# Patient Record
Sex: FEMALE | Race: WHITE | Employment: PART TIME | URBAN - METROPOLITAN AREA
[De-identification: names, ages, dates, MRNs, and addresses within clinical notes are randomized per-mention and may not be internally consistent; named-entity substitution may affect disease eponyms.]

---

## 2018-10-31 ENCOUNTER — OFFICE VISIT (OUTPATIENT)
Dept: NEUROLOGY | Age: 23
End: 2018-10-31
Payer: COMMERCIAL

## 2018-10-31 VITALS
HEIGHT: 68 IN | RESPIRATION RATE: 14 BRPM | OXYGEN SATURATION: 99 % | WEIGHT: 182 LBS | HEART RATE: 62 BPM | BODY MASS INDEX: 27.58 KG/M2 | DIASTOLIC BLOOD PRESSURE: 90 MMHG | SYSTOLIC BLOOD PRESSURE: 130 MMHG | TEMPERATURE: 98.1 F

## 2018-10-31 PROBLEM — G43.909 MIGRAINE HEADACHE: Chronic | Status: ACTIVE | Noted: 2018-10-31

## 2018-10-31 PROCEDURE — 99204 OFFICE O/P NEW MOD 45 MIN: CPT | Performed by: PSYCHIATRY & NEUROLOGY

## 2018-10-31 RX ORDER — AZELASTINE HYDROCHLORIDE 137 UG/1
SPRAY, METERED NASAL
Refills: 2 | COMMUNITY
Start: 2018-10-22 | End: 2021-04-02

## 2018-10-31 RX ORDER — NORETHINDRONE AND ETHINYL ESTRADIOL 0.8-25(24)
1 KIT ORAL NIGHTLY
Refills: 3 | COMMUNITY
Start: 2018-09-02

## 2018-10-31 RX ORDER — EPINEPHRINE 0.3 MG/.3ML
INJECTION SUBCUTANEOUS
Refills: 2 | COMMUNITY
Start: 2018-10-18

## 2018-10-31 RX ORDER — CLONAZEPAM 0.5 MG/1
0.5 TABLET ORAL PRN
Refills: 0 | COMMUNITY
Start: 2018-08-10 | End: 2021-04-02

## 2018-10-31 RX ORDER — SERTRALINE HYDROCHLORIDE 25 MG/1
TABLET, FILM COATED ORAL
Refills: 0 | COMMUNITY
Start: 2018-10-06 | End: 2021-04-02

## 2018-10-31 RX ORDER — FLUTICASONE PROPIONATE 50 MCG
SPRAY, SUSPENSION (ML) NASAL
Refills: 2 | COMMUNITY
Start: 2018-10-18 | End: 2021-03-23

## 2018-10-31 RX ORDER — RIZATRIPTAN BENZOATE 5 MG/1
TABLET, ORALLY DISINTEGRATING ORAL
Refills: 3 | COMMUNITY
Start: 2018-09-17 | End: 2021-04-02

## 2018-10-31 RX ORDER — LEVOCETIRIZINE DIHYDROCHLORIDE 5 MG/1
TABLET, FILM COATED ORAL
Refills: 2 | COMMUNITY
Start: 2018-10-18 | End: 2021-04-02

## 2018-10-31 RX ORDER — PROPRANOLOL HYDROCHLORIDE 20 MG/1
20 TABLET ORAL 3 TIMES DAILY
Qty: 90 TABLET | Refills: 5 | Status: SHIPPED | OUTPATIENT
Start: 2018-10-31 | End: 2021-04-02

## 2018-10-31 RX ORDER — AZELASTINE HYDROCHLORIDE 0.5 MG/ML
SOLUTION/ DROPS OPHTHALMIC
Refills: 0 | COMMUNITY
Start: 2018-10-18 | End: 2021-04-02

## 2018-10-31 RX ORDER — OMEGA-3-ACID ETHYL ESTERS 1 G/1
CAPSULE, LIQUID FILLED ORAL
Refills: 5 | COMMUNITY
Start: 2018-10-16 | End: 2021-04-02

## 2018-10-31 ASSESSMENT — ENCOUNTER SYMPTOMS
RESPIRATORY NEGATIVE: 1
ALLERGIC/IMMUNOLOGIC NEGATIVE: 1
EYES NEGATIVE: 1
GASTROINTESTINAL NEGATIVE: 1

## 2021-03-05 ENCOUNTER — HOSPITAL ENCOUNTER (EMERGENCY)
Age: 26
Discharge: HOME | End: 2021-03-06
Attending: EMERGENCY MEDICINE
Payer: COMMERCIAL

## 2021-03-05 VITALS
BODY MASS INDEX: 25.76 KG/M2 | RESPIRATION RATE: 18 BRPM | WEIGHT: 170 LBS | TEMPERATURE: 97.3 F | HEIGHT: 68 IN | HEART RATE: 85 BPM | DIASTOLIC BLOOD PRESSURE: 88 MMHG | OXYGEN SATURATION: 98 % | SYSTOLIC BLOOD PRESSURE: 144 MMHG

## 2021-03-05 DIAGNOSIS — S09.92XA NOSE INJURY, INITIAL ENCOUNTER: Primary | ICD-10-CM

## 2021-03-05 PROCEDURE — 99283 EMERGENCY DEPT VISIT LOW MDM: CPT

## 2021-03-07 NOTE — ED PROVIDER NOTES
HPI:  3/7/21,   Time: 2:46 PM GINA Vallecillo is a 32 y.o. female presenting to the ED for  Head injury. Patient states this evening she was playing with her dog one her dogs had actually hit patient's nose. Patient admits to some mild pain and swelling to right side of nose. Denies headache, no LOC, no neck pain, no weakness or paresthesias. No other complaints. ROS:   GEN: no fevers, no chills, no fatique  HENT: See HPI  EYES: No changes in vision, no pain  CARDIO: No chest pain, no palpitations  PULM: No trouble breathing, no wheezing  ABD: No pain, no nausea, no vomiting  MSK: No pain, no trauma, no deformities  SKIN: No rashes, no abrasions, no lacerations  NEURO: No changes in mentation, no headache, no weakness     --------------------------------------------- PAST HISTORY ---------------------------------------------  Past Medical History:  has a past medical history of Migraine headache. Past Surgical History:  has no past surgical history on file. Social History:  reports that she has never smoked. She has never used smokeless tobacco. She reports that she does not use drugs. Family History: family history is not on file. The patients home medications have been reviewed. Allergies: Patient has no known allergies. -------------------------------------------------- RESULTS -------------------------------------------------  All laboratory and radiology results have been personally reviewed by myself   LABS:  No results found for this visit on 03/05/21. RADIOLOGY:  Interpreted by Radiologist.  No orders to display       ------------------------- NURSING NOTES AND VITALS REVIEWED ---------------------------   The nursing notes within the ED encounter and vital signs as below have been reviewed.    BP (!) 144/88   Pulse 85   Temp 97.3 °F (36.3 °C)   Resp 18   Ht 5' 8\" (1.727 m)   Wt 170 lb (77.1 kg)   SpO2 98%   BMI 25.85 kg/m²   Oxygen Saturation Interpretation: Normal    ---------------------------------------------------PHYSICAL EXAM--------------------------------------    GEN: No acute distress, well-appearing, well nourished   HENT: Normocephalic, atraumatic, oral mucosa moist,  Mild tenderness to palpation over nasal bridge, no septal hematoma, mild edema noted right side of nose, no other facial tenderness. EYES: No scleral injection, no scleral icterus, PERRL, EOMI   PULM: Lungs clear to ascultation bilaterally, no wheezes, no crackles  CARDIO: Regular rate, regular rhythm, normal S1/S2  ABD: Soft, non-tender, no rigidity  MSK: No deformities, no edema, palpable pulses all extremities   SKIN: No rashes, no lacerations, no abrasions   NEURO:  AAOx3, Cranial nerves 2 through 12 grossly intact, muscle strength +5/5 equal bilateral upper and lower extremities, sensation intact in equal extremities, no pronator drift, no ataxia        ------------------------------ ED COURSE/MEDICAL DECISION MAKING----------------------  Medications - No data to display      ED Course and Medical Decision Making:    Patient presents for nose pain after mild trauma. Non-toxic and well appearing, no head trauma, no LOC, neurologically intact. Patient offered CTL phase to evaluate for possible fracture nasal septum, patient declined, states she will wait till swelling goes down and then if needed follow-up with ENT. No other indication for imaging at this time. Contact information for ENT was provided to patient. Discharged home with appropriate recommendations and return precautions. Patient states understanding agrees with plan.     --------------------------------- ADDITIONAL PROVIDER NOTES ---------------------------------    This patient's ED course included: a personal history and physicial eaxmination    This patient has remained hemodynamically stable during their ED course. Counseling:   The emergency provider has spoken with the patient and discussed todays results, in addition to providing specific details for the plan of care and counseling regarding the diagnosis and prognosis. Questions are answered at this time and they are agreeable with the plan.      --------------------------------- IMPRESSION AND DISPOSITION ---------------------------------    IMPRESSION  1.  Nose injury, initial encounter        DISPOSITION  Disposition: Discharge to home  Patient condition is good        Harjinder Calderon DO  03/07/21 1459

## 2021-03-23 ENCOUNTER — OFFICE VISIT (OUTPATIENT)
Dept: ENT CLINIC | Age: 26
End: 2021-03-23
Payer: COMMERCIAL

## 2021-03-23 VITALS — BODY MASS INDEX: 26.37 KG/M2 | HEIGHT: 68 IN | RESPIRATION RATE: 20 BRPM | WEIGHT: 174 LBS

## 2021-03-23 DIAGNOSIS — J34.3 HYPERTROPHY OF BOTH INFERIOR NASAL TURBINATES: ICD-10-CM

## 2021-03-23 DIAGNOSIS — J34.89 NASAL OBSTRUCTION: ICD-10-CM

## 2021-03-23 DIAGNOSIS — J34.2 DNS (DEVIATED NASAL SEPTUM): ICD-10-CM

## 2021-03-23 DIAGNOSIS — T78.40XS ALLERGY, SEQUELA: ICD-10-CM

## 2021-03-23 DIAGNOSIS — S02.2XXA CLOSED FRACTURE OF NASAL BONE, INITIAL ENCOUNTER: ICD-10-CM

## 2021-03-23 DIAGNOSIS — R09.81 NASAL CONGESTION: ICD-10-CM

## 2021-03-23 DIAGNOSIS — S09.93XA FACIAL INJURY, INITIAL ENCOUNTER: Primary | ICD-10-CM

## 2021-03-23 PROCEDURE — G8427 DOCREV CUR MEDS BY ELIG CLIN: HCPCS | Performed by: OTOLARYNGOLOGY

## 2021-03-23 PROCEDURE — 1036F TOBACCO NON-USER: CPT | Performed by: OTOLARYNGOLOGY

## 2021-03-23 PROCEDURE — 99204 OFFICE O/P NEW MOD 45 MIN: CPT | Performed by: OTOLARYNGOLOGY

## 2021-03-23 PROCEDURE — G8484 FLU IMMUNIZE NO ADMIN: HCPCS | Performed by: OTOLARYNGOLOGY

## 2021-03-23 PROCEDURE — G8419 CALC BMI OUT NRM PARAM NOF/U: HCPCS | Performed by: OTOLARYNGOLOGY

## 2021-03-23 RX ORDER — FLUTICASONE PROPIONATE 50 MCG
1 SPRAY, SUSPENSION (ML) NASAL 2 TIMES DAILY
Qty: 2 BOTTLE | Refills: 1 | Status: SHIPPED
Start: 2021-03-23 | End: 2021-04-02

## 2021-03-23 NOTE — PROGRESS NOTES
Dear Dr Espinoza primary care provider on file. No ref. provider found     We had the pleasure of seeing Ruth Ann, 1995 here    on 3/23/2021  Please see below for review of care and plans. Chief complaint- No diagnosis found. History of Present Illness- Was playing with dog and was hit in the face when she went to get the ball by dog's skull. Went to ER 20 days ago with no imaging and then here for eval Left side of nose feels blocked since event and also feels nose is off to the left side as the dog hit her right side. Has seasonal allergies that are worse in spring and uses meds but not fully controlled. + allergy testing with multiple antigens    Review of Systems- No drainage, discharge, or headache. Complete 10 system ROS completed and negative except as noted above. Physical Examination-   Vital Signs-Resp 20   Ht 5' 8\" (1.727 m)   Wt 174 lb (78.9 kg)   BMI 26.46 kg/m²     Ears- Tympanic membranes clear bilaterally. No middle ear effusion. No pre or post auricular tenderness. Nose- Nasal mucosa clear and dry. +  septal deviation and b inferior turbinate hypertrophy. + left deviation of external bony nasal vault with stepoff palpated. deviaiton of septum is related to bony vault deviation. Oral Cavity/Oropharynx- Floor of mouth and tongue are soft and nontender. No posterior pharyngeal erythema. + gag reflex  Neck- Soft and nontender. No masses, lesions, lymphadenopathy, or thyroid nodules appreciated. Cranial Nerve- Cranial nerves II to XII intact. Extraocular muscles intact. No gross motor visual deficits. No spontaneous nystagmus. Face- No facial skin tenderness to palpation. Heart- No cyanosis, regular  Lungs- No stridor, no intercostal accessory muscle use  General- The patient is in no acute distress. A&O x3    Medical Decision Making and Treatment Plan. Plan at this time for fracture management of nasal fracture was to   1 Review scans with pt- none  2.  D/w pt

## 2021-03-23 NOTE — PATIENT INSTRUCTIONS
[facility] Sinus Rinse     Step 1.- Fill the bottle with distilled water    Step 2.- Mix the solution  Put saline/salt mixture into the bottle, tighten the top of the bottle and shake gently to dissolve mixture. Step 3.- Positioning  Standing in front of the sink bend forward and tilt head down (about 45 degrees) so that you are looking down into the sink. Use this position unless instructed otherwise. Gently place the spout of the saline bottle against one nostril    Step 4.- Rinse  Breathe through your mouth and gently squeeze the bottle. The solution will start to drain from OPPOSITE nasal passage (or some, it may also drain into the mouth)  Use 2oz/60ml/half the solution in the bottle on each side  You may need to blow your nose afterwards to help drain any residual solution. Step 5. Repeat  Repeat step 3 and four with the other nostril! Videos demonstrating saline irrigation techniques    Use twice daily   SURGERY DATE:___________________________    Nothing to eat or drink after midnight the night before surgery unless otherwise instructed by the hospital.    DO NOT TAKE ANY ASPIRIN PRODUCTS 7 days prior to surgery. Tylenol only. No Advil, Motrin, Aleve, or Ibuprofen. Any illegal drugs in your system (including Marijuana even if legally prescribed) will result in your surgery being cancelled. Please be sure to check with our office or the hospital on time frame for the drugs to be out of your system. SHOULD YOUR INSURANCE CHANGE AT ANY TIME YOU MUST CONTACT OUR OFFICE. FAILURE TO DO SO MAY RESULT IN YOUR SURGERY BEING RESCHEDULED OR YOU MAY BE CHARGED AS SELF-PAY. If you need FMLA or Short Term Disability paperwork completed for your surgery, please complete your portion, ensure your name and date of birth are on them and fax them to [phone] asap. Paperwork can take up to 2 weeks to be completed.     Per current hospital protocols, you will be contacted within 1 week of your surgery date with instructions to complete COVID-19 testing. Surgery will be cancelled if this is not completed. If you need medical clearance, you are responsible to contact your physician(s) to schedule the appointment. If clearance is not completed within 30 days of your surgery it may be cancelled. Our office will fax the appropriate forms that need to be completed to your physician(s). The location of your surgery will call you the day prior to your surgery date to let you know what time you have to be there and any other details. ·       [address] [facility], [address] [facility] will call you a couple days prior to surgery and give you further instructions, if you have any questions, you can reach them at [phone]    ·       [address] [facility] will call you a couple days prior to surgery and give you further instructions, if you have any questions, you can reach them at [phone]    ·       [facility], [address] [facility] will call you a couple days prior to surgery and give you further instructions, if you have any questions, you can reach them at [phone] extension [phone]    ·      [address] [place] [address].  Duran Carroll will call you a couple days prior to surgery and give you further instructions, if you have any questions, you can reach them at [phone] [address] [facility] OFFICE IF THERE SHOULD BE ANY CHANGES TO MY INFORMATION    Signature: __________________________________ Date: ____/____/____        Ketotifen eye drops       Can be purchased over the counter

## 2021-03-30 NOTE — PROGRESS NOTES
Patient agreed to COVID test on 4/2 at the  [facility]   located at  [address] [place] [address]. between the hours of 6 am- 2:30 pm, instructed to bring ID. Patient instructed to self isolate until day of surgery.

## 2021-04-01 ENCOUNTER — HOSPITAL ENCOUNTER (OUTPATIENT)
Age: 26
Discharge: HOME | End: 2021-04-03
Payer: COMMERCIAL

## 2021-04-01 DIAGNOSIS — U07.1 COVID-19: ICD-10-CM

## 2021-04-01 PROCEDURE — U0003 INFECTIOUS AGENT DETECTION BY NUCLEIC ACID (DNA OR RNA); SEVERE ACUTE RESPIRATORY SYNDROME CORONAVIRUS 2 (SARS-COV-2) (CORONAVIRUS DISEASE [COVID-19]), AMPLIFIED PROBE TECHNIQUE, MAKING USE OF HIGH THROUGHPUT TECHNOLOGIES AS DESCRIBED BY CMS-2020-01-R: HCPCS

## 2021-04-02 NOTE — PROGRESS NOTES
[address] PRE-ADMISSION TESTING GENERAL INSTRUCTIONS- [facility]-phone number:[phone]    GENERAL INSTRUCTIONS  [x] Antibacterial Soap shower Night before and/or AM of Surgery    [x] Nothing by mouth after midnight, including gum, candy, mints, or water. [x] You may brush your teeth, gargle, but do NOT swallow water. [x]No smoking, chewing tobacco, illegal drugs, or alcohol within 24 hours of your surgery. [x] Jewelry, valuables or body piercing's should not be brought to the hospital. All body and/or tongue piercing's must be removed prior to arriving to hospital.  ALL hair pins must be removed. [x] Do not wear makeup, lotions, powders, deodorant. Nail polish as directed by the nurse. [x] Arrange transportation with a responsible adult  to and from the hospital. If you do not have a responsible adult  to transport you, you will need to make arrangements with a medical transportation company (i.e. [employer]. A Uber/taxi/bus is not appropriate unless you are accompanied by a responsible adult ). Arrange for someone to be with you for the remainder of the day and for 24 hours after your procedure due to having had anesthesia. Who will be your  for transportation? Nahumelli Long, mom  Who will be staying with you for 24 hrs after your procedure? Nahum Long, mom and grandparentls  [x] Bring insurance card and photo ID. [x] Bring urine specimen day of surgery. Any small container is acceptable. PARKING INSTRUCTIONS:   [x] Arrival Time: 9:30 am, you and your  will need to wear a mask. · [x] Parking lot '\"I\"  is located on [facility] (the corner of [facility]). To enter, press the button and the gate will lift. A free token will be provided to exit the lot. One car per patient is allowed to park in this lot. All other cars are to park on [address] [place] [address] either in the parking garage or the handicap lot.       Walk up the

## 2021-04-03 LAB
SARS-COV-2: NOT DETECTED
SOURCE: NORMAL

## 2021-04-06 NOTE — H&P
We had the pleasure of seeing Ruth Ann, 1995 here    on 3/23/2021  Please see below for review of care and plans.      Chief complaint- No diagnosis found.        History of Present Illness- Was playing with dog and was hit in the face when she went to get the ball by dog's skull. Went to ER 20 days ago with no imaging and then here for eval Left side of nose feels blocked since event and also feels nose is off to the left side as the dog hit her right side. Has seasonal allergies that are worse in spring and uses meds but not fully controlled. + allergy testing with multiple antigens    Review of Systems- No drainage, discharge, or headache. Complete 10 system ROS completed and negative except as noted above. Physical Examination-   Vital Signs-Resp 20   Ht 5' 8\" (1.727 m)   Wt 174 lb (78.9 kg)   BMI 26.46 kg/m²     Ears- Tympanic membranes clear bilaterally.  No middle ear effusion.  No pre or post auricular tenderness. Nose- Nasal mucosa clear and dry.  +  septal deviation and b inferior turbinate hypertrophy. + left deviation of external bony nasal vault with stepoff palpated. deviaiton of septum is related to bony vault deviation. Oral Cavity/Oropharynx- Floor of mouth and tongue are soft and nontender.  No posterior pharyngeal erythema. + gag reflex  Neck- Soft and nontender.  No masses, lesions, lymphadenopathy, or thyroid nodules appreciated. Cranial Nerve- Cranial nerves II to XII intact.  Extraocular muscles intact.  No gross motor visual deficits.  No spontaneous nystagmus.    Face- No facial skin tenderness to palpation. Heart- No cyanosis, regular  Lungs- No stridor, no intercostal accessory muscle use  General- The patient is in no acute distress. A&O x3    Medical Decision Making and Treatment Plan. Plan at this time for fracture management of nasal fracture was to   1 Review scans with pt- none  2. D/w pt options of treatment- conservative vs surgical   3.  Recommend surgical intervention as + displaced fracture of nasal bone to the left with concomitant deviation of the septum   4. R/B/A of surgery discussed with pt. Pt understood, consent signed, and would like to proceed to surgery. No personal or family history of bleeding or adverse reaction to anesthesia. 5. flonase for allergies  6. Nasal irrigation  7. Use ketotifen eye drops for break throughallergy eye sxs.       Thank you for the opportunity to take part in the care of this very pleasant patient, Edward Farr  Sincerely,             Evgeny Barbosa. Mari Palacios M.D., Ph.D., [place] [address]  Department of Otolaryngology-Head and Neck Surgery                  Office Visit on 3/23/2021        Revision History        Detailed Report        Note shared with patient  Progress Notes Info    Author Note Status Last Update User   Lincoln Oleary MD Signed Lincoln Oleary MD   Last Update Date/Time: 3/23/2021  5:58 PM   Chart Review Routing History    No routing history on file. H&P reviewed, no changes. No issues. Questions and concerns were answered to the patient's satisfaction.  Will proceed with procedure    Will discuss with attending     Electronically signed by Keara Hartman DO on 4/6/21 at 6:06 PM EDT

## 2021-04-07 ENCOUNTER — HOSPITAL ENCOUNTER (OUTPATIENT)
Age: 26
Setting detail: OUTPATIENT SURGERY
Discharge: HOME | End: 2021-04-07
Attending: OTOLARYNGOLOGY | Admitting: OTOLARYNGOLOGY
Payer: COMMERCIAL

## 2021-04-07 ENCOUNTER — ANESTHESIA (OUTPATIENT)
Dept: OPERATING ROOM | Age: 26
End: 2021-04-07
Payer: COMMERCIAL

## 2021-04-07 ENCOUNTER — ANESTHESIA EVENT (OUTPATIENT)
Dept: OPERATING ROOM | Age: 26
End: 2021-04-07
Payer: COMMERCIAL

## 2021-04-07 VITALS
WEIGHT: 174 LBS | OXYGEN SATURATION: 96 % | DIASTOLIC BLOOD PRESSURE: 68 MMHG | HEIGHT: 68 IN | TEMPERATURE: 98.1 F | BODY MASS INDEX: 26.37 KG/M2 | RESPIRATION RATE: 17 BRPM | HEART RATE: 83 BPM | SYSTOLIC BLOOD PRESSURE: 137 MMHG

## 2021-04-07 VITALS — SYSTOLIC BLOOD PRESSURE: 132 MMHG | OXYGEN SATURATION: 96 % | TEMPERATURE: 97.2 F | DIASTOLIC BLOOD PRESSURE: 86 MMHG

## 2021-04-07 DIAGNOSIS — U07.1 COVID-19: Primary | ICD-10-CM

## 2021-04-07 DIAGNOSIS — Z01.818 PRE-OP TESTING: ICD-10-CM

## 2021-04-07 DIAGNOSIS — G89.18 POST-OP PAIN: ICD-10-CM

## 2021-04-07 LAB
HCG, URINE, POC: NEGATIVE
Lab: NORMAL
NEGATIVE QC PASS/FAIL: NORMAL
POSITIVE QC PASS/FAIL: NORMAL

## 2021-04-07 PROCEDURE — 6370000000 HC RX 637 (ALT 250 FOR IP): Performed by: OTOLARYNGOLOGY

## 2021-04-07 PROCEDURE — 2720000010 HC SURG SUPPLY STERILE: Performed by: OTOLARYNGOLOGY

## 2021-04-07 PROCEDURE — 2500000003 HC RX 250 WO HCPCS

## 2021-04-07 PROCEDURE — 21315 CLSD TX NSL FX MNPJ WO STBLJ: CPT | Performed by: OTOLARYNGOLOGY

## 2021-04-07 PROCEDURE — 3600000003 HC SURGERY LEVEL 3 BASE: Performed by: OTOLARYNGOLOGY

## 2021-04-07 PROCEDURE — 6360000002 HC RX W HCPCS: Performed by: ANESTHESIOLOGY

## 2021-04-07 PROCEDURE — 2709999900 HC NON-CHARGEABLE SUPPLY: Performed by: OTOLARYNGOLOGY

## 2021-04-07 PROCEDURE — 6370000000 HC RX 637 (ALT 250 FOR IP): Performed by: STUDENT IN AN ORGANIZED HEALTH CARE EDUCATION/TRAINING PROGRAM

## 2021-04-07 PROCEDURE — 7100000000 HC PACU RECOVERY - FIRST 15 MIN: Performed by: OTOLARYNGOLOGY

## 2021-04-07 PROCEDURE — 7100000011 HC PHASE II RECOVERY - ADDTL 15 MIN: Performed by: OTOLARYNGOLOGY

## 2021-04-07 PROCEDURE — 7100000001 HC PACU RECOVERY - ADDTL 15 MIN: Performed by: OTOLARYNGOLOGY

## 2021-04-07 PROCEDURE — 3600000013 HC SURGERY LEVEL 3 ADDTL 15MIN: Performed by: OTOLARYNGOLOGY

## 2021-04-07 PROCEDURE — 2580000003 HC RX 258

## 2021-04-07 PROCEDURE — 3700000000 HC ANESTHESIA ATTENDED CARE: Performed by: OTOLARYNGOLOGY

## 2021-04-07 PROCEDURE — 6360000002 HC RX W HCPCS

## 2021-04-07 PROCEDURE — 2500000003 HC RX 250 WO HCPCS: Performed by: OTOLARYNGOLOGY

## 2021-04-07 PROCEDURE — 7100000010 HC PHASE II RECOVERY - FIRST 15 MIN: Performed by: OTOLARYNGOLOGY

## 2021-04-07 PROCEDURE — 30802 ABLATE INF TURBINATE SUBMUC: CPT | Performed by: OTOLARYNGOLOGY

## 2021-04-07 PROCEDURE — 3700000001 HC ADD 15 MINUTES (ANESTHESIA): Performed by: OTOLARYNGOLOGY

## 2021-04-07 PROCEDURE — 21337 CLOSED TX SEPTAL&NOSE FX: CPT | Performed by: OTOLARYNGOLOGY

## 2021-04-07 RX ORDER — MEPERIDINE HYDROCHLORIDE 25 MG/ML
12.5 INJECTION INTRAMUSCULAR; INTRAVENOUS; SUBCUTANEOUS EVERY 5 MIN PRN
Status: DISCONTINUED | OUTPATIENT
Start: 2021-04-07 | End: 2021-04-07 | Stop reason: HOSPADM

## 2021-04-07 RX ORDER — LIDOCAINE HYDROCHLORIDE AND EPINEPHRINE 10; 10 MG/ML; UG/ML
INJECTION, SOLUTION INFILTRATION; PERINEURAL PRN
Status: DISCONTINUED | OUTPATIENT
Start: 2021-04-07 | End: 2021-04-07 | Stop reason: ALTCHOICE

## 2021-04-07 RX ORDER — LABETALOL HYDROCHLORIDE 5 MG/ML
5 INJECTION, SOLUTION INTRAVENOUS EVERY 10 MIN PRN
Status: DISCONTINUED | OUTPATIENT
Start: 2021-04-07 | End: 2021-04-07 | Stop reason: HOSPADM

## 2021-04-07 RX ORDER — SODIUM CHLORIDE 0.9 % (FLUSH) 0.9 %
10 SYRINGE (ML) INJECTION PRN
Status: DISCONTINUED | OUTPATIENT
Start: 2021-04-07 | End: 2021-04-07 | Stop reason: HOSPADM

## 2021-04-07 RX ORDER — DEXAMETHASONE SODIUM PHOSPHATE 10 MG/ML
INJECTION, SOLUTION INTRAMUSCULAR; INTRAVENOUS PRN
Status: DISCONTINUED | OUTPATIENT
Start: 2021-04-07 | End: 2021-04-07 | Stop reason: SDUPTHER

## 2021-04-07 RX ORDER — PROPOFOL 10 MG/ML
INJECTION, EMULSION INTRAVENOUS PRN
Status: DISCONTINUED | OUTPATIENT
Start: 2021-04-07 | End: 2021-04-07 | Stop reason: SDUPTHER

## 2021-04-07 RX ORDER — FENTANYL CITRATE 50 UG/ML
INJECTION, SOLUTION INTRAMUSCULAR; INTRAVENOUS PRN
Status: DISCONTINUED | OUTPATIENT
Start: 2021-04-07 | End: 2021-04-07 | Stop reason: SDUPTHER

## 2021-04-07 RX ORDER — PROMETHAZINE HYDROCHLORIDE 25 MG/ML
6.25 INJECTION, SOLUTION INTRAMUSCULAR; INTRAVENOUS
Status: DISCONTINUED | OUTPATIENT
Start: 2021-04-07 | End: 2021-04-07 | Stop reason: HOSPADM

## 2021-04-07 RX ORDER — MIDAZOLAM HYDROCHLORIDE 1 MG/ML
INJECTION INTRAMUSCULAR; INTRAVENOUS PRN
Status: DISCONTINUED | OUTPATIENT
Start: 2021-04-07 | End: 2021-04-07 | Stop reason: SDUPTHER

## 2021-04-07 RX ORDER — HYDROCODONE BITARTRATE AND ACETAMINOPHEN 5; 325 MG/1; MG/1
1 TABLET ORAL
Status: COMPLETED | OUTPATIENT
Start: 2021-04-07 | End: 2021-04-07

## 2021-04-07 RX ORDER — ONDANSETRON 2 MG/ML
INJECTION INTRAMUSCULAR; INTRAVENOUS PRN
Status: DISCONTINUED | OUTPATIENT
Start: 2021-04-07 | End: 2021-04-07 | Stop reason: SDUPTHER

## 2021-04-07 RX ORDER — GLYCOPYRROLATE 1 MG/5 ML
SYRINGE (ML) INTRAVENOUS PRN
Status: DISCONTINUED | OUTPATIENT
Start: 2021-04-07 | End: 2021-04-07 | Stop reason: SDUPTHER

## 2021-04-07 RX ORDER — ROCURONIUM BROMIDE 10 MG/ML
INJECTION, SOLUTION INTRAVENOUS PRN
Status: DISCONTINUED | OUTPATIENT
Start: 2021-04-07 | End: 2021-04-07 | Stop reason: SDUPTHER

## 2021-04-07 RX ORDER — OXYMETAZOLINE HYDROCHLORIDE 0.05 G/100ML
2 SPRAY NASAL
Status: COMPLETED | OUTPATIENT
Start: 2021-04-07 | End: 2021-04-07

## 2021-04-07 RX ORDER — KETOROLAC TROMETHAMINE 30 MG/ML
INJECTION, SOLUTION INTRAMUSCULAR; INTRAVENOUS PRN
Status: DISCONTINUED | OUTPATIENT
Start: 2021-04-07 | End: 2021-04-07 | Stop reason: SDUPTHER

## 2021-04-07 RX ORDER — NEOSTIGMINE METHYLSULFATE 1 MG/ML
INJECTION, SOLUTION INTRAVENOUS PRN
Status: DISCONTINUED | OUTPATIENT
Start: 2021-04-07 | End: 2021-04-07 | Stop reason: SDUPTHER

## 2021-04-07 RX ORDER — SODIUM CHLORIDE 9 MG/ML
INJECTION, SOLUTION INTRAVENOUS CONTINUOUS PRN
Status: DISCONTINUED | OUTPATIENT
Start: 2021-04-07 | End: 2021-04-07 | Stop reason: SDUPTHER

## 2021-04-07 RX ORDER — HYDROCODONE BITARTRATE AND ACETAMINOPHEN 5; 325 MG/1; MG/1
1 TABLET ORAL EVERY 4 HOURS PRN
Qty: 12 TABLET | Refills: 0 | Status: SHIPPED | OUTPATIENT
Start: 2021-04-07 | End: 2021-04-14

## 2021-04-07 RX ORDER — HYDRALAZINE HYDROCHLORIDE 20 MG/ML
5 INJECTION INTRAMUSCULAR; INTRAVENOUS EVERY 10 MIN PRN
Status: DISCONTINUED | OUTPATIENT
Start: 2021-04-07 | End: 2021-04-07 | Stop reason: HOSPADM

## 2021-04-07 RX ORDER — SODIUM CHLORIDE 0.9 % (FLUSH) 0.9 %
10 SYRINGE (ML) INJECTION EVERY 12 HOURS SCHEDULED
Status: DISCONTINUED | OUTPATIENT
Start: 2021-04-07 | End: 2021-04-07 | Stop reason: HOSPADM

## 2021-04-07 RX ORDER — LIDOCAINE HYDROCHLORIDE 20 MG/ML
INJECTION, SOLUTION INTRAVENOUS PRN
Status: DISCONTINUED | OUTPATIENT
Start: 2021-04-07 | End: 2021-04-07 | Stop reason: SDUPTHER

## 2021-04-07 RX ADMIN — LIDOCAINE HYDROCHLORIDE 100 MG: 20 INJECTION, SOLUTION INTRAVENOUS at 11:43

## 2021-04-07 RX ADMIN — MIDAZOLAM 2 MG: 1 INJECTION INTRAMUSCULAR; INTRAVENOUS at 11:39

## 2021-04-07 RX ADMIN — SODIUM CHLORIDE: 9 INJECTION, SOLUTION INTRAVENOUS at 12:33

## 2021-04-07 RX ADMIN — Medication 2 SPRAY: at 10:05

## 2021-04-07 RX ADMIN — SODIUM CHLORIDE: 9 INJECTION, SOLUTION INTRAVENOUS at 11:39

## 2021-04-07 RX ADMIN — DEXAMETHASONE SODIUM PHOSPHATE 10 MG: 10 INJECTION, SOLUTION INTRAMUSCULAR; INTRAVENOUS at 11:43

## 2021-04-07 RX ADMIN — Medication 0.3 MG: at 12:42

## 2021-04-07 RX ADMIN — PROPOFOL 200 MG: 10 INJECTION, EMULSION INTRAVENOUS at 11:43

## 2021-04-07 RX ADMIN — FENTANYL CITRATE 100 MCG: 50 INJECTION, SOLUTION INTRAMUSCULAR; INTRAVENOUS at 11:43

## 2021-04-07 RX ADMIN — HYDROMORPHONE HYDROCHLORIDE 0.25 MG: 1 INJECTION, SOLUTION INTRAMUSCULAR; INTRAVENOUS; SUBCUTANEOUS at 13:25

## 2021-04-07 RX ADMIN — ROCURONIUM BROMIDE 30 MG: 10 INJECTION, SOLUTION INTRAVENOUS at 11:43

## 2021-04-07 RX ADMIN — KETOROLAC TROMETHAMINE 30 MG: 30 INJECTION, SOLUTION INTRAMUSCULAR; INTRAVENOUS at 12:16

## 2021-04-07 RX ADMIN — HYDROCODONE BITARTRATE AND ACETAMINOPHEN 1 TABLET: 5; 325 TABLET ORAL at 14:25

## 2021-04-07 RX ADMIN — ONDANSETRON HYDROCHLORIDE 4 MG: 2 INJECTION, SOLUTION INTRAMUSCULAR; INTRAVENOUS at 12:16

## 2021-04-07 RX ADMIN — HYDROMORPHONE HYDROCHLORIDE 0.25 MG: 1 INJECTION, SOLUTION INTRAMUSCULAR; INTRAVENOUS; SUBCUTANEOUS at 13:20

## 2021-04-07 RX ADMIN — Medication 1.5 MG: at 12:42

## 2021-04-07 RX ADMIN — FENTANYL CITRATE 50 MCG: 50 INJECTION, SOLUTION INTRAMUSCULAR; INTRAVENOUS at 12:24

## 2021-04-07 ASSESSMENT — PULMONARY FUNCTION TESTS
PIF_VALUE: 2
PIF_VALUE: 5
PIF_VALUE: 18
PIF_VALUE: 19
PIF_VALUE: 18
PIF_VALUE: 19
PIF_VALUE: 19
PIF_VALUE: 18
PIF_VALUE: 19
PIF_VALUE: 18
PIF_VALUE: 19
PIF_VALUE: 20
PIF_VALUE: 18
PIF_VALUE: 18
PIF_VALUE: 19
PIF_VALUE: 18
PIF_VALUE: 3
PIF_VALUE: 23
PIF_VALUE: 18
PIF_VALUE: 19
PIF_VALUE: 18
PIF_VALUE: 19
PIF_VALUE: 18
PIF_VALUE: 19
PIF_VALUE: 22

## 2021-04-07 ASSESSMENT — PAIN DESCRIPTION - LOCATION
LOCATION: NOSE
LOCATION: HEAD;NOSE
LOCATION: HEAD;NOSE

## 2021-04-07 ASSESSMENT — PAIN DESCRIPTION - PAIN TYPE
TYPE: ACUTE PAIN;SURGICAL PAIN
TYPE: SURGICAL PAIN
TYPE: SURGICAL PAIN

## 2021-04-07 ASSESSMENT — PAIN SCALES - GENERAL
PAINLEVEL_OUTOF10: 0
PAINLEVEL_OUTOF10: 2

## 2021-04-07 ASSESSMENT — PAIN DESCRIPTION - FREQUENCY
FREQUENCY: INTERMITTENT

## 2021-04-07 ASSESSMENT — PAIN DESCRIPTION - DESCRIPTORS
DESCRIPTORS: ACHING;DISCOMFORT
DESCRIPTORS: ACHING;BURNING;CONSTANT

## 2021-04-07 NOTE — ANESTHESIA POSTPROCEDURE EVALUATION
Department of Anesthesiology  Postprocedure Note    Patient: Harrison Edwards  MRN: [id number]  YOB: 1995  Date of evaluation: 4/7/2021  Time:  2:35 PM     Procedure Summary     Date: 04/07/21 Room / Location: [place] [address] / [facility]    Anesthesia Start: 5948 Anesthesia Stop: 1624    Procedure: CLOSED REDUCTION NASAL BONE FRACTURE, SUBMUCOUSAL RESECTION OF INFERIOR TURBINATES (N/A Nose) Diagnosis: (NASAL BONE FRACTURE, NASAL CONGESTION, INFERIOR TURBINATE HYPERTROPHY)    Surgeons: Windy Lee MD Responsible Provider: Nancy Edmond MD    Anesthesia Type: general ASA Status: 2          Anesthesia Type: general    Ashley Phase I: Ashley Score: 10    Ashley Phase II: Ashley Score: 10    Last vitals: Reviewed and per EMR flowsheets.        Anesthesia Post Evaluation    Patient location during evaluation: PACU  Patient participation: complete - patient participated  Level of consciousness: awake and alert  Airway patency: patent  Nausea & Vomiting: no nausea and no vomiting  Complications: no  Cardiovascular status: hemodynamically stable and blood pressure returned to baseline  Respiratory status: acceptable  Hydration status: euvolemic

## 2021-04-07 NOTE — ANESTHESIA PRE PROCEDURE
Department of Anesthesiology  Preprocedure Note       Name:  Praful Peña   Age:  32 y.o.  :  1995                                          MRN:  [id number]         Date:  2021      Surgeon: Abdoulaye Alfaro):  Laureen Quintanilla MD    Procedure: Procedure(s):  CLOSED REDUCTION NASAL BONE FRACTURE, SUBMUCOUSAL RESECTION OF INFERIOR TURBINATES    Medications prior to admission:   Prior to Admission medications    Medication Sig Start Date End Date Taking? Authorizing Provider   Fexofenadine HCl (ALLEGRA PO) Take 1 tablet by mouth daily   Yes Historical Provider, MD Lynnette Velazquez FE 0.8-25 MG-MCG CHEW every morning  18  Yes Historical Provider, MD   EPINEPHrine (EPIPEN) 0.3 MG/0.3ML SOAJ injection USE 1 PEN AS NEEDED. MAY REPEAT EVERY 15-20 MINUTES 10/18/18   Historical Provider, MD       Current medications:    Current Facility-Administered Medications   Medication Dose Route Frequency Provider Last Rate Last Admin    sodium chloride flush 0.9 % injection 10 mL  10 mL Intravenous 2 times per day Juana Horowitz DO        sodium chloride flush 0.9 % injection 10 mL  10 mL Intravenous PRN Juana Horowitz DO           Allergies:     Allergies   Allergen Reactions    Seasonal        Problem List:    Patient Active Problem List   Diagnosis Code    Migraine headache G43.909       Past Medical History:        Diagnosis Date    Migraine headache 10/31/2018       Past Surgical History:        Procedure Laterality Date    SINUS SURGERY      Polyps removed not certain site ie right, left or bilateral    WISDOM TOOTH EXTRACTION         Social History:    Social History     Tobacco Use    Smoking status: Never Smoker    Smokeless tobacco: Never Used   Substance Use Topics    Alcohol use: Yes     Comment: couple times a month                                Counseling given: Not Answered      Vital Signs (Current):   Vitals:    21 1521 21 0953   Weight: 174 lb (78.9 kg) 174 lb (78.9 kg)   Height: 5' 8\" (1.727 m) 5' 8\" (1.727 m)                                              BP Readings from Last 3 Encounters:   03/05/21 (!) 144/88   10/31/18 (!) 130/90       NPO Status: Time of last liquid consumption: 2000                        Time of last solid consumption: 2000                        Date of last liquid consumption: 04/06/21                        Date of last solid food consumption: 04/06/21    BMI:   Wt Readings from Last 3 Encounters:   04/07/21 174 lb (78.9 kg)   03/23/21 174 lb (78.9 kg)   03/05/21 170 lb (77.1 kg)     Body mass index is 26.46 kg/m². CBC: No results found for: WBC, RBC, HGB, HCT, MCV, RDW, PLT    CMP: No results found for: NA, K, CL, CO2, BUN, CREATININE, GFRAA, AGRATIO, LABGLOM, GLUCOSE, PROT, CALCIUM, BILITOT, ALKPHOS, AST, ALT    POC Tests: No results for input(s): POCGLU, POCNA, POCK, POCCL, POCBUN, POCHEMO, POCHCT in the last 72 hours.     Coags: No results found for: PROTIME, INR, APTT    HCG (If Applicable): No results found for: PREGTESTUR, PREGSERUM, HCG, HCGQUANT     ABGs: No results found for: PHART, PO2ART, AMU5UGX, FMJ3DMN, BEART, J4ELEEBI     Type & Screen (If Applicable):  No results found for: LABABO, LABRH    Drug/Infectious Status (If Applicable):  No results found for: HIV, HEPCAB    COVID-19 Screening (If Applicable):   Lab Results   Component Value Date    COVID19 Not Detected 04/01/2021           Anesthesia Evaluation  Patient summary reviewed and Nursing notes reviewed no history of anesthetic complications:   Airway: Mallampati: II  TM distance: >3 FB   Neck ROM: full  Mouth opening: > = 3 FB Dental:          Pulmonary:Negative Pulmonary ROS breath sounds clear to auscultation                             Cardiovascular:Negative CV ROS  Exercise tolerance: good (>4 METS),           Rhythm: regular  Rate: normal           Beta Blocker:  Dose within 24 Hrs         Neuro/Psych:   (+) headaches: migraine headaches,             GI/Hepatic/Renal: Neg GI/Hepatic/Renal ROS Endo/Other: Negative Endo/Other ROS                     ROS comment: WISDOM TOOTH EXTRACTION Abdominal:           Vascular: negative vascular ROS. Anesthesia Plan      general     ASA 2       Induction: intravenous. MIPS: Postoperative opioids intended and Prophylactic antiemetics administered. Anesthetic plan and risks discussed with patient. Use of blood products discussed with patient whom consented to blood products. Plan discussed with CRNA. Macie Boyd RN   4/7/2021        DOS STAFF ADDENDUM:    Patient seen and chart reviewed. Physical exam and history updated as indicated. NPO status confirmed. Anesthesia options and plan discussed including risks benefits with patient/legal guardian and family as available. Concerns and questions addressed. Consent verbalized to proceed.   Anesthesia plan, options and intraoperative/postoperative concerns discussed with care team.    Teodoro Freeman MD  4/7/2021  12:04 PM

## 2021-04-07 NOTE — H&P
H&P reviewed, no changes. No issues. Questions and concerns were answered to the patient's satisfaction.  Will proceed with procedure    Will discuss with attending     Electronically signed by Sophie Ryder DO on 4/7/21 at 11:11 AM EDT

## 2021-04-07 NOTE — OP NOTE
Operative Note      Patient: Edward Moore  YOB: 1995  MRN: [id number]    Date of Procedure: 4/7/2021    Pre-Op Diagnosis: NASAL BONE FRACTURE, NASAL CONGESTION, INFERIOR TURBINATE HYPERTROPHY, dns, septal fracture    Post-Op Diagnosis: Same       Procedure(s):  CLOSED REDUCTION NASAL BONE FRACTURE, SUBMUCOUSAL RESECTION OF INFERIOR TURBINATES, reductin of septal frature and dns    Surgeon(s):  Malena Walker MD    Assistant:   Resident: Feli Lopez DO    Anesthesia: General    Estimated Blood Loss (mL): Minimal    Complications: None    Specimens:   * No specimens in log *    Implants:  * No implants in log *      Drains: * No LDAs found *    Findings: Nasal dorsum deviation to the left, nasal septal deviatin to the left as it was accordioned and telescoped due to the nasal fracture and facial trauma    Detailed Description of Procedure:    INDICATIONS FOR PROCEDURE: Traumatic nasal bone fracture, inferior turbinate hypertrophy , dns and fracture  DESCRIPTION OF PROCEDURE: The patient was taken to Operating Room identified as Elaine Tellez and the procedure verified as facial fracture repair. The patient was intubated uneventfully and Lidocaine 1% with epinephrine was used to infiltrate the nasal sidewalls. Attention then to the nasal bone. Appeared to have shift of nasal vault to the left with some collapse of the right nasal bone along with a left side telescoped and accordioned septal fracture. . butter knife was used to reduce vault and move the nasal bone, additional manipulatoin  Of the septal fracture allowed the septum to better approximate into the midline with significantly less telescoping and accordioning. The left bone did not want to stay in ideal position so a small pack of surgicell was placed for stabilizatioun of the nasal fracture. Area irrigated out copiously. The inferior turbinates were examined and had bilateral hypertrophy.  This was done with a needle point monopolar in three separate stab incisions. The inferior turbinates were outfractured as well. External nasal splint done with placement of a thermoplastc splint over steris strips. Hemostasis was excellent throughout the case. transferred to anesthesia and back to pacu.        Electronically signed by Omar Hanna DO on 4/7/2021 at 2:33 PM

## 2021-04-09 ENCOUNTER — APPOINTMENT (OUTPATIENT)
Dept: CT IMAGING | Age: 26
DRG: 245 | End: 2021-04-09
Payer: COMMERCIAL

## 2021-04-09 ENCOUNTER — HOSPITAL ENCOUNTER (INPATIENT)
Age: 26
LOS: 2 days | Discharge: HOME | DRG: 245 | End: 2021-04-11
Attending: EMERGENCY MEDICINE | Admitting: INTERNAL MEDICINE
Payer: COMMERCIAL

## 2021-04-09 DIAGNOSIS — K92.2 GASTROINTESTINAL HEMORRHAGE, UNSPECIFIED GASTROINTESTINAL HEMORRHAGE TYPE: ICD-10-CM

## 2021-04-09 DIAGNOSIS — K51.00 PANCOLITIS (HCC): Primary | ICD-10-CM

## 2021-04-09 DIAGNOSIS — R11.2 NON-INTRACTABLE VOMITING WITH NAUSEA, UNSPECIFIED VOMITING TYPE: ICD-10-CM

## 2021-04-09 DIAGNOSIS — R10.30 LOWER ABDOMINAL PAIN: ICD-10-CM

## 2021-04-09 LAB
ALBUMIN SERPL-MCNC: 3.9 G/DL (ref 3.5–5.2)
ALP BLD-CCNC: 57 U/L (ref 35–104)
ALT SERPL-CCNC: 18 U/L (ref 0–32)
ANION GAP SERPL CALCULATED.3IONS-SCNC: 7 MMOL/L (ref 7–16)
AST SERPL-CCNC: 23 U/L (ref 0–31)
BASOPHILS ABSOLUTE: 0.1 E9/L (ref 0–0.2)
BASOPHILS RELATIVE PERCENT: 0.7 % (ref 0–2)
BILIRUB SERPL-MCNC: 0.6 MG/DL (ref 0–1.2)
BUN BLDV-MCNC: 9 MG/DL (ref 6–20)
C-REACTIVE PROTEIN: 2.1 MG/DL (ref 0–0.4)
CALCIUM SERPL-MCNC: 9.1 MG/DL (ref 8.6–10.2)
CHLORIDE BLD-SCNC: 105 MMOL/L (ref 98–107)
CO2: 26 MMOL/L (ref 22–29)
CREAT SERPL-MCNC: 1 MG/DL (ref 0.5–1)
EOSINOPHILS ABSOLUTE: 0.03 E9/L (ref 0.05–0.5)
EOSINOPHILS RELATIVE PERCENT: 0.2 % (ref 0–6)
GFR AFRICAN AMERICAN: >60
GFR NON-AFRICAN AMERICAN: >60 ML/MIN/1.73
GLUCOSE BLD-MCNC: 99 MG/DL (ref 74–99)
HCG, URINE, POC: NEGATIVE
HCT VFR BLD CALC: 37.3 % (ref 34–48)
HCT VFR BLD CALC: 39.6 % (ref 34–48)
HEMOGLOBIN: 11.9 G/DL (ref 11.5–15.5)
HEMOGLOBIN: 12.3 G/DL (ref 11.5–15.5)
IMMATURE GRANULOCYTES #: 0.05 E9/L
IMMATURE GRANULOCYTES %: 0.3 % (ref 0–5)
LYMPHOCYTES ABSOLUTE: 1.76 E9/L (ref 1.5–4)
LYMPHOCYTES RELATIVE PERCENT: 12.2 % (ref 20–42)
Lab: NORMAL
MCH RBC QN AUTO: 28.3 PG (ref 26–35)
MCHC RBC AUTO-ENTMCNC: 31.1 % (ref 32–34.5)
MCV RBC AUTO: 91 FL (ref 80–99.9)
MONOCYTES ABSOLUTE: 0.93 E9/L (ref 0.1–0.95)
MONOCYTES RELATIVE PERCENT: 6.5 % (ref 2–12)
NEGATIVE QC PASS/FAIL: NORMAL
NEUTROPHILS ABSOLUTE: 11.54 E9/L (ref 1.8–7.3)
NEUTROPHILS RELATIVE PERCENT: 80.1 % (ref 43–80)
PDW BLD-RTO: 12.8 FL (ref 11.5–15)
PLATELET # BLD: 307 E9/L (ref 130–450)
PMV BLD AUTO: 10.5 FL (ref 7–12)
POSITIVE QC PASS/FAIL: NORMAL
POTASSIUM REFLEX MAGNESIUM: 4.3 MMOL/L (ref 3.5–5)
RBC # BLD: 4.35 E12/L (ref 3.5–5.5)
SEDIMENTATION RATE, ERYTHROCYTE: 8 MM/HR (ref 0–20)
SODIUM BLD-SCNC: 138 MMOL/L (ref 132–146)
TOTAL PROTEIN: 6.9 G/DL (ref 6.4–8.3)
WBC # BLD: 14.4 E9/L (ref 4.5–11.5)

## 2021-04-09 PROCEDURE — C9113 INJ PANTOPRAZOLE SODIUM, VIA: HCPCS | Performed by: STUDENT IN AN ORGANIZED HEALTH CARE EDUCATION/TRAINING PROGRAM

## 2021-04-09 PROCEDURE — 85014 HEMATOCRIT: CPT

## 2021-04-09 PROCEDURE — 83550 IRON BINDING TEST: CPT

## 2021-04-09 PROCEDURE — APPSS45 APP SPLIT SHARED TIME 31-45 MINUTES: Performed by: NURSE PRACTITIONER

## 2021-04-09 PROCEDURE — 6360000002 HC RX W HCPCS: Performed by: NURSE PRACTITIONER

## 2021-04-09 PROCEDURE — C9113 INJ PANTOPRAZOLE SODIUM, VIA: HCPCS | Performed by: HOSPITALIST

## 2021-04-09 PROCEDURE — 82728 ASSAY OF FERRITIN: CPT

## 2021-04-09 PROCEDURE — 86140 C-REACTIVE PROTEIN: CPT

## 2021-04-09 PROCEDURE — 36415 COLL VENOUS BLD VENIPUNCTURE: CPT

## 2021-04-09 PROCEDURE — 1200000000 HC SEMI PRIVATE

## 2021-04-09 PROCEDURE — G0378 HOSPITAL OBSERVATION PER HR: HCPCS

## 2021-04-09 PROCEDURE — 96365 THER/PROPH/DIAG IV INF INIT: CPT

## 2021-04-09 PROCEDURE — 99284 EMERGENCY DEPT VISIT MOD MDM: CPT

## 2021-04-09 PROCEDURE — 74177 CT ABD & PELVIS W/CONTRAST: CPT

## 2021-04-09 PROCEDURE — 85651 RBC SED RATE NONAUTOMATED: CPT

## 2021-04-09 PROCEDURE — 80053 COMPREHEN METABOLIC PANEL: CPT

## 2021-04-09 PROCEDURE — 6370000000 HC RX 637 (ALT 250 FOR IP): Performed by: STUDENT IN AN ORGANIZED HEALTH CARE EDUCATION/TRAINING PROGRAM

## 2021-04-09 PROCEDURE — 2580000003 HC RX 258: Performed by: NURSE PRACTITIONER

## 2021-04-09 PROCEDURE — 85025 COMPLETE CBC W/AUTO DIFF WBC: CPT

## 2021-04-09 PROCEDURE — 96375 TX/PRO/DX INJ NEW DRUG ADDON: CPT

## 2021-04-09 PROCEDURE — 6360000002 HC RX W HCPCS: Performed by: STUDENT IN AN ORGANIZED HEALTH CARE EDUCATION/TRAINING PROGRAM

## 2021-04-09 PROCEDURE — 2580000003 HC RX 258: Performed by: HOSPITALIST

## 2021-04-09 PROCEDURE — 6360000004 HC RX CONTRAST MEDICATION: Performed by: RADIOLOGY

## 2021-04-09 PROCEDURE — 2500000003 HC RX 250 WO HCPCS: Performed by: NURSE PRACTITIONER

## 2021-04-09 PROCEDURE — 6360000002 HC RX W HCPCS: Performed by: HOSPITALIST

## 2021-04-09 PROCEDURE — 96361 HYDRATE IV INFUSION ADD-ON: CPT

## 2021-04-09 PROCEDURE — 85018 HEMOGLOBIN: CPT

## 2021-04-09 PROCEDURE — 83540 ASSAY OF IRON: CPT

## 2021-04-09 PROCEDURE — 2580000003 HC RX 258: Performed by: STUDENT IN AN ORGANIZED HEALTH CARE EDUCATION/TRAINING PROGRAM

## 2021-04-09 PROCEDURE — 96374 THER/PROPH/DIAG INJ IV PUSH: CPT

## 2021-04-09 PROCEDURE — 96366 THER/PROPH/DIAG IV INF ADDON: CPT

## 2021-04-09 PROCEDURE — 2500000003 HC RX 250 WO HCPCS: Performed by: STUDENT IN AN ORGANIZED HEALTH CARE EDUCATION/TRAINING PROGRAM

## 2021-04-09 RX ORDER — FENTANYL CITRATE 50 UG/ML
25 INJECTION, SOLUTION INTRAMUSCULAR; INTRAVENOUS ONCE
Status: DISCONTINUED | OUTPATIENT
Start: 2021-04-09 | End: 2021-04-11 | Stop reason: HOSPADM

## 2021-04-09 RX ORDER — 0.9 % SODIUM CHLORIDE 0.9 %
1000 INTRAVENOUS SOLUTION INTRAVENOUS ONCE
Status: COMPLETED | OUTPATIENT
Start: 2021-04-09 | End: 2021-04-09

## 2021-04-09 RX ORDER — SODIUM CHLORIDE 0.9 % (FLUSH) 0.9 %
5-40 SYRINGE (ML) INJECTION EVERY 12 HOURS SCHEDULED
Status: DISCONTINUED | OUTPATIENT
Start: 2021-04-09 | End: 2021-04-11 | Stop reason: HOSPADM

## 2021-04-09 RX ORDER — PREDNISONE 20 MG/1
40 TABLET ORAL DAILY
Status: DISCONTINUED | OUTPATIENT
Start: 2021-04-10 | End: 2021-04-11 | Stop reason: HOSPADM

## 2021-04-09 RX ORDER — ONDANSETRON 2 MG/ML
4 INJECTION INTRAMUSCULAR; INTRAVENOUS EVERY 6 HOURS PRN
Status: DISCONTINUED | OUTPATIENT
Start: 2021-04-09 | End: 2021-04-11 | Stop reason: HOSPADM

## 2021-04-09 RX ORDER — SODIUM CHLORIDE 9 MG/ML
INJECTION, SOLUTION INTRAVENOUS CONTINUOUS
Status: DISCONTINUED | OUTPATIENT
Start: 2021-04-09 | End: 2021-04-11

## 2021-04-09 RX ORDER — PANTOPRAZOLE SODIUM 40 MG/10ML
40 INJECTION, POWDER, LYOPHILIZED, FOR SOLUTION INTRAVENOUS ONCE
Status: COMPLETED | OUTPATIENT
Start: 2021-04-09 | End: 2021-04-09

## 2021-04-09 RX ORDER — PANTOPRAZOLE SODIUM 40 MG/1
40 TABLET, DELAYED RELEASE ORAL
Status: DISCONTINUED | OUTPATIENT
Start: 2021-04-10 | End: 2021-04-09

## 2021-04-09 RX ORDER — PREDNISONE 20 MG/1
40 TABLET ORAL ONCE
Status: COMPLETED | OUTPATIENT
Start: 2021-04-09 | End: 2021-04-09

## 2021-04-09 RX ORDER — SODIUM CHLORIDE 9 MG/ML
25 INJECTION, SOLUTION INTRAVENOUS PRN
Status: DISCONTINUED | OUTPATIENT
Start: 2021-04-09 | End: 2021-04-11 | Stop reason: HOSPADM

## 2021-04-09 RX ORDER — ACETAMINOPHEN 650 MG/1
650 SUPPOSITORY RECTAL EVERY 6 HOURS PRN
Status: DISCONTINUED | OUTPATIENT
Start: 2021-04-09 | End: 2021-04-11 | Stop reason: HOSPADM

## 2021-04-09 RX ORDER — ACETAMINOPHEN 325 MG/1
650 TABLET ORAL EVERY 6 HOURS PRN
Status: DISCONTINUED | OUTPATIENT
Start: 2021-04-09 | End: 2021-04-11 | Stop reason: HOSPADM

## 2021-04-09 RX ORDER — POLYETHYLENE GLYCOL 3350 17 G/17G
17 POWDER, FOR SOLUTION ORAL DAILY PRN
Status: DISCONTINUED | OUTPATIENT
Start: 2021-04-09 | End: 2021-04-11 | Stop reason: HOSPADM

## 2021-04-09 RX ORDER — PANTOPRAZOLE SODIUM 40 MG/10ML
40 INJECTION, POWDER, LYOPHILIZED, FOR SOLUTION INTRAVENOUS 2 TIMES DAILY
Status: DISCONTINUED | OUTPATIENT
Start: 2021-04-09 | End: 2021-04-11 | Stop reason: HOSPADM

## 2021-04-09 RX ORDER — NORETHINDRONE AND ETHINYL ESTRADIOL AND FERROUS FUMARATE 0.8-25(24)
1 KIT ORAL NIGHTLY
Status: DISCONTINUED | OUTPATIENT
Start: 2021-04-09 | End: 2021-04-11 | Stop reason: HOSPADM

## 2021-04-09 RX ORDER — SODIUM CHLORIDE 0.9 % (FLUSH) 0.9 %
5-40 SYRINGE (ML) INJECTION PRN
Status: DISCONTINUED | OUTPATIENT
Start: 2021-04-09 | End: 2021-04-11 | Stop reason: HOSPADM

## 2021-04-09 RX ORDER — CIPROFLOXACIN 2 MG/ML
400 INJECTION, SOLUTION INTRAVENOUS EVERY 12 HOURS
Status: DISCONTINUED | OUTPATIENT
Start: 2021-04-09 | End: 2021-04-11 | Stop reason: HOSPADM

## 2021-04-09 RX ORDER — PROMETHAZINE HYDROCHLORIDE 25 MG/1
12.5 TABLET ORAL EVERY 6 HOURS PRN
Status: DISCONTINUED | OUTPATIENT
Start: 2021-04-09 | End: 2021-04-11 | Stop reason: HOSPADM

## 2021-04-09 RX ORDER — HYDROCODONE BITARTRATE AND ACETAMINOPHEN 5; 325 MG/1; MG/1
1 TABLET ORAL EVERY 4 HOURS PRN
Status: DISCONTINUED | OUTPATIENT
Start: 2021-04-09 | End: 2021-04-11 | Stop reason: HOSPADM

## 2021-04-09 RX ORDER — SODIUM CHLORIDE 9 MG/ML
10 INJECTION INTRAVENOUS 2 TIMES DAILY
Status: DISCONTINUED | OUTPATIENT
Start: 2021-04-09 | End: 2021-04-11 | Stop reason: HOSPADM

## 2021-04-09 RX ADMIN — PANTOPRAZOLE SODIUM 40 MG: 40 INJECTION, POWDER, FOR SOLUTION INTRAVENOUS at 22:01

## 2021-04-09 RX ADMIN — SODIUM CHLORIDE 1000 ML: 9 INJECTION, SOLUTION INTRAVENOUS at 10:38

## 2021-04-09 RX ADMIN — CIPROFLOXACIN 400 MG: 2 INJECTION, SOLUTION INTRAVENOUS at 15:29

## 2021-04-09 RX ADMIN — METRONIDAZOLE 500 MG: 500 INJECTION, SOLUTION INTRAVENOUS at 13:54

## 2021-04-09 RX ADMIN — SODIUM CHLORIDE 10 ML: 9 INJECTION INTRAMUSCULAR; INTRAVENOUS; SUBCUTANEOUS at 22:01

## 2021-04-09 RX ADMIN — SODIUM CHLORIDE: 9 INJECTION, SOLUTION INTRAVENOUS at 14:52

## 2021-04-09 RX ADMIN — METRONIDAZOLE 500 MG: 500 INJECTION, SOLUTION INTRAVENOUS at 22:01

## 2021-04-09 RX ADMIN — IOPAMIDOL 75 ML: 755 INJECTION, SOLUTION INTRAVENOUS at 12:55

## 2021-04-09 RX ADMIN — PREDNISONE 40 MG: 20 TABLET ORAL at 13:54

## 2021-04-09 RX ADMIN — SODIUM CHLORIDE, PRESERVATIVE FREE 10 ML: 5 INJECTION INTRAVENOUS at 22:01

## 2021-04-09 RX ADMIN — PANTOPRAZOLE SODIUM 40 MG: 40 INJECTION, POWDER, FOR SOLUTION INTRAVENOUS at 10:38

## 2021-04-09 RX ADMIN — CEFTRIAXONE 1000 MG: 1 INJECTION, POWDER, FOR SOLUTION INTRAMUSCULAR; INTRAVENOUS at 13:54

## 2021-04-09 ASSESSMENT — ENCOUNTER SYMPTOMS
BLOOD IN STOOL: 1
ABDOMINAL PAIN: 1
DIARRHEA: 1
NAUSEA: 1
BACK PAIN: 0
SORE THROAT: 0
SHORTNESS OF BREATH: 0
RHINORRHEA: 0
COUGH: 0
VOMITING: 1

## 2021-04-09 ASSESSMENT — PAIN SCALES - GENERAL: PAINLEVEL_OUTOF10: 0

## 2021-04-09 ASSESSMENT — PAIN DESCRIPTION - ORIENTATION: ORIENTATION: LOWER

## 2021-04-09 ASSESSMENT — PAIN DESCRIPTION - DESCRIPTORS: DESCRIPTORS: CRAMPING

## 2021-04-09 NOTE — CONSULTS
400 mg Intravenous Q12H URIEL Lange  mL/hr at 04/09/21 1529 400 mg at 04/09/21 1529    metronidazole (FLAGYL) 500 mg in NaCl 100 mL IVPB premix  500 mg Intravenous Q8H URIEL Lange CNP        [START ON 4/10/2021] pantoprazole (PROTONIX) tablet 40 mg  40 mg Oral QAM  URIEL Lange - CNP       [facility] ON 4/10/2021] predniSONE (DELTASONE) tablet 40 mg  40 mg Oral Daily URIEL Lange - CNP           SocHx:  Social History     Socioeconomic History    Marital status: Single     Spouse name: Not on file    Number of children: Not on file    Years of education: Not on file    Highest education level: Not on file   Occupational History    Not on file   Social Needs    Financial resource strain: Not on file    Food insecurity     Worry: Not on file     Inability: Not on file    Transportation needs     Medical: Not on file     Non-medical: Not on file   Tobacco Use    Smoking status: Never Smoker    Smokeless tobacco: Never Used   Substance and Sexual Activity    Alcohol use: Yes     Comment: couple times a month    Drug use: Never    Sexual activity: Not on file   Lifestyle    Physical activity     Days per week: Not on file     Minutes per session: Not on file    Stress: Not on file   Relationships    Social connections     Talks on phone: Not on file     Gets together: Not on file     Attends L[employer]-day service: Not on file     Active member of club or organization: Not on file     Attends meetings of clubs or organizations: Not on file     Relationship status: Not on file    Intimate partner violence     Fear of current or ex partner: Not on file     Emotionally abused: Not on file     Physically abused: Not on file     Forced sexual activity: Not on file   Other Topics Concern    Not on file   Social History Narrative    Not on file       FamHx:  Family History   Problem Relation Age of Onset    No Known Problems Mother     No Known Problems Father        Allergy:  Allergies   Allergen Reactions    Food Anaphylaxis and Swelling     Cilantro apples cherries  Can eat in small quantities and take benadryl after per pt. TOUNGE SWELLING    Seasonal          ROS: As described in the HPI and in addition is negative upon detailed review of systems or unobtainable unless otherwise stated in this dictation. PE:  /84   Pulse 77   Temp 98.3 °F (36.8 °C) (Oral)   Resp 16   Ht 5' 8\" (1.727 m)   Wt 174 lb (78.9 kg)   LMP 03/09/2021   SpO2 99%   BMI 26.46 kg/m²     Gen.: NAD/ female  Head: Head is atraumatic/normocephalic  Eyes: EOMI/anicteric sclera/no conjunctival erythema  ENT: No assist with dressing and appears slightly congested. Moist oral mucosa  Neck: Supple with trachea midline  Chest: Symmetrical excursion/nonlabored respirations  Cor: Regular  Abd.: Soft and obese. Mildly tender in the lower abdomen  Extr.:  No peripheral edema  Muscles: Tone and bulk, consistent with age and condition  Skin: Warm and dry    DATA:     Lab Results   Component Value Date    WBC 14.4 04/09/2021    RBC 4.35 04/09/2021    HGB 12.3 04/09/2021    HCT 39.6 04/09/2021    MCV 91.0 04/09/2021    MCH 28.3 04/09/2021    MCHC 31.1 04/09/2021    RDW 12.8 04/09/2021     04/09/2021    MPV 10.5 04/09/2021     Lab Results   Component Value Date     04/09/2021    K 4.3 04/09/2021     04/09/2021    CO2 26 04/09/2021    BUN 9 04/09/2021    CREATININE 1.0 04/09/2021    CALCIUM 9.1 04/09/2021    PROT 6.9 04/09/2021    LABALBU 3.9 04/09/2021    BILITOT 0.6 04/09/2021    ALKPHOS 57 04/09/2021    AST 23 04/09/2021    ALT 18 04/09/2021     No results found for: LIPASE  No results found for: AMYLASE      ASSESSMENT/PLAN:  1.   Pancolitis  -Differential diagnosis include infectious versus inflammatory or less likely ischemic  -Agree with empiric antibiotics  -Agree with full liquid diet  -Obtain stool studies if not done  -Advance diet depending on hospital course and diagnostic studies  -Further evaluation with nonemergent colonoscopy which possibly can be performed as outpatient    2. GI bleed  -Lower gastrointestinal bleed likely related to above described pancolitis  -Cannot exclude upper source however black stool possibly secondary to ingested blood  -Increase PPI to twice a day IV dosing  -Monitor H&H initially every 8 hours  -In case of precipitous drop in H&H or evidence of overt bleed from upper GI tract consider upper endoscopy (caution as patient has nasal packing)    3. Nasal fracture status post closed reduction   -Per admitting/pertinent consultants    Above has been discussed with the patient and all questions answered to her satisfaction. She is agreeable with the plan as delineated. Thank you for the opportunity see this patient in consultation      Aleta Haywood MD  4/9/2021  4:49 PM    NOTE:  This report was transcribed using voice recognition software. Every effort was made to ensure accuracy; however, inadvertent computerized transcription errors may be present.

## 2021-04-09 NOTE — ED PROVIDER NOTES
[facility]  Department of Emergency Medicine     Written by: David Vides DO  Patient Name: Ana Knutson  Attending Provider: Yamile Savage MD  Admit Date: 2021  9:49 AM  MRN: [id number]                   : 1995        Chief Complaint   Patient presents with    Rectal Bleeding     bright red blood in stool x2    Abdominal Cramping     lower    - Chief complaint    HPI     Patient is a 30-year-old female with recent nasal bone fracture s/p repair  with ENT, who presents the ED due to chief complaint of bloody stools and diarrhea. Also endorses nausea with 1 episode of vomiting. Also endorses lower abdominal pain/cramping and lightheadedness. Symptoms are moderate in severity, not made better or worse by anything specific, and began gradually yesterday around 1700 and have since been worsening. Patient states she initially had a few dark black bowel movements and since then has been having numerous bright red bloody stools. Denies any rectal pain, denies any history of hemorrhoids, denies any history of the symptoms. Her only medical history is migraines, no other significant history; denies any history of abdominal surgeries. She states that she so far has taken to 5 mg Norco pills since her surgery, she did call her physician office and informed her this is unlikely the source of her symptoms. She was directed to the ER. Patient denies any recent illnesses, fevers, headache, confusion, cough or sore throat, neck pain or stiffness, chest pain or palpitations, urinary symptoms, lower extremity edema or tenderness, numbness or tingling anywhere. Review of Systems   Constitutional: Positive for fatigue. Negative for chills and fever. HENT: Negative for rhinorrhea and sore throat. Recent nasal bone fracture repair; no issues or complaints. Eyes: Negative for visual disturbance. Respiratory: Negative for cough and shortness of breath. Cardiovascular: Negative for chest pain and palpitations. Gastrointestinal: Positive for abdominal pain, blood in stool, diarrhea, nausea and vomiting. Endocrine: Negative for polydipsia and polyuria. Genitourinary: Negative for dysuria and frequency. Musculoskeletal: Negative for back pain and myalgias. Skin: Negative for rash and wound. Neurological: Positive for light-headedness. Negative for weakness and headaches. Psychiatric/Behavioral: Negative for confusion. All other systems reviewed and are negative. Physical Exam  Vitals signs and nursing note reviewed. Exam conducted with a chaperone present. HENT:      Head: Normocephalic and atraumatic. Right Ear: External ear normal.      Left Ear: External ear normal.      Nose: Nose normal. No rhinorrhea. Mouth/Throat:      Mouth: Mucous membranes are dry. Pharynx: Oropharynx is clear. Eyes:      Extraocular Movements: Extraocular movements intact. Conjunctiva/sclera: Conjunctivae normal.      Pupils: Pupils are equal, round, and reactive to light. Neck:      Musculoskeletal: Normal range of motion and neck supple. No neck rigidity or muscular tenderness. Cardiovascular:      Rate and Rhythm: Regular rhythm. Tachycardia present. Pulses: Normal pulses. Heart sounds: Normal heart sounds. Pulmonary:      Effort: Pulmonary effort is normal. No respiratory distress. Breath sounds: Normal breath sounds. No wheezing or rales. Abdominal:      General: Bowel sounds are normal. There is no distension. Palpations: Abdomen is soft. Tenderness: There is abdominal tenderness (lower, non-focal). There is no right CVA tenderness, left CVA tenderness, guarding or rebound. Genitourinary:     Rectum: Guaiac result positive. Comments: Non-bleeding external hemorrhoid in 7 o'clock position. Musculoskeletal: Normal range of motion. Right lower leg: No edema. Left lower leg: No edema.    Skin: General: Skin is warm and dry. Capillary Refill: Capillary refill takes less than 2 seconds. Neurological:      General: No focal deficit present. Mental Status: She is alert and oriented to person, place, and time. Psychiatric:         Mood and Affect: Mood normal.         Behavior: Behavior normal.          Procedures       MDM     31 y/o female presents due to fatigue, diarrhea, bloody stools, and lower abdominal pain. Patient AAOx4; vitals stable. There is mild lower abdominal tenderness that is non-focal; abdomen is soft, there is no rebound, guarding, or rigidity. Patient does not appear pale, cyanotic, or mottled. Rectal exam performed and shows non-bleeding external hemorrhoid in the 7 o'clock position; stool is brown, no gross blood noted; stool hemoccult positive. Labs significant for mild leukocytosis of 14.4; otherwise generally unremarkable. CT abd/pelv shows findings consistent with near-pancolitis. Patient given flagyl, rocephin, protonix, and prednisone; discussed results and findings with her, plan for admission; she is amenable and her questions are answered. Spoke with GI, discussed case; they agree to provide consultation for this patient. Spoke with hospitalist, discussed case, this patient is accepted for admission. I have discussed this patient with my attending, who has seen the patient and agrees with this disposition. Patient was seen and evaluated by myself and my attending Quentin Choi MD. Assessment and Plan discussed with attending provider, please see attestation for final plan of care. ED Course as of Apr 09 2147 Fri Apr 09, 2021   1110 Rectal exam performed with chaperone at bedside; no gross blood visualized; small amount of brown stool tested hemoccult positive; appears to be one non-bleeding external hemorrhoid in the 7 o'clock position; no other abnormalities noted.      [VG]   18 Spoke with Dr. Robinson Sandoval (GI), discussed case; they agree to provide inpatient consultation for this patient. [VG]   1917 Patient reassessed, she was updated on results and plan for admission; she is amenable and her questions are answered. There is moderate lower abdominal tenderness on repeat exam; 25 mcg IV fenantyl ordered. [VG]   [phone] with Dr. Stanton Huston, discussed case, this patient is accepted for admission.     [VG]      ED Course User Index  [VG] Lucy Celaya, DO       --------------------------------------------- PAST HISTORY ---------------------------------------------  Past Medical History:  has a past medical history of Migraine headache. Past Surgical History:  has a past surgical history that includes [place] tooth extraction; sinus surgery (2007); and turbinate resection (N/A, 4/7/2021). Social History:  reports that she has never smoked. She has never used smokeless tobacco. She reports current alcohol use. She reports that she does not use drugs. Family History: family history includes No Known Problems in her father and mother. The patients home medications have been reviewed.     Allergies: Food and Seasonal    -------------------------------------------------- RESULTS -------------------------------------------------    LABS:  Results for orders placed or performed during the hospital encounter of 04/09/21   CBC Auto Differential   Result Value Ref Range    WBC 14.4 (H) 4.5 - 11.5 E9/L    RBC 4.35 3.50 - 5.50 E12/L    Hemoglobin 12.3 11.5 - 15.5 g/dL    Hematocrit 39.6 34.0 - 48.0 %    MCV 91.0 80.0 - 99.9 fL    MCH 28.3 26.0 - 35.0 pg    MCHC 31.1 (L) 32.0 - 34.5 %    RDW 12.8 11.5 - 15.0 fL    Platelets 723 032 - 255 E9/L    MPV 10.5 7.0 - 12.0 fL    Neutrophils % 80.1 (H) 43.0 - 80.0 %    Immature Granulocytes % 0.3 0.0 - 5.0 %    Lymphocytes % 12.2 (L) 20.0 - 42.0 %    Monocytes % 6.5 2.0 - 12.0 %    Eosinophils % 0.2 0.0 - 6.0 %    Basophils % 0.7 0.0 - 2.0 %    Neutrophils Absolute 11.54 (H) 1.80 - 7.30 E9/L    Immature Granulocytes # 0.05 E9/L    Lymphocytes Absolute 1.76 1.50 - 4.00 E9/L    Monocytes Absolute 0.93 0.10 - 0.95 E9/L    Eosinophils Absolute 0.03 (L) 0.05 - 0.50 E9/L    Basophils Absolute 0.10 0.00 - 0.20 E9/L   Comprehensive Metabolic Panel w/ Reflex to MG   Result Value Ref Range    Sodium 138 132 - 146 mmol/L    Potassium reflex Magnesium 4.3 3.5 - 5.0 mmol/L    Chloride 105 98 - 107 mmol/L    CO2 26 22 - 29 mmol/L    Anion Gap 7 7 - 16 mmol/L    Glucose 99 74 - 99 mg/dL    BUN 9 6 - 20 mg/dL    CREATININE 1.0 0.5 - 1.0 mg/dL    GFR Non-African American >60 >=60 mL/min/1.73    GFR African American >60     Calcium 9.1 8.6 - 10.2 mg/dL    Total Protein 6.9 6.4 - 8.3 g/dL    Albumin 3.9 3.5 - 5.2 g/dL    Total Bilirubin 0.6 0.0 - 1.2 mg/dL    Alkaline Phosphatase 57 35 - 104 U/L    ALT 18 0 - 32 U/L    AST 23 0 - 31 U/L   C-Reactive Protein   Result Value Ref Range    CRP 2.1 (H) 0.0 - 0.4 mg/dL   Sedimentation Rate   Result Value Ref Range    Sed Rate 8 0 - 20 mm/Hr   POC Pregnancy Urine   Result Value Ref Range    HCG, Urine, POC Negative Negative    Lot Number [id number]     Positive QC Pass/Fail Pass     Negative QC Pass/Fail Pass        RADIOLOGY:  CT ABDOMEN PELVIS W IV CONTRAST Additional Contrast? None   Final Result   1. Findings compatible with near pancolitis, as above commented. 2.  Fluid accumulation in the right adnexa/a cul-de-sac could be relate with   recent rupture of follicular cyst.  Can correlate with pelvic ultrasound.               ------------------------- NURSING NOTES AND VITALS REVIEWED ---------------------------  Date / Time Roomed:  4/9/2021  9:49 AM  ED Bed Assignment:  [id number]    The nursing notes within the ED encounter and vital signs as below have been reviewed.      Patient Vitals for the past 24 hrs:   BP Temp Temp src Pulse Resp SpO2 Height Weight   04/09/21 1445 134/84 98.3 °F (36.8 °C) Oral 77 16 99 %     04/09/21 1359 129/74   94 16 100 %     04/09/21 1150 124/75   79 16 98 %     04/09/21 1033 131/76   87 16 98 % 5' 8\" (1.727 m) 174 lb (78.9 kg)   04/09/21 0932  97.6 °F (36.4 °C)             Oxygen Saturation Interpretation: Normal    ------------------------------------------ PROGRESS NOTES ------------------------------------------  Re-evaluation(s):  Please see ED course    Counseling:  I have spoken with the patient and grandfather and discussed todays results, in addition to providing specific details for the plan of care and counseling regarding the diagnosis and prognosis. Their questions are answered at this time and they are agreeable with the plan of admission.    --------------------------------- ADDITIONAL PROVIDER NOTES ---------------------------------  Consultations:  Please see ED course    This patient's ED course included: a personal history and physicial examination, re-evaluation prior to disposition, multiple bedside re-evaluations, IV medications, cardiac monitoring, continuous pulse oximetry and complex medical decision making and emergency management    This patient has remained hemodynamically stable during their ED course. Diagnosis:  1. Pancolitis ([address]    2. Gastrointestinal hemorrhage, unspecified gastrointestinal hemorrhage type    3. Non-intractable vomiting with nausea, unspecified vomiting type    4. Lower abdominal pain        Disposition:  Patient's disposition: Admit to telemetry  Patient's condition is stable.        Demetra Savage DO  Resident  04/09/21 8635

## 2021-04-09 NOTE — H&P
[facility]   History and Physical      CHIEF COMPLAINT: Bloody stool/abdominal discomfort/N/V. History of Present Illness:  32 y.o. female with a history of nasal bone fracture with repair 4/7/2021. Other significant medical history noted. Patient complaint is moderate in severity, persistent, worsened by nothing. Patient states yesterday evening she developed dark-colored loose stools that have worsened. They initially dark with stool. She then states that bright red blood was noted without stool from rectum. Today she continues to have loose stools with bright red blood noted. She also endorses lower abdominal pain with cramping. Patient states she has had 1 episode of emesis during which she developed some lightheadedness. Patient had taken Norco 5 mg for nasal pain. States that once medication had worn off she developed symptoms and was concerned that the [address] had caused the issues. Patient grandfather at bedside. Patient denies fevers, chills, CP,SOB, recent illness, or being around ill person(s). Patient noted with leukocytosis WBC 14.4. CT of abdomen reveals evidence for thickening of bowel wall/bowel wall edema with some stranding of the pericolonic fat planes or increased vascularity in the pericolonic spaces particularly towards the distal transverse colon, splenic flexure of the colon and descending colon. Compatible with near full pancolitis. Patient will be admitted for further evaluation and treatment. Informant(s) for H&P: Patient, grandfather, and EMR    REVIEW OF SYSTEMS:  Patient admits to bloody stools, abdominal pain, N/V. No fevers, chills, cp, sob, ha, vision/hearing changes, wt changes, hot/cold flashes, other open skin lesions, constipation, dysuria/hematuria unless noted in HPI. Complete ROS performed with the patient and is otherwise negative.       PMH:  Past Medical History:   Diagnosis Date    Migraine headache 10/31/2018 Surgical History:  Past Surgical History:   Procedure Laterality Date    SINUS SURGERY  2007    Polyps removed not certain site ie right, left or bilateral    TURBINATE RESECTION N/A 4/7/2021    CLOSED REDUCTION NASAL BONE FRACTURE, SUBMUCOUSAL RESECTION OF INFERIOR TURBINATES performed by Jose Alfredo Aiken MD at [address] [place] [address]         Medications Prior to Admission:    Prior to Admission medications    Medication Sig Start Date End Date Taking? Authorizing Provider   HYDROcodone-acetaminophen (NORCO) 5-325 MG per tablet Take 1 tablet by mouth every 4 hours as needed for Pain for up to 7 days. 4/7/21 4/14/21 Yes Yaya Baxter,    KAITLIB FE 0.8-25 MG-MCG CHEW Take 1 tablet by mouth nightly  9/2/18  Yes Historical Provider, MD   EPINEPHrine (EPIPEN) 0.3 MG/0.3ML SOAJ injection USE 1 PEN AS NEEDED. MAY REPEAT EVERY 15-20 MINUTES 10/18/18  Yes Historical Provider, MD   Fexofenadine HCl (ALLEGRA PO) Take 1 tablet by mouth daily    Historical Provider, MD       Allergies:    Food and Seasonal    Social History:    reports that she has never smoked. She has never used smokeless tobacco. She reports current alcohol use. She reports that she does not use drugs. Family History:   family history is not on file. Reviewed with patient no updates needed at this time.     PHYSICAL EXAM:  Vitals:  /75   Pulse 79   Temp 97.6 °F (36.4 °C)   Resp 16   Ht 5' 8\" (1.727 m)   Wt 174 lb (78.9 kg)   LMP 03/19/2021   SpO2 98%   BMI 26.46 kg/m²     General Appearance: alert and oriented to person, place and time and in no acute distress  Skin: warm and dry  Head: normocephalic and atraumatic  Eyes: pupils equal, round, and reactive to light, extraocular eye movements intact, conjunctivae normal  Neck: neck supple and non tender without mass   Pulmonary/Chest: clear to auscultation bilaterally- no wheezes, rales or rhonchi, normal air movement, no respiratory distress  Cardiovascular: normal mesenteric adenopathy. The small bowel has unremarkable appearance. There is no acute inflammatory process in the omental mesenteric fat planes considering the stranding of the fat planes in the transverse mesentery a and the in the pericolonic spaces of the descending colon particular. There is no indication for bowel obstruction. In the pelvic cavity the bladder is empty. There are unremarkable appearance for the uterus and ovaries. Some fluid accumulation is seen around the right ovary and in the lower pelvic region. These findings could not be direct related with the near pancolitis, they can be relate with recent rupture of a follicular cyst of the right ovary. Can correlate with pelvic ultrasound. There are normal size and cortical enhancement for the kidneys. There is no signs for obstruction. Adrenals are not enlarged. There are normal size enhancement for the liver, the spleen and pancreas. Gallbladder is normally distended. There is no dilatation of the biliary tree pancreatic ductal system. Lower lung bases demonstrate the no significant findings. Bone structures are of unremarkable appearance. 1.  Findings compatible with near pancolitis, as above commented. 2.  Fluid accumulation in the right adnexa/a cul-de-sac could be relate with recent rupture of follicular cyst.  Can correlate with pelvic ultrasound. EKG: None    ASSESSMENT:      Active Problems:    * No active hospital problems. *  Resolved Problems:    * No resolved hospital problems. *      PLAN:    1. PanColitis No significant medical history. 4/9/2021 CT of abdomen compatible with near full pancolitis with relative sparing of the proximal and mid segments of the ascending colon, cecum and the region of the ileocecal valve and terminal ileum. Received ceftriaxone/Flagyl/Protonix/prednisoneIVF hydration in ED course. Continue prednisone/Protonix/Flagyl/Cipro. Obtain stool cultures. CRP/ESR. GI input appreciated.   2. Leukocytosis-likely 2/2 above. WBC 14.4. Received Flagyl/ceftriaxone in ED course COVID-19 negative. Stool cultures pending. Continue Flagyl/ceftriaxone/IVF hydration follow closely. 3. Abdominal pain2/2 #1. Continue analgesics. Abx/steroids/PPI. Follow abdominal exam closely. 4. BRBPR-initially noted to be dark in color. Several episodes of bright red blood with out stool per patient. PPI. Hgb 12.3. GI input appreciated. 5. Recent repair of nasal bone fracture-4/7/2021 close reduction nasal bone fracture/submucosa resection of inferior turbinates per Dr. Raj Kraft. Continue analgesics. Code Status: Full  DVT prophylaxis: SCDs    NOTE: This report was transcribed using voice recognition software. Every effort was made to ensure accuracy; however, inadvertent computerized transcription errors may be present.     Electronically signed by Ena Carnes. Nayana Lincoln CNP on 4/9/2021 at 1:44 PM    Addendum: I have personally participated in the history, exam, medical decision making with the APRN on the date of service and I agree with all of the pertinent clinical information unless otherwise noted. I have also reviewed and agree with the past medical, family, and social history unless otherwise noted.     PHYSICAL EXAM:  Vitals:   Vitals:    04/09/21 1445   BP: 134/84   Pulse: 77   Resp: 16   Temp: 98.3 °F (36.8 °C)   SpO2: 99%      General Appearance: alert and oriented to person, place and time, well developed and well- nourished, in no acute distress  Skin: warm and dry, no rash or erythema  Head: normocephalic and atraumatic  Eyes: pupils equal, round, and reactive to light, extraocular eye movements intact, conjunctivae normal  ENT: Has nasal bandage on, nose appears otherwise normal  Neck: supple and non-tender without mass, no thyromegaly or thyroid nodules, no cervical lymphadenopathy  Pulmonary/Chest: clear to auscultation bilaterally- no wheezes, rales or rhonchi, normal air movement, no respiratory distress  Cardiovascular: normal rate, regular rhythm, normal S1 and S2, no murmurs, rubs, clicks, or gallops, distal pulses intact, no carotid bruits  Abdomen: soft, some right sided tenderness, but no guarding, benign abdomen  Extremities: no cyanosis, clubbing or edema  Musculoskeletal: normal range of motion, no joint swelling, deformity or tenderness  Neurologic: reflexes normal and symmetric, no cranial nerve deficit, gait, coordination and speech normal    Impression:  Active Problems:    Pancolitis (HCC)  Resolved Problems:    * No resolved hospital problems. *      My findings/plan include:    1) Colitis - infectious vs inflammatory, continue antibiotics and steroids. Need stool studies to rule out enterohemorrhagic organisms - shigella and E Coli 0157. Bleeding likely related to this colitis. GI consult appreciated. 2) Recent repair of nasal bone fracture - this appears stable at this time.     Electronically signed by Charissa Garcia MD on 4/9/2021 at 6:34 PM

## 2021-04-10 LAB
ANION GAP SERPL CALCULATED.3IONS-SCNC: 8 MMOL/L (ref 7–16)
BASOPHILS ABSOLUTE: 0.09 E9/L (ref 0–0.2)
BASOPHILS RELATIVE PERCENT: 0.8 % (ref 0–2)
BUN BLDV-MCNC: 9 MG/DL (ref 6–20)
CALCIUM SERPL-MCNC: 8.8 MG/DL (ref 8.6–10.2)
CHLORIDE BLD-SCNC: 105 MMOL/L (ref 98–107)
CO2: 25 MMOL/L (ref 22–29)
CREAT SERPL-MCNC: 0.8 MG/DL (ref 0.5–1)
EOSINOPHILS ABSOLUTE: 0.07 E9/L (ref 0.05–0.5)
EOSINOPHILS RELATIVE PERCENT: 0.6 % (ref 0–6)
FERRITIN: 52 NG/ML
GFR AFRICAN AMERICAN: >60
GFR NON-AFRICAN AMERICAN: >60 ML/MIN/1.73
GLUCOSE BLD-MCNC: 101 MG/DL (ref 74–99)
HCT VFR BLD CALC: 38.1 % (ref 34–48)
HCT VFR BLD CALC: 39.7 % (ref 34–48)
HCT VFR BLD CALC: 42.6 % (ref 34–48)
HEMOGLOBIN: 12.3 G/DL (ref 11.5–15.5)
HEMOGLOBIN: 12.4 G/DL (ref 11.5–15.5)
HEMOGLOBIN: 13.6 G/DL (ref 11.5–15.5)
IMMATURE GRANULOCYTES #: 0.05 E9/L
IMMATURE GRANULOCYTES %: 0.4 % (ref 0–5)
IRON SATURATION: 12 % (ref 15–50)
IRON: 40 MCG/DL (ref 37–145)
LYMPHOCYTES ABSOLUTE: 2.16 E9/L (ref 1.5–4)
LYMPHOCYTES RELATIVE PERCENT: 18.4 % (ref 20–42)
MCH RBC QN AUTO: 28.9 PG (ref 26–35)
MCHC RBC AUTO-ENTMCNC: 32.3 % (ref 32–34.5)
MCV RBC AUTO: 89.6 FL (ref 80–99.9)
MONOCYTES ABSOLUTE: 0.85 E9/L (ref 0.1–0.95)
MONOCYTES RELATIVE PERCENT: 7.2 % (ref 2–12)
NEUTROPHILS ABSOLUTE: 8.51 E9/L (ref 1.8–7.3)
NEUTROPHILS RELATIVE PERCENT: 72.6 % (ref 43–80)
PDW BLD-RTO: 12.7 FL (ref 11.5–15)
PLATELET # BLD: 293 E9/L (ref 130–450)
PMV BLD AUTO: 10.7 FL (ref 7–12)
POTASSIUM SERPL-SCNC: 3.9 MMOL/L (ref 3.5–5)
RBC # BLD: 4.25 E12/L (ref 3.5–5.5)
SODIUM BLD-SCNC: 138 MMOL/L (ref 132–146)
TOTAL IRON BINDING CAPACITY: 337 MCG/DL (ref 250–450)
WBC # BLD: 11.7 E9/L (ref 4.5–11.5)

## 2021-04-10 PROCEDURE — 2500000003 HC RX 250 WO HCPCS: Performed by: NURSE PRACTITIONER

## 2021-04-10 PROCEDURE — 6370000000 HC RX 637 (ALT 250 FOR IP): Performed by: NURSE PRACTITIONER

## 2021-04-10 PROCEDURE — 89055 LEUKOCYTE ASSESSMENT FECAL: CPT

## 2021-04-10 PROCEDURE — 87449 NOS EACH ORGANISM AG IA: CPT

## 2021-04-10 PROCEDURE — G0378 HOSPITAL OBSERVATION PER HR: HCPCS

## 2021-04-10 PROCEDURE — 6360000002 HC RX W HCPCS: Performed by: NURSE PRACTITIONER

## 2021-04-10 PROCEDURE — 6360000002 HC RX W HCPCS: Performed by: HOSPITALIST

## 2021-04-10 PROCEDURE — APPSS30 APP SPLIT SHARED TIME 16-30 MINUTES: Performed by: NURSE PRACTITIONER

## 2021-04-10 PROCEDURE — 1200000000 HC SEMI PRIVATE

## 2021-04-10 PROCEDURE — 80048 BASIC METABOLIC PNL TOTAL CA: CPT

## 2021-04-10 PROCEDURE — 85014 HEMATOCRIT: CPT

## 2021-04-10 PROCEDURE — C9113 INJ PANTOPRAZOLE SODIUM, VIA: HCPCS | Performed by: HOSPITALIST

## 2021-04-10 PROCEDURE — 87324 CLOSTRIDIUM AG IA: CPT

## 2021-04-10 PROCEDURE — 2580000003 HC RX 258: Performed by: NURSE PRACTITIONER

## 2021-04-10 PROCEDURE — 36415 COLL VENOUS BLD VENIPUNCTURE: CPT

## 2021-04-10 PROCEDURE — 99232 SBSQ HOSP IP/OBS MODERATE 35: CPT | Performed by: INTERNAL MEDICINE

## 2021-04-10 PROCEDURE — 2580000003 HC RX 258: Performed by: HOSPITALIST

## 2021-04-10 PROCEDURE — 87045 FECES CULTURE AEROBIC BACT: CPT

## 2021-04-10 PROCEDURE — 85018 HEMOGLOBIN: CPT

## 2021-04-10 PROCEDURE — 85025 COMPLETE CBC W/AUTO DIFF WBC: CPT

## 2021-04-10 PROCEDURE — 83993 ASSAY FOR CALPROTECTIN FECAL: CPT

## 2021-04-10 PROCEDURE — 87425 ROTAVIRUS AG IA: CPT

## 2021-04-10 RX ADMIN — HYDROCODONE BITARTRATE AND ACETAMINOPHEN 1 TABLET: 5; 325 TABLET ORAL at 14:32

## 2021-04-10 RX ADMIN — METRONIDAZOLE 500 MG: 500 INJECTION, SOLUTION INTRAVENOUS at 13:43

## 2021-04-10 RX ADMIN — METRONIDAZOLE 500 MG: 500 INJECTION, SOLUTION INTRAVENOUS at 06:39

## 2021-04-10 RX ADMIN — METRONIDAZOLE 500 MG: 500 INJECTION, SOLUTION INTRAVENOUS at 22:27

## 2021-04-10 RX ADMIN — SODIUM CHLORIDE 10 ML: 9 INJECTION INTRAMUSCULAR; INTRAVENOUS; SUBCUTANEOUS at 19:55

## 2021-04-10 RX ADMIN — PREDNISONE 40 MG: 20 TABLET ORAL at 08:45

## 2021-04-10 RX ADMIN — PANTOPRAZOLE SODIUM 40 MG: 40 INJECTION, POWDER, FOR SOLUTION INTRAVENOUS at 08:45

## 2021-04-10 RX ADMIN — SODIUM CHLORIDE: 9 INJECTION, SOLUTION INTRAVENOUS at 02:46

## 2021-04-10 RX ADMIN — CIPROFLOXACIN 400 MG: 2 INJECTION, SOLUTION INTRAVENOUS at 02:44

## 2021-04-10 RX ADMIN — SODIUM CHLORIDE 10 ML: 9 INJECTION INTRAMUSCULAR; INTRAVENOUS; SUBCUTANEOUS at 08:45

## 2021-04-10 RX ADMIN — CIPROFLOXACIN 400 MG: 2 INJECTION, SOLUTION INTRAVENOUS at 14:34

## 2021-04-10 RX ADMIN — PANTOPRAZOLE SODIUM 40 MG: 40 INJECTION, POWDER, FOR SOLUTION INTRAVENOUS at 19:55

## 2021-04-10 ASSESSMENT — PAIN SCALES - GENERAL
PAINLEVEL_OUTOF10: 0
PAINLEVEL_OUTOF10: 5
PAINLEVEL_OUTOF10: 2

## 2021-04-10 NOTE — PLAN OF CARE
Problem: Pain:  Goal: Pain level will decrease  Description: Pain level will decrease  Outcome: Met This Shift  Goal: Control of acute pain  Description: Control of acute pain  Outcome: Met This Shift  Goal: Control of chronic pain  Description: Control of chronic pain  Outcome: Met This Shift     Problem: Falls - Risk of:  Goal: Will remain free from falls  Description: Will remain free from falls  Outcome: Met This Shift  Goal: Absence of physical injury  Description: Absence of physical injury  Outcome: Met This Shift     Problem: Bleeding:  Goal: Will show no signs and symptoms of gastrointestinal bleeding  Description: Will show no signs and symptoms of gastrointestinal bleeding  Outcome: Met This Shift

## 2021-04-10 NOTE — PROGRESS NOTES
Name:  Divine Wray  :  1995  MRN:  [id number]  Room:  [address] [place] [address]  DOS:  4/10/2021    [address]  [facility]  Dr. Mrecy Perdue M.D. Dr. Viri Fabian M.D. ED [address]., M.D. Dr. Leno Hawley D.O.  Dr. Jazmyne Marin MD    -NP Progress Note-    PCP:  No primary care provider on file. Admitting Physician:  Ovi Rodriguez MD  Chief Complaint:    Chief Complaint   Patient presents with    Rectal Bleeding     bright red blood in stool x2    Abdominal Cramping     lower       Subjective  Patient resting in bed. Easily awakened. Complains of some pain in the abdomen and low back. She has tolerated liquids. She has passed only a couple drops of blood rectally. No significant stool. She denies any dietary indiscretion prior to onset of symptoms. No prior similar episodes.     Physical Examination  Vitals:  /78   Pulse 78   Temp 97.9 °F (36.6 °C) (Oral)   Resp 16   Ht 5' 8\" (1.727 m)   Wt 174 lb (78.9 kg)   LMP 2021   SpO2 99%   BMI 26.46 kg/m²   General Appearance:  awake, alert, and oriented to person, place, time, and purpose; appears stated age and cooperative; no apparent distress no labored breathing  HEENT:  PERRL; EOMI; sclera clear; buccal mucosa moist  Neck:  supple; trachea midline; no thyromegaly; no JVD; no bruits  Heart:  rhythm regular; rate controlled; no murmurs  Lungs:  symmetrical; clear to auscultation bilaterally; no wheezes; no rhonchi; no rales  Abdomen:  soft, mild RLQ tenderness, non-distended; bowel sounds positive; no organomegaly or masses  Extremities:  peripheral pulses present; no peripheral edema; no ulcers  Neurologic:  alert and oriented x 3; no focal deficit; cranial nerves grossly intact  Skin:  no petechia; no hemorrhage; no wounds    Medications  Scheduled Meds    fentanNYL  25 mcg Intravenous Once    Norethin-Eth Estradiol-Fe  1 tablet Oral Nightly    sodium chloride flush  5-40 mL Intravenous 2 times per day    ciprofloxacin  400 mg Intravenous Q12H    metroNIDAZOLE  500 mg Intravenous Q8H    predniSONE  40 mg Oral Daily    pantoprazole  40 mg Intravenous BID    And    sodium chloride (PF)  10 mL Intravenous BID     Infusion Meds    sodium chloride      sodium chloride 75 mL/hr at 04/10/21 0246     PRN Meds HYDROcodone-acetaminophen, sodium chloride flush, sodium chloride, promethazine **OR** ondansetron, polyethylene glycol, acetaminophen **OR** acetaminophen    Laboratory Data  Recent Results (from the past 24 hour(s))   CBC Auto Differential    Collection Time: 04/09/21 10:25 AM   Result Value Ref Range    WBC 14.4 (H) 4.5 - 11.5 E9/L    RBC 4.35 3.50 - 5.50 E12/L    Hemoglobin 12.3 11.5 - 15.5 g/dL    Hematocrit 39.6 34.0 - 48.0 %    MCV 91.0 80.0 - 99.9 fL    MCH 28.3 26.0 - 35.0 pg    MCHC 31.1 (L) 32.0 - 34.5 %    RDW 12.8 11.5 - 15.0 fL    Platelets 829 118 - 588 E9/L    MPV 10.5 7.0 - 12.0 fL    Neutrophils % 80.1 (H) 43.0 - 80.0 %    Immature Granulocytes % 0.3 0.0 - 5.0 %    Lymphocytes % 12.2 (L) 20.0 - 42.0 %    Monocytes % 6.5 2.0 - 12.0 %    Eosinophils % 0.2 0.0 - 6.0 %    Basophils % 0.7 0.0 - 2.0 %    Neutrophils Absolute 11.54 (H) 1.80 - 7.30 E9/L    Immature Granulocytes # 0.05 E9/L    Lymphocytes Absolute 1.76 1.50 - 4.00 E9/L    Monocytes Absolute 0.93 0.10 - 0.95 E9/L    Eosinophils Absolute 0.03 (L) 0.05 - 0.50 E9/L    Basophils Absolute 0.10 0.00 - 0.20 E9/L   Comprehensive Metabolic Panel w/ Reflex to MG    Collection Time: 04/09/21 10:25 AM   Result Value Ref Range    Sodium 138 132 - 146 mmol/L    Potassium reflex Magnesium 4.3 3.5 - 5.0 mmol/L    Chloride 105 98 - 107 mmol/L    CO2 26 22 - 29 mmol/L    Anion Gap 7 7 - 16 mmol/L    Glucose 99 74 - 99 mg/dL    BUN 9 6 - 20 mg/dL    CREATININE 1.0 0.5 - 1.0 mg/dL    GFR Non-African American >60 >=60 mL/min/1.73    GFR African American >60     Calcium 9.1 8.6 - 10.2 mg/dL    Total Protein 6.9 6.4 - 8.3 g/dL    Albumin 3.9 3.5 - 5.2 g/dL    Total Bilirubin 0.6 0.0 - 1.2 mg/dL    Alkaline Phosphatase 57 35 - 104 U/L    ALT 18 0 - 32 U/L    AST 23 0 - 31 U/L   C-Reactive Protein    Collection Time: 04/09/21 10:25 AM   Result Value Ref Range    CRP 2.1 (H) 0.0 - 0.4 mg/dL   Sedimentation Rate    Collection Time: 04/09/21 10:25 AM   Result Value Ref Range    Sed Rate 8 0 - 20 mm/Hr   Hemoglobin and hematocrit, blood    Collection Time: 04/09/21 10:10 PM   Result Value Ref Range    Hemoglobin 11.9 11.5 - 15.5 g/dL    Hematocrit 37.3 34.0 - 48.0 %       Imaging  Ct Abdomen Pelvis W Iv Contrast Additional Contrast? None    Result Date: 4/9/2021  EXAMINATION: CT OF THE ABDOMEN AND PELVIS WITH CONTRAST 4/9/2021 12:55 pm TECHNIQUE: CT of the abdomen and pelvis was performed with the administration of intravenous contrast. Multiplanar reformatted images are provided for review. Dose modulation, iterative reconstruction, and/or weight based adjustment of the mA/kV was utilized to reduce the radiation dose to as low as reasonably achievable. COMPARISON: None. HISTORY: ORDERING SYSTEM PROVIDED HISTORY: lower abdominal pain TECHNOLOGIST PROVIDED HISTORY: Reason for exam:->lower abdominal pain Additional Contrast?->None Decision Support Exception->Emergency Medical Condition (MA) FINDINGS: There is a overall decompressed appearance of the colon. There is evidence for thickening of the bowel wall/bowel wall edema with some stranding of the pericolonic fat planes or increased vascularity in the pericolonic spaces particularly towards the distal transverse colon, splenic flexure of the colon and descending colon. These findings are compatible with near full pancolitis with relative sparing of the proximal and mid segments of the ascending colon, cecum and the region of the ileocecal valve and terminal ileum. Findings to be correlated clinically. There is no mesenteric adenopathy. The small bowel has unremarkable appearance.  There is no acute inflammatory hours  -In case of precipitous drop in H&H or evidence of overt bleed from upper GI tract consider upper endoscopy (caution as patient has nasal packing)     3. Nasal fracture status post closed reduction   -Per admitting/pertinent consultants      Continue antibiotics. Pending stool studies. Pending morning labs. Hgb stable and remains in normal range. Pending morning labs. Will follow.       Prudence URIEL Lopez CNP  4:34 AM  4/10/2021

## 2021-04-10 NOTE — PROGRESS NOTES
[facility] Hospitalist   Progress Note    Admitting Date and Time: 4/9/2021  9:49 AM  Admit Dx: Pancolitis ([address] [K51.00]    Subjective:    Pt feels slightly better today. States she continues to have loose bloody stools. Specimen was obtained this morning. Tolerating ABX well. Tolerating full liquid diet continues to have a dull pain to lower abdomen and left lower quadrant. No new concerns noted    Per RN: Stool specimen obtained. ROS: denies fever, chills, cp, sob, n/v, HA unless stated above.  fentanNYL  25 mcg Intravenous Once    Norethin-Eth Estradiol-Fe  1 tablet Oral Nightly    sodium chloride flush  5-40 mL Intravenous 2 times per day    ciprofloxacin  400 mg Intravenous Q12H    metroNIDAZOLE  500 mg Intravenous Q8H    predniSONE  40 mg Oral Daily    pantoprazole  40 mg Intravenous BID    And    sodium chloride (PF)  10 mL Intravenous BID     HYDROcodone-acetaminophen, 1 tablet, Q4H PRN  sodium chloride flush, 5-40 mL, PRN  sodium chloride, 25 mL, PRN  promethazine, 12.5 mg, Q6H PRN    Or  ondansetron, 4 mg, Q6H PRN  polyethylene glycol, 17 g, Daily PRN  acetaminophen, 650 mg, Q6H PRN    Or  acetaminophen, 650 mg, Q6H PRN         Objective:    /85   Pulse 79   Temp 97.9 °F (36.6 °C) (Oral)   Resp 16   Ht 5' 8\" (1.727 m)   Wt 174 lb (78.9 kg)   LMP 03/09/2021   SpO2 99%   BMI 26.46 kg/m²   General Appearance: alert and oriented to person, place and time and in no acute distress  Skin: warm and dry  Head: normocephalic and atraumatic  Eyes: pupils equal, round, and reactive to light, extraocular eye movements intact, conjunctivae normal  Neck: neck supple and non tender without mass   Pulmonary/Chest: clear to auscultation bilaterally- no wheezes, rales or rhonchi, normal air movement, no respiratory distress  Cardiovascular: normal rate, normal S1 and S2 and no rubs, gallops, murmur noted.     Abdomen: soft, mildly tender LLQ/lower abdomen, non-distended, normal bowel sounds, no rebound, guarding, rigidity noted. Extremities: no cyanosis, no clubbing and no edema  Neurologic: no cranial nerve deficit and speech normal    Recent Labs     04/09/21  1025 04/10/21  0645    138   K 4.3 3.9    105   CO2 26 25   BUN 9 9   CREATININE 1.0 0.8   GLUCOSE 99 101*   CALCIUM 9.1 8.8       Recent Labs     04/09/21  1025   ALKPHOS 57   PROT 6.9   LABALBU 3.9   BILITOT 0.6   AST 23   ALT 18       Recent Labs     04/09/21  1025 04/09/21  2210 04/10/21  0645   WBC 14.4*  --  11.7*   RBC 4.35  --  4.25   HGB 12.3 11.9 12.3   HCT 39.6 37.3 38.1   MCV 91.0  --  89.6   MCH 28.3  --  28.9   MCHC 31.1*  --  32.3   RDW 12.8  --  12.7     --  293   MPV 10.5  --  10.7           Radiology:   CT ABDOMEN PELVIS W IV CONTRAST Additional Contrast? None   Final Result   1. Findings compatible with near pancolitis, as above commented. 2.  Fluid accumulation in the right adnexa/a cul-de-sac could be relate with   recent rupture of follicular cyst.  Can correlate with pelvic ultrasound. Assessment:  Active Problems:    Pancolitis ([address]  Resolved Problems:    * No resolved hospital problems. *      Plan:  1. PanColitis Infectious vs.Inflammatory? No significant medical history. 4/9/2021 CT of abdomen compatible with near full pancolitis with relative sparing of the proximal and mid segments of the ascending colon, cecum and the region of the ileocecal valve and terminal ileum. Received ceftriaxone/Flagyl/Protonix/prednisoneIVF hydration in ED course. Continue prednisone/Protonix/Flagyl/Cipro. Stool cultures obtained and pending. .  CRP 2.1 ESR 8 . Full Liquid Diet. Non-emergent Colonoscopy as outpatient per GI. GI input appreciated. 2. Leukocytosis-likely 2/2 above. WBC 14.4>11.7. Received Flagyl/ceftriaxone in ED course COVID-19 negative. Stool cultures pending. Continue Flagyl/Cipro/IVF hydration follow closely. 3. Abdominal pain2/2 #1. Improving. Continue analgesics. Abx/steroids/PPI. Follow abdominal exam closely. 4. BRBPR-initially noted to be dark in color may be 2/2 ingested blood per GI. Several episodes of bright red blood without stool per patient per rectum. PPI to IV BID. Hgb 12.3. Follow H/H Q8. Remaining stable. EGD with precipitous drop in Hgb per GI. GI input appreciated. 1. Recent repair of nasal bone fracture-4/7/2021 close reduction nasal bone fracture/submucosa resection of inferior turbinates per Dr. Osbaldo Cottrell. Continue analgesics. NOTE: This report was transcribed using voice recognition software. Every effort was made to ensure accuracy; however, inadvertent computerized transcription errors may be present. Electronically signed by Manisha Lincoln CNP on 4/10/2021 at 9:07 AM

## 2021-04-11 VITALS
DIASTOLIC BLOOD PRESSURE: 87 MMHG | BODY MASS INDEX: 26.67 KG/M2 | RESPIRATION RATE: 16 BRPM | HEIGHT: 68 IN | HEART RATE: 77 BPM | WEIGHT: 176 LBS | TEMPERATURE: 97.7 F | SYSTOLIC BLOOD PRESSURE: 141 MMHG | OXYGEN SATURATION: 99 %

## 2021-04-11 LAB
BASOPHILS ABSOLUTE: 0.08 E9/L (ref 0–0.2)
BASOPHILS RELATIVE PERCENT: 0.6 % (ref 0–2)
C DIFF TOXIN/ANTIGEN: NORMAL
EOSINOPHILS ABSOLUTE: 0.13 E9/L (ref 0.05–0.5)
EOSINOPHILS RELATIVE PERCENT: 1 % (ref 0–6)
HCT VFR BLD CALC: 39.6 % (ref 34–48)
HCT VFR BLD CALC: 39.8 % (ref 34–48)
HEMOGLOBIN: 12.5 G/DL (ref 11.5–15.5)
HEMOGLOBIN: 12.7 G/DL (ref 11.5–15.5)
IMMATURE GRANULOCYTES #: 0.03 E9/L
IMMATURE GRANULOCYTES %: 0.2 % (ref 0–5)
LYMPHOCYTES ABSOLUTE: 3.28 E9/L (ref 1.5–4)
LYMPHOCYTES RELATIVE PERCENT: 26.2 % (ref 20–42)
MCH RBC QN AUTO: 29.3 PG (ref 26–35)
MCHC RBC AUTO-ENTMCNC: 31.9 % (ref 32–34.5)
MCV RBC AUTO: 91.9 FL (ref 80–99.9)
MONOCYTES ABSOLUTE: 0.92 E9/L (ref 0.1–0.95)
MONOCYTES RELATIVE PERCENT: 7.3 % (ref 2–12)
NEUTROPHILS ABSOLUTE: 8.08 E9/L (ref 1.8–7.3)
NEUTROPHILS RELATIVE PERCENT: 64.7 % (ref 43–80)
PDW BLD-RTO: 12.7 FL (ref 11.5–15)
PLATELET # BLD: 296 E9/L (ref 130–450)
PMV BLD AUTO: 11.2 FL (ref 7–12)
RBC # BLD: 4.33 E12/L (ref 3.5–5.5)
ROTAVIRUS ANTIGEN: NORMAL
WBC # BLD: 12.5 E9/L (ref 4.5–11.5)
WHITE BLOOD CELLS (WBC), STOOL: NORMAL

## 2021-04-11 PROCEDURE — 2500000003 HC RX 250 WO HCPCS: Performed by: NURSE PRACTITIONER

## 2021-04-11 PROCEDURE — 85025 COMPLETE CBC W/AUTO DIFF WBC: CPT

## 2021-04-11 PROCEDURE — C9113 INJ PANTOPRAZOLE SODIUM, VIA: HCPCS | Performed by: HOSPITALIST

## 2021-04-11 PROCEDURE — 6370000000 HC RX 637 (ALT 250 FOR IP): Performed by: NURSE PRACTITIONER

## 2021-04-11 PROCEDURE — 6360000002 HC RX W HCPCS: Performed by: HOSPITALIST

## 2021-04-11 PROCEDURE — 36415 COLL VENOUS BLD VENIPUNCTURE: CPT

## 2021-04-11 PROCEDURE — 85018 HEMOGLOBIN: CPT

## 2021-04-11 PROCEDURE — 6360000002 HC RX W HCPCS: Performed by: NURSE PRACTITIONER

## 2021-04-11 PROCEDURE — G0378 HOSPITAL OBSERVATION PER HR: HCPCS

## 2021-04-11 PROCEDURE — 2580000003 HC RX 258: Performed by: HOSPITALIST

## 2021-04-11 PROCEDURE — 85014 HEMATOCRIT: CPT

## 2021-04-11 PROCEDURE — 99239 HOSP IP/OBS DSCHRG MGMT >30: CPT | Performed by: INTERNAL MEDICINE

## 2021-04-11 RX ORDER — METRONIDAZOLE 500 MG/1
500 TABLET ORAL 3 TIMES DAILY
Qty: 27 TABLET | Refills: 0 | Status: SHIPPED | OUTPATIENT
Start: 2021-04-11 | End: 2021-04-20

## 2021-04-11 RX ORDER — CETIRIZINE HYDROCHLORIDE 10 MG/1
5 TABLET ORAL DAILY
Status: DISCONTINUED | OUTPATIENT
Start: 2021-04-11 | End: 2021-04-11 | Stop reason: HOSPADM

## 2021-04-11 RX ORDER — CIPROFLOXACIN 500 MG/1
500 TABLET, FILM COATED ORAL 2 TIMES DAILY
Qty: 18 TABLET | Refills: 0 | Status: SHIPPED | OUTPATIENT
Start: 2021-04-11 | End: 2021-04-20

## 2021-04-11 RX ADMIN — SODIUM CHLORIDE 10 ML: 9 INJECTION INTRAMUSCULAR; INTRAVENOUS; SUBCUTANEOUS at 08:28

## 2021-04-11 RX ADMIN — PREDNISONE 40 MG: 20 TABLET ORAL at 08:28

## 2021-04-11 RX ADMIN — CIPROFLOXACIN 400 MG: 2 INJECTION, SOLUTION INTRAVENOUS at 02:39

## 2021-04-11 RX ADMIN — PANTOPRAZOLE SODIUM 40 MG: 40 INJECTION, POWDER, FOR SOLUTION INTRAVENOUS at 08:28

## 2021-04-11 RX ADMIN — CETIRIZINE HYDROCHLORIDE 5 MG: 10 TABLET, FILM COATED ORAL at 08:28

## 2021-04-11 RX ADMIN — METRONIDAZOLE 500 MG: 500 INJECTION, SOLUTION INTRAVENOUS at 05:44

## 2021-04-11 ASSESSMENT — PAIN SCALES - GENERAL: PAINLEVEL_OUTOF10: 0

## 2021-04-11 NOTE — PROGRESS NOTES
the abdomen and pelvis was performed with the administration of intravenous contrast. Multiplanar reformatted images are provided for review. Dose modulation, iterative reconstruction, and/or weight based adjustment of the mA/kV was utilized to reduce the radiation dose to as low as reasonably achievable. COMPARISON: None. HISTORY: ORDERING SYSTEM PROVIDED HISTORY: lower abdominal pain TECHNOLOGIST PROVIDED HISTORY: Reason for exam:->lower abdominal pain Additional Contrast?->None Decision Support Exception->Emergency Medical Condition (MA) FINDINGS: There is a overall decompressed appearance of the colon. There is evidence for thickening of the bowel wall/bowel wall edema with some stranding of the pericolonic fat planes or increased vascularity in the pericolonic spaces particularly towards the distal transverse colon, splenic flexure of the colon and descending colon. These findings are compatible with near full pancolitis with relative sparing of the proximal and mid segments of the ascending colon, cecum and the region of the ileocecal valve and terminal ileum. Findings to be correlated clinically. There is no mesenteric adenopathy. The small bowel has unremarkable appearance. There is no acute inflammatory process in the omental mesenteric fat planes considering the stranding of the fat planes in the transverse mesentery a and the in the pericolonic spaces of the descending colon particular. There is no indication for bowel obstruction. In the pelvic cavity the bladder is empty. There are unremarkable appearance for the uterus and ovaries. Some fluid accumulation is seen around the right ovary and in the lower pelvic region. These findings could not be direct related with the near pancolitis, they can be relate with recent rupture of a follicular cyst of the right ovary. Can correlate with pelvic ultrasound. There are normal size and cortical enhancement for the kidneys.   There is no signs for obstruction. Adrenals are not enlarged. There are normal size enhancement for the liver, the spleen and pancreas. Gallbladder is normally distended. There is no dilatation of the biliary tree pancreatic ductal system. Lower lung bases demonstrate the no significant findings. Bone structures are of unremarkable appearance. 1.  Findings compatible with near pancolitis, as above commented. 2.  Fluid accumulation in the right adnexa/a cul-de-sac could be relate with recent rupture of follicular cyst.  Can correlate with pelvic ultrasound. Assessment and Plan  Patient is a 32 y.o. female on consult for pancolitis on CT. 1.  Pancolitis  -Differential diagnosis include infectious versus inflammatory or less likely ischemic  -Agree with empiric antibiotics  -Tolerated advanced diet  -Stool studies negative thus far  -Advance diet depending on hospital course and diagnostic studies  -Further evaluation with nonemergent colonoscopy which possibly can be performed as outpatient  -Would hold on steroids for now - defer to admitting     2.  GI bleed  -Lower gastrointestinal bleed likely related to above described pancolitis  -Cannot exclude upper source however black stool possibly secondary to ingested blood  -Increase PPI to twice a day IV dosing  -Monitor H&H initially every 8 hours  -In case of precipitous drop in H&H or evidence of overt bleed from upper GI tract consider upper endoscopy (caution as patient has nasal packing)     3. Nasal fracture status post closed reduction   -Per admitting/pertinent consultants      Hemoglobin has remained stable. Patient feeling much better. Diarrhea resolved. Okay for discharge from GI POV. Continue antibiotics. Prescriptions in chart. Would hold on steroids, but will defer to admitting. Patient to follow in our office in the next 1 to 2 weeks. Patient to call for appointment.  [phone].   Thank you for the opportunity to participate in the care of Ms.

## 2021-04-11 NOTE — DISCHARGE SUMMARY
with leukocytosis WBC 14.4. CT of abdomen reveals evidence for thickening of bowel wall/bowel wall edema with some stranding of the pericolonic fat planes or increased vascularity in the pericolonic spaces particularly towards the distal transverse colon, splenic flexure of the colon and descending colon. Compatible with near full pancolitis. Patient placed on empiric antibiotics, IVF hydration, and PPI. Stool culture obtained. GI seen throughout case. 1. PanColitis Infectious vs.Inflammatory? No significant medical history.  4/9/2021 CT of abdomen compatible with near full pancolitis with relative sparing of the proximal and mid segments of the ascending colon, cecum and the region of the ileocecal valve and terminal ileum.  Received ceftriaxone/Flagyl/Protonix/prednisoneIVF hydration in ED course.  Continue prednisone/Protonix/Flagyl/Cipro. Stool cultures obtained thus far negative.  CRP 2.1 ESR 8 .  Transition to soft diet tolerating well. Non-emergent Colonoscopy as outpatient per GI. GI input appreciated. 2. Leukocytosis-likely 2/2 above. WBC 14.4>11.7.  Received Flagyl/ceftriaxone in ED course COVID-19 negative.  Stool cultures pending.  Continue Flagyl/Cipro/IVF hydration follow closely. 3. Abdominal pain2/2 #1.      Resolved Continue analgesics.  Abx/steroids/PPI.  Follow abdominal exam closely. 4. BRBPR-initially noted to be dark in color may be 2/2 ingested blood per GI. Several episodes of bright red blood without stool per patient per rectum.  PPI to IV BID.  Hgb 12.3. H/H remaining stable.  EGD with precipitous drop in Hgb per GI. GI input appreciated. 1. Recent repair of nasal bone fracture-4/7/2021 close reduction nasal bone fracture/submucosa resection of inferior turbinates per Dr. Jevon Cronin analgesics. Patient remained hemodynamically stable and will be discharged at this time. Patient will be discharged on Cipro/Flagyl per GI.   Patient will be instructed to complete antibiotic regimen. Patient will also be instructed to follow-up with GI upon DC for probable colonoscopy. See hotline phone number given for PCP arrangement. Discharge Exam:  Vitals:    04/10/21 0830 04/10/21 1945 04/11/21 0029 04/11/21 0815   BP: 129/85 131/86  (!) 141/87   Pulse: 79 67  77   Resp: 16 16  16   Temp: 97.9 °F (36.6 °C) 98.2 °F (36.8 °C)  97.7 °F (36.5 °C)   TempSrc: Oral Oral  Oral   SpO2: 99% 99%  99%   Weight:   176 lb (79.8 kg)    Height:         General Appearance: alert and oriented to person, place and time and in no acute distress  Skin: warm and dry  Head: normocephalic and atraumatic  Eyes: pupils equal, round, and reactive to light, extraocular eye movements intact, conjunctivae normal  Neck: neck supple and non tender without mass   Pulmonary/Chest: clear to auscultation bilaterally- no wheezes, rales or rhonchi, normal air movement, no respiratory distress  Cardiovascular: normal rate, normal S1 and S2 and no rubs, gallops, murmur noted.    Abdomen: soft, mildly tender LLQ/lower abdomen, non-distended, normal bowel sounds, no rebound, guarding, rigidity noted. Extremities: no cyanosis, no clubbing and no edema  Neurologic: no cranial nerve deficit and speech normal    I/O last 3 completed shifts: In: 1560 [P.O.:480; I.V.:1080]  Out: 1000 [Urine:1000]  No intake/output data recorded.       LABS:  Recent Labs     04/09/21  1025 04/10/21  0645    138   K 4.3 3.9    105   CO2 26 25   BUN 9 9   CREATININE 1.0 0.8   GLUCOSE 99 101*   CALCIUM 9.1 8.8       Recent Labs     04/09/21  1025 04/09/21  1025 04/10/21  0645 04/10/21  1217 04/10/21  2005 04/11/21  0415   WBC 14.4*  --  11.7*  --   --  12.5*   RBC 4.35  --  4.25  --   --  4.33   HGB 12.3   < > 12.3 13.6 12.4 12.7  12.5   HCT 39.6   < > 38.1 42.6 39.7 39.8  39.6   MCV 91.0  --  89.6  --   --  91.9   MCH 28.3  --  28.9  --   --  29.3   MCHC 31.1*  --  32.3  --   --  31.9*   RDW 12.8  --  12.7  --   --  12.7   --  293  --   --  296   MPV 10.5  --  10.7  --   --  11.2    < > = values in this interval not displayed. Ref. Range 4/10/2021 07:15   CULTURE, STOOL Unknown Rpt   CLOSTRIDIUM DIFFICILE EIA Unknown Rpt   FECAL LEUKOCYTES Unknown Rpt   ROTAVIRUS ANTIGEN, STOOL Unknown Rpt   White Blood Cells (WBC), Stool Unknown PMN's were not seen   Rotavirus Unknown Rotavirus antigen. .. C.diff Toxin/Antigen Unknown C. Difficile Toxi. .. No results for input(s): POCGLU in the last 72 hours. Imaging:  Ct Abdomen Pelvis W Iv Contrast Additional Contrast? None    Result Date: 4/9/2021  EXAMINATION: CT OF THE ABDOMEN AND PELVIS WITH CONTRAST 4/9/2021 12:55 pm TECHNIQUE: CT of the abdomen and pelvis was performed with the administration of intravenous contrast. Multiplanar reformatted images are provided for review. Dose modulation, iterative reconstruction, and/or weight based adjustment of the mA/kV was utilized to reduce the radiation dose to as low as reasonably achievable. COMPARISON: None. HISTORY: ORDERING SYSTEM PROVIDED HISTORY: lower abdominal pain TECHNOLOGIST PROVIDED HISTORY: Reason for exam:->lower abdominal pain Additional Contrast?->None Decision Support Exception->Emergency Medical Condition (MA) FINDINGS: There is a overall decompressed appearance of the colon. There is evidence for thickening of the bowel wall/bowel wall edema with some stranding of the pericolonic fat planes or increased vascularity in the pericolonic spaces particularly towards the distal transverse colon, splenic flexure of the colon and descending colon. These findings are compatible with near full pancolitis with relative sparing of the proximal and mid segments of the ascending colon, cecum and the region of the ileocecal valve and terminal ileum. Findings to be correlated clinically. There is no mesenteric adenopathy. The small bowel has unremarkable appearance.  There is no acute inflammatory process in the omental mouth nightly   Refills: 3      EPINEPHrine (EPIPEN) 0.3 MG/0.3ML SOAJ injection USE 1 PEN AS NEEDED. MAY REPEAT EVERY 15-20 MINUTES  Refills: 2      Fexofenadine HCl (ALLEGRA PO) Take 1 tablet by mouth daily               Note that more than 30 minutes was spent in preparing discharge papers, discussing discharge with patient, medication review, etc.    NOTE: This report was transcribed using voice recognition software. Every effort was made to ensure accuracy; however, inadvertent computerized transcription errors may be present. Addendum: I have personally participated in the history, exam, medical decision making with the APRN on the date of service and I agree with all of the pertinent clinical information unless otherwise noted. I have also reviewed and agree with the past medical, family, and social history unless otherwise noted. PHYSICAL EXAM:  Vitals:   Vitals:    04/11/21 0815   BP: (!) 141/87   Pulse: 77   Resp: 16   Temp: 97.7 °F (36.5 °C)   SpO2: 99%      General Appearance: alert and oriented to person, place and time, well developed and well- nourished, in no acute distress  Skin: warm and dry, no rash or erythema  Head: normocephalic and atraumatic  Eyes: pupils equal, round, and reactive to light, extraocular eye movements intact, conjunctivae normal  ENT: Has nasal bandage on, nose appears otherwise normal  Pulmonary/Chest: clear to auscultation bilaterally- no wheezes, rales or rhonchi, normal air movement, no respiratory distress  Cardiovascular: normal rate, regular rhythm, normal S1 and S2, no murmurs, rubs, clicks, or gallops, distal pulses intact, no carotid bruits  Abdomen: soft, some right sided tenderness, but no guarding, benign abdomen  Extremities: no cyanosis, clubbing or edema    Impression:  Active Problems:    Pancolitis (HCC)  Resolved Problems:    * No resolved hospital problems. *      My findings/plan include:    Colitis with red blood.   Unknown infectious vs inflammatory. Rotavirus negative, c diff negative, fecal leukocytes negative, stool culture still pending. Will continue cipro and flagyl per GI, outpatient colonoscopy. Patient no longer stooling blood, tolerating diet, no fevers, ok to discharge. Electronically signed by Danis Soriano MD on 4/11/2021 at 12:16 PM       Signed:  Electronically signed by Stephanie Whitten.  Roshan Lincoln CNP on 4/11/2021 at 11:35 AM

## 2021-04-12 LAB — CULTURE, STOOL: NORMAL

## 2021-04-13 ENCOUNTER — OFFICE VISIT (OUTPATIENT)
Dept: ENT CLINIC | Age: 26
End: 2021-04-13
Payer: COMMERCIAL

## 2021-04-13 VITALS
SYSTOLIC BLOOD PRESSURE: 138 MMHG | DIASTOLIC BLOOD PRESSURE: 90 MMHG | HEART RATE: 85 BPM | BODY MASS INDEX: 25.85 KG/M2 | WEIGHT: 170 LBS

## 2021-04-13 DIAGNOSIS — S02.2XXD CLOSED FRACTURE OF NASAL SEPTUM WITH ROUTINE HEALING, SUBSEQUENT ENCOUNTER: ICD-10-CM

## 2021-04-13 DIAGNOSIS — S02.2XXD CLOSED FRACTURE OF NASAL BONE WITH ROUTINE HEALING, SUBSEQUENT ENCOUNTER: ICD-10-CM

## 2021-04-13 DIAGNOSIS — J34.89 NASAL OBSTRUCTION: ICD-10-CM

## 2021-04-13 DIAGNOSIS — S09.93XD FACIAL INJURY, SUBSEQUENT ENCOUNTER: ICD-10-CM

## 2021-04-13 LAB — CALPROTECTIN, FECAL: 1450 UG/G

## 2021-04-13 PROCEDURE — 31231 NASAL ENDOSCOPY DX: CPT | Performed by: OTOLARYNGOLOGY

## 2021-04-13 PROCEDURE — 99024 POSTOP FOLLOW-UP VISIT: CPT | Performed by: OTOLARYNGOLOGY

## 2021-04-27 ENCOUNTER — OFFICE VISIT (OUTPATIENT)
Dept: ENT CLINIC | Age: 26
End: 2021-04-27
Payer: COMMERCIAL

## 2021-04-27 VITALS — BODY MASS INDEX: 25.2 KG/M2 | WEIGHT: 166.3 LBS | HEIGHT: 68 IN

## 2021-04-27 DIAGNOSIS — J30.89 SEASONAL ALLERGIC RHINITIS DUE TO OTHER ALLERGIC TRIGGER: Primary | ICD-10-CM

## 2021-04-27 PROCEDURE — 1111F DSCHRG MED/CURRENT MED MERGE: CPT | Performed by: OTOLARYNGOLOGY

## 2021-04-27 PROCEDURE — 99213 OFFICE O/P EST LOW 20 MIN: CPT | Performed by: OTOLARYNGOLOGY

## 2021-04-27 PROCEDURE — 1036F TOBACCO NON-USER: CPT | Performed by: OTOLARYNGOLOGY

## 2021-04-27 PROCEDURE — G8419 CALC BMI OUT NRM PARAM NOF/U: HCPCS | Performed by: OTOLARYNGOLOGY

## 2021-04-27 PROCEDURE — G8427 DOCREV CUR MEDS BY ELIG CLIN: HCPCS | Performed by: OTOLARYNGOLOGY

## 2021-04-27 NOTE — PROGRESS NOTES
Dear Dr Espinoza primary care provider on file. No ref. provider found     We had the pleasure of seeing Ruth Ann, 1995 here    on 4/27/2021  Please see below for review of care and plans. Chief complaint- No diagnosis found. History of Present Illness- Was playing with dog and was hit in the face when she went to get the ball by dog's skull. Went to ER 20 days ago with no imaging and then here for eval Left side of nose feels blocked since event and also feels nose is off to the left side as the dog hit her right side. Has seasonal allergies that are worse in spring and uses meds but not fully controlled. + allergy testing with multiple antigens    4/13/21 sp surgery- pain still present. spllint still on . Nasal congestion     Review of Systems- No drainage, discharge, or headache. Complete 10 system ROS completed and negative except as noted above. Physical Examination-   Vital Signs-BP (!) 138/90   Pulse 85   Wt 170 lb (77.1 kg)   LMP 03/19/2021   BMI 25.85 kg/m²     Ears- Tympanic membranes clear bilaterally. No middle ear effusion. No pre or post auricular tenderness. Nose- Nasal mucosa clear and dry. +  septal deviation and b inferior turbinate hypertrophy. + left deviation of external bony nasal vault with stepoff palpated. deviaiton of septum is related to bony vault deviation- now with splint removed and bone nasal structure midline. Packing removed but some still in left superior vestibule  Oral Cavity/Oropharynx- Floor of mouth and tongue are soft and nontender. No posterior pharyngeal erythema. + gag reflex  Neck- Soft and nontender. No masses, lesions, lymphadenopathy, or thyroid nodules appreciated. Cranial Nerve- Cranial nerves II to XII intact. Extraocular muscles intact. No gross motor visual deficits. No spontaneous nystagmus. Face- + facial skin tenderness to palpation.   Heart- No cyanosis, regular   Lungs- No stridor, no intercostal accessory muscle use General- The patient is in no acute distress. A&O x3    Scope  Procedure note- after pt verbally consented, was sprayed nasally with 1:1 neosynephrine and xylocaine. Scope was passed and found nasal cavity with no lesion and packing still in place in left side. + crusting. Pt uanbale to ttolerater suctioning or crust removal. np clear, tonsil wnl, tongue mobile and no masses, vocal cords mobile bilaterally with full ab and ad duction. Hypopharynx clear, open and no masses. Medical Decision Making and Treatment Plan. Plan at this time for fracture management of nasal fracture was to   1 Review scans with pt- none  2. D/w pt options of treatment- conservative vs surgical   3. Recommend surgical intervention as + displaced fracture of nasal bone to the left with concomitant deviation of the septum   4. done  5. flonase for allergies  6. Nasal irrigation- continue  7. Use ketotifen eye drops for break throughallergy eye sxs. 8. Splint at night  9. Fu one week to eval packing. Thank you for the opportunity to take part in the care of this very pleasant patient, [place]  Sincerely,            [place] Yen.  Pam Coronel M.D., Ph.D., [facility]  Department of Otolaryngology-Head and Neck Surgery

## 2021-04-27 NOTE — PROGRESS NOTES
Dear Dr Espinoza primary care provider on file. No ref. provider found     We had the pleasure of seeing Ruth Ann, 1995 here    on 4/27/2021  Please see below for review of care and plans. Chief complaint- No diagnosis found. History of Present Illness- Was playing with dog and was hit in the face when she went to get the ball by dog's skull. Went to ER 20 days ago with no imaging and then here for eval Left side of nose feels blocked since event and also feels nose is off to the left side as the dog hit her right side. Has seasonal allergies that are worse in spring and uses meds but not fully controlled. + allergy testing with multiple antigens     4/07/2021: Closed reduction nasal bone fracture, submucosal resection of interior turbinates, reductions of septal fracture and dns    4/27/21 patient is feeling better, headaches have improved. Nose pain improved, not requiring any pain medications. Feels like her smell is improving. Still sneezing through her mouth as this is what she was told to do. Not using irrigtion    Review of Systems- No drainage, discharge, or headache. Complete 10 system ROS completed and negative except as noted above. Physical Examination-   Vital Signs-Ht 5' 8\" (1.727 m)   Wt 166 lb 4.8 oz (75.4 kg)   BMI 25.29 kg/m²     Ears- Tympanic membranes clear bilaterally. No middle ear effusion. No pre or post auricular tenderness. Nose- Nasal mucosa clear and dry. +  septal deviation and b inferior turbinate hypertrophy. + left deviation of external bony nasal vault with stepoff palpated. deviaiton of septum is related to bony vault deviation. Oral Cavity/Oropharynx- Floor of mouth and tongue are soft and nontender. No posterior pharyngeal erythema. + gag reflex  Neck- Soft and nontender. No masses, lesions, lymphadenopathy, or thyroid nodules appreciated. Cranial Nerve- Cranial nerves II to XII intact. Extraocular muscles intact.   No gross motor visual deficits. No spontaneous nystagmus. Face- No facial skin tenderness to palpation. Heart- No cyanosis, regular  Lungs- No stridor, no intercostal accessory muscle use  General- The patient is in no acute distress. A&O x3    Medical Decision Making and Treatment Plan. Plan at this time for fracture management of nasal fracture was to   1 Review scans with pt- none  2. D/w pt options of treatment- conservative vs surgical   3. Recommend surgical intervention as + displaced fracture of nasal bone to the left with concomitant deviation of the septum   4. Done  5. Ketotifen for allergies  6. Nasal irrigation  7. Fu 3 mo toeval irrigation and drops on allergies and address as needed. Thank you for the opportunity to take part in the care of this very pleasant patient, [place]  Sincerely,            [place] Close.  Tj Virk M.D., Ph.D., [facility]  Department of Otolaryngology-Head and Neck Surgery

## 2021-07-27 ENCOUNTER — OFFICE VISIT (OUTPATIENT)
Dept: ENT CLINIC | Age: 26
End: 2021-07-27
Payer: COMMERCIAL

## 2021-07-27 VITALS
BODY MASS INDEX: 24.04 KG/M2 | WEIGHT: 158.6 LBS | HEART RATE: 86 BPM | RESPIRATION RATE: 14 BRPM | OXYGEN SATURATION: 98 % | SYSTOLIC BLOOD PRESSURE: 127 MMHG | DIASTOLIC BLOOD PRESSURE: 82 MMHG | HEIGHT: 68 IN

## 2021-07-27 DIAGNOSIS — J34.89 NASAL PAIN: Primary | ICD-10-CM

## 2021-07-27 DIAGNOSIS — J34.89 NASAL OBSTRUCTION: ICD-10-CM

## 2021-07-27 DIAGNOSIS — J34.2 DNS (DEVIATED NASAL SEPTUM): ICD-10-CM

## 2021-07-27 PROCEDURE — 99213 OFFICE O/P EST LOW 20 MIN: CPT | Performed by: OTOLARYNGOLOGY

## 2021-07-27 PROCEDURE — 1036F TOBACCO NON-USER: CPT | Performed by: OTOLARYNGOLOGY

## 2021-07-27 PROCEDURE — G8420 CALC BMI NORM PARAMETERS: HCPCS | Performed by: OTOLARYNGOLOGY

## 2021-07-27 PROCEDURE — G8427 DOCREV CUR MEDS BY ELIG CLIN: HCPCS | Performed by: OTOLARYNGOLOGY

## 2021-09-13 NOTE — PROGRESS NOTES
Dear Dr Espinoza primary care provider on file. No ref. provider found     We had the pleasure of seeing Ruth Ann, 1995 here    on 9/13/2021  Please see below for review of care and plans. Chief complaint- No diagnosis found. History of Present Illness- Was playing with dog and was hit in the face when she went to get the ball by dog's skull. Went to ER 20 days ago with no imaging and then here for eval Left side of nose feels blocked since event and also feels nose is off to the left side as the dog hit her right side. Has seasonal allergies that are worse in spring and uses meds but not fully controlled. + allergy testing with multiple antigens     4/07/2021: Closed reduction nasal bone fracture, submucosal resection of interior turbinates, reductions of septal fracture and dns    4/13/21 s/p surgery, pain present, nasal congestion    4/27/21 patient is feeling better, headaches have improved. Nose pain improved, not requiring any pain medications. Feels like her smell is improving. Still sneezing through her mouth as this is what she was told to do. Not using irrigation    7/27/21 pt doing well, using meds and nasal pain improved  Breathing much better     Review of Systems- No drainage, discharge, or headache. Complete 10 system ROS completed and negative except as noted above. Physical Examination-   Vital Signs-/82 (Site: Left Upper Arm, Position: Sitting, Cuff Size: Medium Adult)   Pulse 86   Resp 14   Ht 5' 8\" (1.727 m)   Wt 158 lb 9.6 oz (71.9 kg)   SpO2 98%   BMI 24.12 kg/m²     Ears- Tympanic membranes clear bilaterally. No middle ear effusion. No pre or post auricular tenderness. Nose- Nasal mucosa clear and dry. +  septal deviation and b inferior turbinate hypertrophy. + left deviation of external bony nasal vault with stepoff palpated.  deviaiton of septum is related to bony vault deviation now straighter with minimal obs  Oral Cavity/Oropharynx- Floor of mouth and tongue are soft and nontender. No posterior pharyngeal erythema. + gag reflex  Neck- Soft and nontender. No masses, lesions, lymphadenopathy, or thyroid nodules appreciated. Cranial Nerve- Cranial nerves II to XII intact. Extraocular muscles intact. No gross motor visual deficits. No spontaneous nystagmus. Face- No facial skin tenderness to palpation. Heart- No cyanosis, regular  Lungs- No stridor, no intercostal accessory muscle use  General- The patient is in no acute distress. A&O x3    Medical Decision Making and Treatment Plan. Plan at this time for fracture management of nasal fracture was to   1 Review scans with pt- none  2. D/w pt options of treatment- conservative vs surgical   3. Recommend surgical intervention as + displaced fracture of nasal bone to the left with concomitant deviation of the septum   - Done  5. Ketotifen for allergies  6. Nasal irrigation  7. Fu prn    Thank you for the opportunity to take part in the care of this very pleasant patient, [place]  Sincerely,            Sherly Holt.  Keny Mederos M.D., Ph.D., [facility]  Department of Otolaryngology-Head and Neck Surgery

## 2023-09-07 ENCOUNTER — TELEPHONE (OUTPATIENT)
Dept: ADMINISTRATIVE | Age: 28
End: 2023-09-07

## 2023-09-07 NOTE — TELEPHONE ENCOUNTER
Please advise scheduling patient for new OB visit. LMP 08/5/23, Positive 08/30/23. Spoke to [place] office and they do not have any opening. Patient will to go to [facility] office.  Unable to reach office via phone do to patient care

## 2023-09-12 NOTE — TELEPHONE ENCOUNTER
Pt called to check status of scheduling an appt. Staff unavailable due to pt care. Pt prefers to see Dr. Jayda Thapa but is willing to see anyone. Please contact pt.

## 2023-10-20 ENCOUNTER — INITIAL PRENATAL (OUTPATIENT)
Dept: OBGYN | Age: 28
End: 2023-10-20

## 2023-10-20 ENCOUNTER — HOSPITAL ENCOUNTER (OUTPATIENT)
Age: 28
Discharge: HOME | End: 2023-10-20
Payer: COMMERCIAL

## 2023-10-20 VITALS
HEART RATE: 84 BPM | WEIGHT: 170 LBS | BODY MASS INDEX: 25.85 KG/M2 | DIASTOLIC BLOOD PRESSURE: 74 MMHG | SYSTOLIC BLOOD PRESSURE: 124 MMHG

## 2023-10-20 DIAGNOSIS — Z34.91 ENCOUNTER FOR SUPERVISION OF NORMAL PREGNANCY IN FIRST TRIMESTER, UNSPECIFIED GRAVIDITY: Primary | ICD-10-CM

## 2023-10-20 DIAGNOSIS — Z34.81 ENCOUNTER FOR SUPERVISION OF OTHER NORMAL PREGNANCY, FIRST TRIMESTER: ICD-10-CM

## 2023-10-20 LAB
ABO + RH BLD: NORMAL
AMPHETAMINE SCREEN URINE: NEGATIVE
BARBITURATE SCREEN URINE: NEGATIVE
BENZODIAZEPINE SCREEN, URINE: NEGATIVE
BLOOD BANK SAMPLE EXPIRATION: NORMAL
BLOOD GROUP ANTIBODIES SERPL: NEGATIVE
BUPRENORPHINE URINE: NEGATIVE
CANNABINOID SCREEN URINE: NEGATIVE
COCAINE METABOLITE, URINE: NEGATIVE
CONTROL: NORMAL
ERYTHROCYTE [DISTWIDTH] IN BLOOD BY AUTOMATED COUNT: 12.1 % (ref 11.5–15)
FENTANYL URINE: NEGATIVE
GLUCOSE URINE, POC: NEGATIVE
HBV SURFACE AG SERPL QL IA: NONREACTIVE
HCT VFR BLD AUTO: 40.8 % (ref 34–48)
HCV AB SERPL QL IA: NONREACTIVE
HGB BLD-MCNC: 13.5 G/DL (ref 11.5–15.5)
HIV 1+2 AB+HIV1 P24 AG SERPL QL IA: NONREACTIVE
IRON SATN MFR SERPL: 34 % (ref 15–50)
IRON SERPL-MCNC: 121 UG/DL (ref 37–145)
MCH RBC QN AUTO: 29.2 PG (ref 26–35)
MCHC RBC AUTO-ENTMCNC: 33.1 G/DL (ref 32–34.5)
MCV RBC AUTO: 88.3 FL (ref 80–99.9)
METHADONE SCREEN, URINE: NEGATIVE
OPIATES, URINE: NEGATIVE
OXYCODONE SCREEN URINE: NEGATIVE
PHENCYCLIDINE, URINE: NEGATIVE
PLATELET # BLD AUTO: 364 K/UL (ref 130–450)
PMV BLD AUTO: 9.4 FL (ref 7–12)
PREGNANCY TEST URINE, POC: POSITIVE
PROTEIN UA: NEGATIVE
RBC # BLD AUTO: 4.62 M/UL (ref 3.5–5.5)
TEST INFORMATION: NORMAL
TIBC SERPL-MCNC: 358 UG/DL (ref 250–450)
WBC OTHER # BLD: 10.5 K/UL (ref 4.5–11.5)

## 2023-10-20 PROCEDURE — 86803 HEPATITIS C AB TEST: CPT

## 2023-10-20 PROCEDURE — 83550 IRON BINDING TEST: CPT

## 2023-10-20 PROCEDURE — 85027 COMPLETE CBC AUTOMATED: CPT

## 2023-10-20 PROCEDURE — 83540 ASSAY OF IRON: CPT

## 2023-10-20 PROCEDURE — 86592 SYPHILIS TEST NON-TREP QUAL: CPT

## 2023-10-20 PROCEDURE — 81220 CFTR GENE COM VARIANTS: CPT

## 2023-10-20 PROCEDURE — 81329 SMN1 GENE DOS/DELETION ALYS: CPT

## 2023-10-20 PROCEDURE — 36415 COLL VENOUS BLD VENIPUNCTURE: CPT

## 2023-10-20 PROCEDURE — 87389 HIV-1 AG W/HIV-1&-2 AB AG IA: CPT

## 2023-10-20 PROCEDURE — 85660 RBC SICKLE CELL TEST: CPT

## 2023-10-20 PROCEDURE — 86901 BLOOD TYPING SEROLOGIC RH(D): CPT

## 2023-10-20 PROCEDURE — 86787 VARICELLA-ZOSTER ANTIBODY: CPT

## 2023-10-20 PROCEDURE — 86900 BLOOD TYPING SEROLOGIC ABO: CPT

## 2023-10-20 PROCEDURE — 86850 RBC ANTIBODY SCREEN: CPT

## 2023-10-20 PROCEDURE — 87340 HEPATITIS B SURFACE AG IA: CPT

## 2023-10-20 SDOH — ECONOMIC STABILITY: FOOD INSECURITY: WITHIN THE PAST 12 MONTHS, THE FOOD YOU BOUGHT JUST DIDN'T LAST AND YOU DIDN'T HAVE MONEY TO GET MORE.: NEVER TRUE

## 2023-10-20 SDOH — ECONOMIC STABILITY: HOUSING INSECURITY
IN THE LAST 12 MONTHS, WAS THERE A TIME WHEN YOU DID NOT HAVE A STEADY PLACE TO SLEEP OR SLEPT IN A SHELTER (INCLUDING NOW)?: NO

## 2023-10-20 SDOH — ECONOMIC STABILITY: FOOD INSECURITY: WITHIN THE PAST 12 MONTHS, YOU WORRIED THAT YOUR FOOD WOULD RUN OUT BEFORE YOU GOT MONEY TO BUY MORE.: NEVER TRUE

## 2023-10-20 SDOH — ECONOMIC STABILITY: INCOME INSECURITY: HOW HARD IS IT FOR YOU TO PAY FOR THE VERY BASICS LIKE FOOD, HOUSING, MEDICAL CARE, AND HEATING?: NOT HARD AT ALL

## 2023-10-20 NOTE — PROGRESS NOTES
Subjective:      Jigna is being seen today for her first obstetrical visit. Here with FOB   at [address] [place] [address] by LMP. Endorses nausea. Taking prenatal vitamins  Intermittent pre-syncopal episodes. Can occur with activity and rest. Hx of vasovagal syncope. Thought she may carole anemic and started OTC iron supplement. Hx of migraines improved with Tylenol  Pre- syncopal episodes. Started iron OTC. Hx of migraines, tried Tylenol for HA  Last pap 2018 and normal. Denies hx of abnormal  paps  Objective:        Vitals:    10/20/23 1102   BP: 124/74   Pulse: 84      General Appearance:    Alert, cooperative, no distress, appears stated age  Head:    Normocephalic, without obvious abnormality, atraumatic  Lungs:     Respirations unlabored  Heart:    Regular rate and rhythm  Breast Exam:  Deferred  Abdomen:     Soft, non-tender, no masses, no organomegaly  Pelvic Exam Deferred       Assessment:      Pregnancy at 10 and 6/7 weeks   Pre-syncope  Hx of migraines     Plan:      Initial labs ordered  Check iron studies  Panorama  Prenatal vitamins.   Declines pap today   Referred to [facility] for [facility]  Follow-up in 4 weeks

## 2023-10-20 NOTE — PROGRESS NOTES
Introduced patient to [facility]. Patient was unsure at this time due to work schedule. Will follow-up at next appt.

## 2023-10-20 NOTE — PROGRESS NOTES
Patient alert and pleasant with no complaints. Here today for initial prenatal visit. Fetal heart tones obtained without difficulty. Urine for glucose and protein obtained with negative results. Clean catch urine obtained for UDS, Cultures and GCC, labeled and sent to the lab. Panorama ordered, kit given to patient with prenatal lab orders and instructions. Discharge instructions have been discussed with the patient. Patient advised to call our office with any questions or concerns. Voiced understanding.

## 2023-10-21 LAB — SICKLE CELL SCREEN: NEGATIVE

## 2023-10-22 LAB
CULTURE: ABNORMAL
SPECIMEN DESCRIPTION: ABNORMAL

## 2023-10-23 LAB
CULTURE: ABNORMAL
CULTURE: ABNORMAL
RPR SER QL: NONREACTIVE
SPECIMEN DESCRIPTION: ABNORMAL

## 2023-10-24 LAB
C. TRACHOMATIS DNA ,URINE: NEGATIVE
N. GONORRHOEAE DNA, URINE: NEGATIVE
VZV IGG SER IA-ACNC: 1885 IV
VZV IGM SER IA-ACNC: 0 ISR

## 2023-10-27 LAB
GEN STRUCT VAR COPY NUM: NORMAL
SMN1 GENE MUT ANL BLD/T: NORMAL
SMN1+SMN2 GENE MUT ANL BLD/T: NORMAL
SMN2 GENE MUT ANL BLD/T: NORMAL
SPECIMEN SOURCE: NORMAL
SYMPTOM: NORMAL

## 2023-10-28 LAB
CFTR ALLELE 1 BLD/T QL: NEGATIVE
CFTR ALLELE 2 BLD/T QL: NEGATIVE
CFTR MUT ANL BLD/T: NORMAL
CFTR MUT ANL BLD/T: NORMAL

## 2023-11-09 ENCOUNTER — ANCILLARY PROCEDURE (OUTPATIENT)
Dept: OBGYN CLINIC | Age: 28
End: 2023-11-09
Payer: COMMERCIAL

## 2023-11-09 ENCOUNTER — INITIAL PRENATAL (OUTPATIENT)
Dept: OBGYN CLINIC | Age: 28
End: 2023-11-09
Payer: COMMERCIAL

## 2023-11-09 VITALS
SYSTOLIC BLOOD PRESSURE: 122 MMHG | WEIGHT: 169 LBS | HEART RATE: 106 BPM | BODY MASS INDEX: 25.7 KG/M2 | DIASTOLIC BLOOD PRESSURE: 84 MMHG

## 2023-11-09 DIAGNOSIS — Z87.898 HISTORY OF SYNCOPE: ICD-10-CM

## 2023-11-09 DIAGNOSIS — Z36.9 FIRST TRIMESTER SCREENING: ICD-10-CM

## 2023-11-09 DIAGNOSIS — Z31.5 ENCOUNTER FOR PROCREATIVE GENETIC COUNSELING: ICD-10-CM

## 2023-11-09 DIAGNOSIS — N83.201 CYST OF RIGHT OVARY: ICD-10-CM

## 2023-11-09 DIAGNOSIS — Z3A.13 13 WEEKS GESTATION OF PREGNANCY: Primary | ICD-10-CM

## 2023-11-09 DIAGNOSIS — D25.1 INTRAMURAL LEIOMYOMA OF UTERUS: ICD-10-CM

## 2023-11-09 LAB
GLUCOSE URINE, POC: NEGATIVE
PROTEIN UA: NEGATIVE

## 2023-11-09 PROCEDURE — G8427 DOCREV CUR MEDS BY ELIG CLIN: HCPCS | Performed by: OBSTETRICS & GYNECOLOGY

## 2023-11-09 PROCEDURE — 81002 URINALYSIS NONAUTO W/O SCOPE: CPT | Performed by: OBSTETRICS & GYNECOLOGY

## 2023-11-09 PROCEDURE — G8419 CALC BMI OUT NRM PARAM NOF/U: HCPCS | Performed by: OBSTETRICS & GYNECOLOGY

## 2023-11-09 PROCEDURE — 76801 OB US < 14 WKS SINGLE FETUS: CPT | Performed by: OBSTETRICS & GYNECOLOGY

## 2023-11-09 PROCEDURE — 99203 OFFICE O/P NEW LOW 30 MIN: CPT | Performed by: OBSTETRICS & GYNECOLOGY

## 2023-11-09 PROCEDURE — 99243 OFF/OP CNSLTJ NEW/EST LOW 30: CPT | Performed by: OBSTETRICS & GYNECOLOGY

## 2023-11-09 PROCEDURE — G8484 FLU IMMUNIZE NO ADMIN: HCPCS | Performed by: OBSTETRICS & GYNECOLOGY

## 2023-11-09 PROCEDURE — 76813 OB US NUCHAL MEAS 1 GEST: CPT | Performed by: OBSTETRICS & GYNECOLOGY

## 2023-11-09 NOTE — PROGRESS NOTES
Pt being seen as a new patient. Pt denies any bleeding,cramping,contractions or fluid leakage. Pt has no complaints today.

## 2023-11-09 NOTE — PROGRESS NOTES
2023      Anny Mark, [address] [facility],[address]  [address] [facility],  [address] [facility]     RE:  Thalia Keane  : 1995   AGE: 29 y.o. This report has been created using voice recognition software. It may contain errors which are inherent in voice recognition technology. Dear Dr. Laya Powell: I had the pleasure of meeting with Ms. Milvia Toure for a consultation. As you know, Ms. Milvia Toure is a 29 y.o.  at 13w5d (LMP = 15 [facility]) who was referred to our office for counseling secondary to genetic screening. The patient's medical records and laboratory workup were reviewed. The patient's history was reviewed and outlined below. Today, Ms. Milvia Toure reports that she feels well. She denies any symptoms of leaking of fluid, vaginal bleeding, and/or contractions. She had a fetal ultrasound that was notable for the following. There is a single intrauterine gestation in a variable presentation with a heart rate of 155 beats per minute. The placenta is anterior. The crown rump length is consistent with 14w0d. The nuchal translucency is normal at 1.8 mm. A fibroid is seen in the posterior wall of the uterus, proximal to the cervix, measuring 3.7 x 3.8 x 4.3 cm. A right ovarian cyst is seen measuring 3.3 x 1.6 x 1.9 cm. PAST OBSTETRICAL HISTORY:  None    PAST GYNECOLOGICAL  HISTORY:  Negative for abnormal pap smears. Negative for sexually transmitted diseases. Negative for cervical LEEP / conization /cryosurgery. Negative for uterine surgery. Negative for ovarian or tubal surgery.      PAST MEDICAL HISTORY:    MEDICATIONS:  Prenatal vitamin    ILLNESSES:  None    BLOOD TRANSFUSIONS:  None    IMMUNIZATIONS:  Up-to-date, no flu vaccine, no COVID vaccine    SURGERIES:  Sinus surgery/turbinate resection, wisdom tooth extraction  She denies issues with anesthesia    HOSPITALIZATIONS:  Surgery    ALLERGIES:  NKDA, she denies any latex or shellfish allergy    SOCIAL HISTORY:  She denies tobacco,

## 2023-11-10 ENCOUNTER — TELEPHONE (OUTPATIENT)
Dept: OBGYN CLINIC | Age: 28
End: 2023-11-10

## 2023-11-10 NOTE — TELEPHONE ENCOUNTER
[facility] asking [place] to call [facility] to schedule a 6 wk visit with Dr. Elle Muhammad, around mid December.

## 2023-11-15 ENCOUNTER — ROUTINE PRENATAL (OUTPATIENT)
Dept: OBGYN | Age: 28
End: 2023-11-15
Payer: COMMERCIAL

## 2023-11-15 ENCOUNTER — TELEPHONE (OUTPATIENT)
Dept: OBGYN | Age: 28
End: 2023-11-15

## 2023-11-15 VITALS
DIASTOLIC BLOOD PRESSURE: 57 MMHG | WEIGHT: 169 LBS | HEART RATE: 95 BPM | SYSTOLIC BLOOD PRESSURE: 114 MMHG | BODY MASS INDEX: 25.7 KG/M2

## 2023-11-15 DIAGNOSIS — Z3A.14 14 WEEKS GESTATION OF PREGNANCY: ICD-10-CM

## 2023-11-15 DIAGNOSIS — R30.0 DYSURIA: ICD-10-CM

## 2023-11-15 DIAGNOSIS — Z34.02 ENCOUNTER FOR SUPERVISION OF NORMAL FIRST PREGNANCY IN SECOND TRIMESTER: Primary | ICD-10-CM

## 2023-11-15 LAB
GLUCOSE URINE, POC: NEGATIVE
PROTEIN UA: NEGATIVE

## 2023-11-15 PROCEDURE — 99213 OFFICE O/P EST LOW 20 MIN: CPT | Performed by: OBSTETRICS & GYNECOLOGY

## 2023-11-15 PROCEDURE — 81002 URINALYSIS NONAUTO W/O SCOPE: CPT | Performed by: OBSTETRICS & GYNECOLOGY

## 2023-11-15 RX ORDER — FERROUS SULFATE 325(65) MG
325 TABLET ORAL
COMMUNITY

## 2023-11-15 NOTE — PROGRESS NOTES
Introduced patient to [facility] program. Patient voiced she is not interested at this time.

## 2023-11-15 NOTE — TELEPHONE ENCOUNTER
Called patient to introduced [facility] Pregnancy program. Left voicemail for patient.  Will follow-up regarding interest in program.

## 2023-11-15 NOTE — PROGRESS NOTES
SUBJECTIVE:   29 y.o.  female here for routine OB appointment. Pt has history of vasovagal syncope prior to pregnancy. Pt had thorough workup with cardio and neuro at that time. Pt has not passed out. States she has been taking iron OTC. Pt states she gets dizzy but is able to rest and it resolves. Drinking fluids and eating salt. Prenatal labs reviewed. Panorama low risk. Had some bacteria on urine culture. No symptoms, will repeat. Pt c/o restless leg syndrome. Has tried magnesium, walking without relief. Pt is going to try benadryl. Urine culture, afp, rubella ordered. F/u 4 wks.      OB History    Para Term  AB Living   1 0       0   SAB IAB Ectopic Molar Multiple Live Births                    # Outcome Date GA Lbr Terry/2nd Weight Sex Delivery Anes PTL Lv   1 Current                Past Medical History:   Diagnosis Date    Colitis 2021    enlarged colon     Migraine 2017    Less frequent, only occasional, usually stress related    Migraine headache 10/31/2018    Vasovagal syncopes         Past Surgical History:   Procedure Laterality Date    SINUS SURGERY      Polyps removed not certain site ie right, left or bilateral    TURBINATE RESECTION N/A 2021    CLOSED REDUCTION NASAL BONE FRACTURE, SUBMUCOUSAL RESECTION OF INFERIOR TURBINATES performed by Migel Davenport MD at [address] [place] [address]          Family History   Problem Relation Age of Onset    Preeclampsia Mother         I was 3 weeks early because of this    No Known Problems Father     Breast Cancer Maternal Grandmother     Diabetes Paternal Grandmother         Social History       Tobacco History       Smoking Status  Never      Smokeless Tobacco Use  Never              Alcohol History       Alcohol Use Status  Not Currently Drinks/Week  0 Glasses of wine, 0 Cans of beer, 0 Shots of liquor, 0 Drinks containing 0.5 oz of alcohol per week Comment  couple times a month              Drug Use       Drug Use

## 2023-11-15 NOTE — PROGRESS NOTES
Patient alert and pleasant with no complaints. Here today with her mother for prenatal visit. Fetal heart tones obtained without difficulty. Urine for glucose and protein obtained with negative results. Discharge instructions have been discussed with the patient. Patient advised to call our office with any questions or concerns. Voiced understanding.

## 2023-11-17 PROBLEM — Z31.5 ENCOUNTER FOR PROCREATIVE GENETIC COUNSELING: Status: ACTIVE | Noted: 2023-11-17

## 2023-11-17 PROBLEM — D25.1 INTRAMURAL LEIOMYOMA OF UTERUS: Status: ACTIVE | Noted: 2023-11-17

## 2023-11-17 PROBLEM — N83.201 CYST OF RIGHT OVARY: Status: ACTIVE | Noted: 2023-11-17

## 2023-11-17 PROBLEM — Z87.898 HISTORY OF SYNCOPE: Status: ACTIVE | Noted: 2023-11-17

## 2023-11-22 ENCOUNTER — HOSPITAL ENCOUNTER (OUTPATIENT)
Age: 28
Discharge: HOME | End: 2023-11-22
Payer: COMMERCIAL

## 2023-11-22 DIAGNOSIS — R30.0 DYSURIA: ICD-10-CM

## 2023-11-22 DIAGNOSIS — Z3A.14 14 WEEKS GESTATION OF PREGNANCY: ICD-10-CM

## 2023-11-22 DIAGNOSIS — Z34.02 ENCOUNTER FOR SUPERVISION OF NORMAL FIRST PREGNANCY IN SECOND TRIMESTER: ICD-10-CM

## 2023-11-22 LAB
BILIRUB UR QL STRIP: NEGATIVE
CLARITY UR: CLEAR
COLOR UR: YELLOW
COMMENT: NORMAL
GLUCOSE UR STRIP-MCNC: NEGATIVE MG/DL
HGB UR QL STRIP.AUTO: NEGATIVE
KETONES UR STRIP-MCNC: NEGATIVE MG/DL
LEUKOCYTE ESTERASE UR QL STRIP: NEGATIVE
NITRITE UR QL STRIP: NEGATIVE
PH UR STRIP: 8 [PH] (ref 5–9)
PROT UR STRIP-MCNC: NEGATIVE MG/DL
SP GR UR STRIP: 1.02 (ref 1–1.03)
UROBILINOGEN UR STRIP-ACNC: 0.2 EU/DL (ref 0–1)

## 2023-11-22 PROCEDURE — 36415 COLL VENOUS BLD VENIPUNCTURE: CPT

## 2023-11-22 PROCEDURE — 86762 RUBELLA ANTIBODY: CPT

## 2023-11-22 PROCEDURE — 82105 ALPHA-FETOPROTEIN SERUM: CPT

## 2023-11-22 PROCEDURE — 87086 URINE CULTURE/COLONY COUNT: CPT

## 2023-11-22 PROCEDURE — 81003 URINALYSIS AUTO W/O SCOPE: CPT

## 2023-11-23 LAB
MICROORGANISM SPEC CULT: ABNORMAL
SPECIMEN DESCRIPTION: ABNORMAL

## 2023-11-26 LAB
AFP INTERPRETATION: NORMAL
AFP MOM: 0.64
AFP SPECIMEN: NORMAL
AFP: 19 NG/ML
DATE OF BIRTH: NORMAL
DATING METHOD: NORMAL
DETERMINED BY: NORMAL
DIABETIC: NORMAL
DONOR EGG?: NORMAL
DUE DATE: NORMAL
ESTIMATED DUE DATE: NORMAL
FAMILY HISTORY NTD: NORMAL
GESTATIONAL AGE: NORMAL
IN VITRO FERTILIZATION: NORMAL
INSULIN REQ DIABETES: NO
LAST MENSTRUAL PERIOD: NORMAL
MATERNAL AGE AT EDD: 29.3 YR
MATERNAL WEIGHT: NORMAL
MONOCHORIONIC TWINS: NORMAL
NUMBER OF FETUSES: NORMAL
PATIENT WEIGHT UNITS: NORMAL
PATIENT WEIGHT: NORMAL
RACE (MATERNAL): NORMAL
RACE: NORMAL
REPEAT SPECIMEN?: NORMAL
SMOKING: NO
SMOKING: NORMAL
VALPROIC/CARBAMAZEP: NORMAL
ZZ NTE CLEAN UP: HISTORY: NO

## 2023-11-27 LAB — RUBV IGG SERPL QL IA: NORMAL IU/ML

## 2023-12-13 ENCOUNTER — ROUTINE PRENATAL (OUTPATIENT)
Dept: OBGYN | Age: 28
End: 2023-12-13

## 2023-12-13 VITALS
BODY MASS INDEX: 26.3 KG/M2 | HEART RATE: 94 BPM | SYSTOLIC BLOOD PRESSURE: 129 MMHG | DIASTOLIC BLOOD PRESSURE: 69 MMHG | WEIGHT: 173 LBS

## 2023-12-13 DIAGNOSIS — Z34.02 ENCOUNTER FOR SUPERVISION OF NORMAL FIRST PREGNANCY IN SECOND TRIMESTER: Primary | ICD-10-CM

## 2023-12-13 DIAGNOSIS — Z3A.18 18 WEEKS GESTATION OF PREGNANCY: ICD-10-CM

## 2023-12-21 ENCOUNTER — ROUTINE PRENATAL (OUTPATIENT)
Dept: OBGYN CLINIC | Age: 28
End: 2023-12-21
Payer: COMMERCIAL

## 2023-12-21 ENCOUNTER — ANCILLARY PROCEDURE (OUTPATIENT)
Dept: OBGYN CLINIC | Age: 28
End: 2023-12-21
Payer: COMMERCIAL

## 2023-12-21 VITALS
DIASTOLIC BLOOD PRESSURE: 75 MMHG | SYSTOLIC BLOOD PRESSURE: 120 MMHG | BODY MASS INDEX: 27.04 KG/M2 | HEART RATE: 89 BPM | WEIGHT: 178.4 LBS | HEIGHT: 68 IN

## 2023-12-21 DIAGNOSIS — Z3A.19 19 WEEKS GESTATION OF PREGNANCY: Primary | ICD-10-CM

## 2023-12-21 DIAGNOSIS — D25.1 INTRAMURAL LEIOMYOMA OF UTERUS: ICD-10-CM

## 2023-12-21 DIAGNOSIS — Z87.898 HISTORY OF SYNCOPE: ICD-10-CM

## 2023-12-21 DIAGNOSIS — R42 DIZZINESS: ICD-10-CM

## 2023-12-21 DIAGNOSIS — G43.809 OTHER MIGRAINE WITHOUT STATUS MIGRAINOSUS, NOT INTRACTABLE: Chronic | ICD-10-CM

## 2023-12-21 PROCEDURE — 76817 TRANSVAGINAL US OBSTETRIC: CPT | Performed by: OBSTETRICS & GYNECOLOGY

## 2023-12-21 PROCEDURE — 99213 OFFICE O/P EST LOW 20 MIN: CPT | Performed by: OBSTETRICS & GYNECOLOGY

## 2023-12-21 PROCEDURE — 76811 OB US DETAILED SNGL FETUS: CPT | Performed by: OBSTETRICS & GYNECOLOGY

## 2023-12-21 NOTE — PROGRESS NOTES
2023      Dianna Sabillon, DO  [address] [facility]  3rd [place] [address]     RE:  SARA TELLEZ  : 1995   AGE: 28 y.o.    This report has been created using voice recognition software. It may contain errors which are inherent in voice recognition technology.    Dear Dr. Sabillon:      I had the pleasure of meeting with Ms. Tellez for a return consultation.  As you know, Ms. Tellez  is a 28 y.o.  at 19w5d (LMP = 13 WK US) who is being followed by our office for multiple medical issues.      Today, Ms. Tellez reports that she feels well.  She notes good fetal movement and denies any symptoms of leaking of fluid, vaginal bleeding, and/or contractions.  She had a fetal ultrasound that was notable for the following.  There is a single intrauterine gestation in a breech presentation with a heart rate of 146 beats per minute.  The placenta is anterior.  The amniotic fluid index is normal.  The composite gestational age is 19w3d.  Transvaginal cervical length 4.8 cm.  Uterine fibroid seen measuring 5.6 x 5.6 x 4.3    PERTINENT PHYSICAL EXAMINATION:   /75 (Position: Sitting)   Pulse 89   Ht 1.727 m (5' 8\")   Wt 80.9 kg (178 lb 6.4 oz)   LMP 2023   BMI 27.13 kg/m²     Urine dipstick:   No urine sample      A fetal ultrasound assessment was performed today. A report is enclosed for your review.    Assessment & Plan:  28 y.o.  at 19w5d (LMP = 13 WK US) with:    1.  Genetic counseling -- The patient was previously counseled regarding her options for genetic screening and/or diagnostic testing.  Counseling was reviewed.     The patient completed screening with NIPT.  Testing was completed on 10/20/2023.  The fetal fraction was 7.5%.  Results were low risk.     The patient's prenatal record was reviewed.  She had screening for cystic fibrosis that was negative on 10/20/2023.  Screening for spinal muscular atrophy was also completed on 10/20/2023.  She was noted to have 2 copies

## 2023-12-21 NOTE — PROGRESS NOTES
Patient is here for bpp/nst. Patient denies any vaginal bleeding, leakage of fluid or cramping. Patient has good fetal movement. Patient will provide urine by the end of appt.

## 2024-01-05 PROBLEM — R42 DIZZINESS: Status: ACTIVE | Noted: 2024-01-05

## 2024-01-17 ENCOUNTER — ROUTINE PRENATAL (OUTPATIENT)
Dept: OBGYN | Age: 29
End: 2024-01-17
Payer: COMMERCIAL

## 2024-01-17 VITALS
WEIGHT: 182.1 LBS | BODY MASS INDEX: 27.69 KG/M2 | SYSTOLIC BLOOD PRESSURE: 118 MMHG | DIASTOLIC BLOOD PRESSURE: 62 MMHG | HEART RATE: 92 BPM

## 2024-01-17 DIAGNOSIS — Q67.2 DOLICHOCEPHALY: ICD-10-CM

## 2024-01-17 DIAGNOSIS — G25.81 RESTLESS LEG SYNDROME: ICD-10-CM

## 2024-01-17 DIAGNOSIS — D25.1 INTRAMURAL LEIOMYOMA OF UTERUS: ICD-10-CM

## 2024-01-17 DIAGNOSIS — R55 VASOVAGAL SYNCOPE: ICD-10-CM

## 2024-01-17 DIAGNOSIS — Z34.02 ENCOUNTER FOR SUPERVISION OF NORMAL FIRST PREGNANCY IN SECOND TRIMESTER: Primary | ICD-10-CM

## 2024-01-17 LAB
GLUCOSE URINE, POC: NORMAL
PROTEIN UA: POSITIVE

## 2024-01-17 PROCEDURE — 81002 URINALYSIS NONAUTO W/O SCOPE: CPT | Performed by: OBSTETRICS & GYNECOLOGY

## 2024-01-17 PROCEDURE — 0502F SUBSEQUENT PRENATAL CARE: CPT | Performed by: OBSTETRICS & GYNECOLOGY

## 2024-01-17 ASSESSMENT — PATIENT HEALTH QUESTIONNAIRE - PHQ9
SUM OF ALL RESPONSES TO PHQ QUESTIONS 1-9: 0
SUM OF ALL RESPONSES TO PHQ QUESTIONS 1-9: 0
2. FEELING DOWN, DEPRESSED OR HOPELESS: 0
SUM OF ALL RESPONSES TO PHQ QUESTIONS 1-9: 0
SUM OF ALL RESPONSES TO PHQ QUESTIONS 1-9: 0
SUM OF ALL RESPONSES TO PHQ9 QUESTIONS 1 & 2: 0
1. LITTLE INTEREST OR PLEASURE IN DOING THINGS: 0

## 2024-01-17 NOTE — PROGRESS NOTES
Lex Tellez    Patient present for routine prenatal visit today at 23w4d.     Patient saw [facility] 12/21/23. Baby was in breech presentation. Dolichocephaly was noted on that scan. Will continue to monitor head measurements. Next appointment is 1/24/24.     Patient with history of vasovagal syncope. Symptoms have been controlled. Labs ordered through Dr. Brandon were normal.     Patient with restless leg syndrome. Doing well with benadryl. Can add magnesium supplements.     Denies complaints  Denies VB, or cramping  Feeling good movement at this time.  Reviewed above.    Fetal Heart Rate: 145    Tdap reviewed with patient, including significance to self and close caretakers. Patient declines today.     All questions and concerns addressed at this time    Lex ROA was seen today for routine prenatal visit.    Diagnoses and all orders for this visit:    Encounter for supervision of normal first pregnancy in second trimester  -     POCT urine qual dipstick glucose  -     POCT urine qual dipstick protein    Intramural leiomyoma of uterus    Vasovagal syncope    Restless leg syndrome    Dolichocephaly        Return in about 4 weeks (around 2/14/2024) for OB visit.    Laura Carmen MD

## 2024-01-17 NOTE — PROGRESS NOTES
Patient alert and pleasant with no complaints  Here today for routine prenatal visit. Fetal Heart tones obtained. Urine for glucose results (Negative) and protein results (Trace).  Discharge instructions have been discussed with the patient. Patient advised to call our office with any questions or concerns.   Voiced understanding.

## 2024-01-21 ENCOUNTER — APPOINTMENT (OUTPATIENT)
Dept: CT IMAGING | Age: 29
End: 2024-01-21
Payer: COMMERCIAL

## 2024-01-21 ENCOUNTER — HOSPITAL ENCOUNTER (EMERGENCY)
Age: 29
Discharge: HOME | End: 2024-01-21
Attending: STUDENT IN AN ORGANIZED HEALTH CARE EDUCATION/TRAINING PROGRAM
Payer: COMMERCIAL

## 2024-01-21 ENCOUNTER — APPOINTMENT (OUTPATIENT)
Dept: GENERAL RADIOLOGY | Age: 29
End: 2024-01-21
Payer: COMMERCIAL

## 2024-01-21 VITALS
RESPIRATION RATE: 20 BRPM | HEART RATE: 101 BPM | OXYGEN SATURATION: 100 % | DIASTOLIC BLOOD PRESSURE: 80 MMHG | TEMPERATURE: 97.6 F | SYSTOLIC BLOOD PRESSURE: 115 MMHG

## 2024-01-21 DIAGNOSIS — D72.829 LEUKOCYTOSIS, UNSPECIFIED TYPE: ICD-10-CM

## 2024-01-21 DIAGNOSIS — R07.9 CHEST PAIN, UNSPECIFIED TYPE: Primary | ICD-10-CM

## 2024-01-21 LAB
ALBUMIN SERPL-MCNC: 3.6 G/DL (ref 3.5–5.2)
ALP SERPL-CCNC: 85 U/L (ref 35–104)
ALT SERPL-CCNC: 11 U/L (ref 0–32)
ANION GAP SERPL CALCULATED.3IONS-SCNC: 9 MMOL/L (ref 7–16)
AST SERPL-CCNC: 17 U/L (ref 0–31)
BASOPHILS # BLD: 0.15 K/UL (ref 0–0.2)
BASOPHILS NFR BLD: 1 % (ref 0–2)
BILIRUB DIRECT SERPL-MCNC: <0.2 MG/DL (ref 0–0.3)
BILIRUB INDIRECT SERPL-MCNC: NORMAL MG/DL (ref 0–1)
BILIRUB SERPL-MCNC: 0.2 MG/DL (ref 0–1.2)
BILIRUB UR QL STRIP: NEGATIVE
BNP SERPL-MCNC: 92 PG/ML (ref 0–125)
BUN SERPL-MCNC: 7 MG/DL (ref 6–20)
CALCIUM SERPL-MCNC: 9.1 MG/DL (ref 8.6–10.2)
CHLORIDE SERPL-SCNC: 102 MMOL/L (ref 98–107)
CLARITY UR: CLEAR
CO2 SERPL-SCNC: 24 MMOL/L (ref 22–29)
COLOR UR: YELLOW
CREAT SERPL-MCNC: 0.6 MG/DL (ref 0.5–1)
EOSINOPHIL # BLD: 0.43 K/UL (ref 0.05–0.5)
EOSINOPHILS RELATIVE PERCENT: 2 % (ref 0–6)
ERYTHROCYTE [DISTWIDTH] IN BLOOD BY AUTOMATED COUNT: 12.9 % (ref 11.5–15)
GFR SERPL CREATININE-BSD FRML MDRD: >60 ML/MIN/1.73M2
GLUCOSE SERPL-MCNC: 84 MG/DL (ref 74–99)
GLUCOSE UR STRIP-MCNC: NEGATIVE MG/DL
HCT VFR BLD AUTO: 39.5 % (ref 34–48)
HGB BLD-MCNC: 12.5 G/DL (ref 11.5–15.5)
HGB UR QL STRIP.AUTO: NEGATIVE
IMM GRANULOCYTES # BLD AUTO: 0.12 K/UL (ref 0–0.58)
IMM GRANULOCYTES NFR BLD: 1 % (ref 0–5)
KETONES UR STRIP-MCNC: NEGATIVE MG/DL
LEUKOCYTE ESTERASE UR QL STRIP: NEGATIVE
LYMPHOCYTES NFR BLD: 2.11 K/UL (ref 1.5–4)
LYMPHOCYTES RELATIVE PERCENT: 11 % (ref 20–42)
MCH RBC QN AUTO: 29 PG (ref 26–35)
MCHC RBC AUTO-ENTMCNC: 31.6 G/DL (ref 32–34.5)
MCV RBC AUTO: 91.6 FL (ref 80–99.9)
MONOCYTES NFR BLD: 0.89 K/UL (ref 0.1–0.95)
MONOCYTES NFR BLD: 5 % (ref 2–12)
NEUTROPHILS NFR BLD: 80 % (ref 43–80)
NEUTS SEG NFR BLD: 14.82 K/UL (ref 1.8–7.3)
NITRITE UR QL STRIP: NEGATIVE
PH UR STRIP: 6 [PH] (ref 5–9)
PLATELET # BLD AUTO: 395 K/UL (ref 130–450)
PMV BLD AUTO: 9.4 FL (ref 7–12)
POTASSIUM SERPL-SCNC: 3.7 MMOL/L (ref 3.5–5)
PROT SERPL-MCNC: 6.6 G/DL (ref 6.4–8.3)
PROT UR STRIP-MCNC: NEGATIVE MG/DL
RBC # BLD AUTO: 4.31 M/UL (ref 3.5–5.5)
RBC #/AREA URNS HPF: NORMAL /HPF
SODIUM SERPL-SCNC: 135 MMOL/L (ref 132–146)
SP GR UR STRIP: 1.01 (ref 1–1.03)
TROPONIN I SERPL HS-MCNC: <6 NG/L (ref 0–9)
UROBILINOGEN UR STRIP-ACNC: 0.2 EU/DL (ref 0–1)
WBC #/AREA URNS HPF: NORMAL /HPF
WBC OTHER # BLD: 18.5 K/UL (ref 4.5–11.5)

## 2024-01-21 PROCEDURE — 87086 URINE CULTURE/COLONY COUNT: CPT

## 2024-01-21 PROCEDURE — 83880 ASSAY OF NATRIURETIC PEPTIDE: CPT

## 2024-01-21 PROCEDURE — 6360000004 HC RX CONTRAST MEDICATION: Performed by: RADIOLOGY

## 2024-01-21 PROCEDURE — 82248 BILIRUBIN DIRECT: CPT

## 2024-01-21 PROCEDURE — 93005 ELECTROCARDIOGRAM TRACING: CPT | Performed by: EMERGENCY MEDICINE

## 2024-01-21 PROCEDURE — 80053 COMPREHEN METABOLIC PANEL: CPT

## 2024-01-21 PROCEDURE — 85025 COMPLETE CBC W/AUTO DIFF WBC: CPT

## 2024-01-21 PROCEDURE — 84484 ASSAY OF TROPONIN QUANT: CPT

## 2024-01-21 PROCEDURE — 99285 EMERGENCY DEPT VISIT HI MDM: CPT

## 2024-01-21 PROCEDURE — 71045 X-RAY EXAM CHEST 1 VIEW: CPT

## 2024-01-21 PROCEDURE — A4216 STERILE WATER/SALINE, 10 ML: HCPCS | Performed by: EMERGENCY MEDICINE

## 2024-01-21 PROCEDURE — 96374 THER/PROPH/DIAG INJ IV PUSH: CPT

## 2024-01-21 PROCEDURE — 81001 URINALYSIS AUTO W/SCOPE: CPT

## 2024-01-21 PROCEDURE — 2580000003 HC RX 258: Performed by: EMERGENCY MEDICINE

## 2024-01-21 PROCEDURE — 71275 CT ANGIOGRAPHY CHEST: CPT

## 2024-01-21 PROCEDURE — 2500000003 HC RX 250 WO HCPCS: Performed by: EMERGENCY MEDICINE

## 2024-01-21 PROCEDURE — 6370000000 HC RX 637 (ALT 250 FOR IP): Performed by: EMERGENCY MEDICINE

## 2024-01-21 RX ORDER — 0.9 % SODIUM CHLORIDE 0.9 %
1000 INTRAVENOUS SOLUTION INTRAVENOUS ONCE
Status: COMPLETED | OUTPATIENT
Start: 2024-01-21 | End: 2024-01-21

## 2024-01-21 RX ORDER — ACETAMINOPHEN 325 MG/1
650 TABLET ORAL ONCE
Status: COMPLETED | OUTPATIENT
Start: 2024-01-21 | End: 2024-01-21

## 2024-01-21 RX ORDER — SUCRALFATE 1 G/1
1 TABLET ORAL ONCE
Status: COMPLETED | OUTPATIENT
Start: 2024-01-21 | End: 2024-01-21

## 2024-01-21 RX ADMIN — IOPAMIDOL 75 ML: 755 INJECTION, SOLUTION INTRAVENOUS at 06:22

## 2024-01-21 RX ADMIN — SUCRALFATE 1 G: 1 TABLET ORAL at 05:36

## 2024-01-21 RX ADMIN — SODIUM CHLORIDE 1000 ML: 9 INJECTION, SOLUTION INTRAVENOUS at 05:36

## 2024-01-21 RX ADMIN — ACETAMINOPHEN 650 MG: 325 TABLET ORAL at 08:18

## 2024-01-21 RX ADMIN — LIDOCAINE HYDROCHLORIDE: 20 SOLUTION ORAL; TOPICAL at 05:35

## 2024-01-21 RX ADMIN — Medication 20 MG: at 05:35

## 2024-01-21 NOTE — ED TRIAGE NOTES
hest pain started 7 hours ago- extends to jaw and left arm, also back center where chest pain is.   24 weeks pregnant- sees lily Alfaro

## 2024-01-21 NOTE — DISCHARGE INSTRUCTIONS
Call OBGYN doctor tomorrow and schedule a followup appointment to be seen in 2 days    Have your doctor obtain  finalized report of all results

## 2024-01-21 NOTE — ED PROVIDER NOTES
focal deficits, symmetric strength 5/5 in the upper and lower extremities bilaterally  Psychiatric: Normal Affect      DIAGNOSTIC RESULTS   LABS:    Labs Reviewed   CBC WITH AUTO DIFFERENTIAL - Abnormal; Notable for the following components:       Result Value    WBC 18.5 (*)     MCHC 31.6 (*)     Lymphocytes % 11 (*)     Neutrophils Absolute 14.82 (*)     All other components within normal limits   CULTURE, URINE   BASIC METABOLIC PANEL   HEPATIC FUNCTION PANEL   TROPONIN   BRAIN NATRIURETIC PEPTIDE   URINALYSIS WITH MICROSCOPIC       As interpreted by me, the above displayed labs are abnormal. All other labs obtained during this visit were within normal range or not returned as of this dictation.    RADIOLOGY:   Non-plain film images such as CT, Ultrasound and MRI are read by the radiologist. Plain radiographic images are visualized and preliminarily interpreted by the ED Provider with the below findings:    Interpretation per the Radiologist below, if available at the time of this note:    CTA PULMONARY W CONTRAST   Final Result   No evidence of pulmonary embolism or acute pulmonary abnormality.         XR CHEST PORTABLE   Final Result   No acute disease.      RECOMMENDATION:   Careful clinical correlation and follow up recommended.           No results found.    No results found.    Is this patient to be included in the SEP-1 core measure due to severe sepsis or septic shock? No Exclusion criteria - the patient is NOT to be included for SEP-1 Core Measure due to: Infection is not suspected    PROCEDURES   Unless otherwise noted below, none    PAST MEDICAL HISTORY/Chronic Conditions Affecting Care      has a past medical history of Colitis (04/01/2021), Migraine (2017), Migraine headache (10/31/2018), and Vasovagal syncopes.     EMERGENCY DEPARTMENT COURSE    Vitals:    Vitals:    01/21/24 0359 01/21/24 0532 01/21/24 0652   BP: (!) 152/92 (!) 142/96 115/80   Pulse: 100 (!) 105 (!) 101   Resp: 20 17 20   Temp:

## 2024-01-22 LAB
EKG ATRIAL RATE: 106 BPM
EKG P AXIS: 75 DEGREES
EKG P-R INTERVAL: 150 MS
EKG Q-T INTERVAL: 350 MS
EKG QRS DURATION: 84 MS
EKG QTC CALCULATION (BAZETT): 464 MS
EKG R AXIS: 78 DEGREES
EKG T AXIS: 75 DEGREES
EKG VENTRICULAR RATE: 106 BPM
MICROORGANISM SPEC CULT: NO GROWTH
SPECIMEN DESCRIPTION: NORMAL

## 2024-01-22 PROCEDURE — 93010 ELECTROCARDIOGRAM REPORT: CPT | Performed by: INTERNAL MEDICINE

## 2024-01-24 ENCOUNTER — ROUTINE PRENATAL (OUTPATIENT)
Dept: OBGYN CLINIC | Age: 29
End: 2024-01-24
Payer: COMMERCIAL

## 2024-01-24 ENCOUNTER — ANCILLARY PROCEDURE (OUTPATIENT)
Dept: OBGYN CLINIC | Age: 29
End: 2024-01-24
Payer: COMMERCIAL

## 2024-01-24 VITALS
BODY MASS INDEX: 28.13 KG/M2 | HEART RATE: 97 BPM | WEIGHT: 185 LBS | DIASTOLIC BLOOD PRESSURE: 76 MMHG | SYSTOLIC BLOOD PRESSURE: 118 MMHG

## 2024-01-24 DIAGNOSIS — Z87.898 HISTORY OF SYNCOPE: ICD-10-CM

## 2024-01-24 DIAGNOSIS — Z3A.24 24 WEEKS GESTATION OF PREGNANCY: Primary | ICD-10-CM

## 2024-01-24 DIAGNOSIS — R07.1 CHEST PAIN ON BREATHING: ICD-10-CM

## 2024-01-24 DIAGNOSIS — R42 DIZZINESS: ICD-10-CM

## 2024-01-24 DIAGNOSIS — R79.0 LOW FERRITIN: ICD-10-CM

## 2024-01-24 DIAGNOSIS — R07.9: ICD-10-CM

## 2024-01-24 DIAGNOSIS — O16.2 ELEVATED BLOOD PRESSURE AFFECTING PREGNANCY IN SECOND TRIMESTER, ANTEPARTUM: ICD-10-CM

## 2024-01-24 DIAGNOSIS — G43.809 OTHER MIGRAINE WITHOUT STATUS MIGRAINOSUS, NOT INTRACTABLE: ICD-10-CM

## 2024-01-24 LAB
GLUCOSE URINE, POC: NEGATIVE
PROTEIN UA: NEGATIVE

## 2024-01-24 PROCEDURE — 76816 OB US FOLLOW-UP PER FETUS: CPT | Performed by: OBSTETRICS & GYNECOLOGY

## 2024-01-24 PROCEDURE — 81002 URINALYSIS NONAUTO W/O SCOPE: CPT | Performed by: OBSTETRICS & GYNECOLOGY

## 2024-01-24 PROCEDURE — 99214 OFFICE O/P EST MOD 30 MIN: CPT | Performed by: OBSTETRICS & GYNECOLOGY

## 2024-01-24 PROCEDURE — 99213 OFFICE O/P EST LOW 20 MIN: CPT | Performed by: OBSTETRICS & GYNECOLOGY

## 2024-01-24 NOTE — PATIENT INSTRUCTIONS
Please arrive for your scheduled appointment at least 15 minutes early with your actual insurance card+ a photo ID. Also if you need any refills ordered or have questions, it may take up 48 hours to reply. Please allow ample time for your refills. Call me when you use last refill. Thank you for your cooperation. Call your primary obstetrician with bleeding, leaking of fluid, abdominal tenderness, headache, blurry vision, epigastric pain and increased urinary frequency.If you are experiencing an emergency and need immediate help, call 911 or go to go emergency room or labor and delivery. if you are sick, not feeling well or have an infectious process going on please reschedule your appointment by calling [phone]. Also if any family members are not feeling well, please do not bring them to your appointment. We appreciate your cooperation. We are doing this in order to protect our pregnant mothers+ their babies.if you are sick, not feeling well or have an infectious process going on please reschedule your appointment by calling [phone]. Also if any family members are not feeling well, please do not bring them to your appointment. We appreciate your cooperation. We are doing this in order to protect our pregnant mothers+ their babies.

## 2024-01-29 ENCOUNTER — HOSPITAL ENCOUNTER (OUTPATIENT)
Age: 29
Discharge: HOME | End: 2024-01-29
Payer: COMMERCIAL

## 2024-01-29 DIAGNOSIS — R07.1 CHEST PAIN ON BREATHING: ICD-10-CM

## 2024-01-29 DIAGNOSIS — G43.809 OTHER MIGRAINE WITHOUT STATUS MIGRAINOSUS, NOT INTRACTABLE: ICD-10-CM

## 2024-01-29 DIAGNOSIS — Z87.898 HISTORY OF SYNCOPE: ICD-10-CM

## 2024-01-29 DIAGNOSIS — R79.0 LOW FERRITIN: ICD-10-CM

## 2024-01-29 DIAGNOSIS — Z3A.24 24 WEEKS GESTATION OF PREGNANCY: ICD-10-CM

## 2024-01-29 DIAGNOSIS — R42 DIZZINESS: ICD-10-CM

## 2024-01-29 DIAGNOSIS — O16.2 ELEVATED BLOOD PRESSURE AFFECTING PREGNANCY IN SECOND TRIMESTER, ANTEPARTUM: ICD-10-CM

## 2024-01-29 LAB
25(OH)D3 SERPL-MCNC: 50 NG/ML (ref 30–100)
ALBUMIN SERPL-MCNC: 3.7 G/DL (ref 3.5–5.2)
ALP SERPL-CCNC: 99 U/L (ref 35–104)
ALT SERPL-CCNC: 10 U/L (ref 0–32)
ANION GAP SERPL CALCULATED.3IONS-SCNC: 9 MMOL/L (ref 7–16)
AST SERPL-CCNC: 16 U/L (ref 0–31)
BACTERIA URNS QL MICRO: ABNORMAL
BASOPHILS # BLD: 0.12 K/UL (ref 0–0.2)
BASOPHILS NFR BLD: 1 % (ref 0–2)
BILIRUB SERPL-MCNC: 0.2 MG/DL (ref 0–1.2)
BILIRUB UR QL STRIP: NEGATIVE
BUN SERPL-MCNC: 10 MG/DL (ref 6–20)
CALCIUM SERPL-MCNC: 9.5 MG/DL (ref 8.6–10.2)
CHLORIDE SERPL-SCNC: 104 MMOL/L (ref 98–107)
CLARITY UR: CLEAR
CO2 SERPL-SCNC: 25 MMOL/L (ref 22–29)
COLOR UR: YELLOW
CREAT SERPL-MCNC: 0.7 MG/DL (ref 0.5–1)
CREAT UR-MCNC: 185.1 MG/DL (ref 29–226)
EOSINOPHIL # BLD: 0.34 K/UL (ref 0.05–0.5)
EOSINOPHILS RELATIVE PERCENT: 2 % (ref 0–6)
EPI CELLS #/AREA URNS HPF: ABNORMAL /HPF
ERYTHROCYTE [DISTWIDTH] IN BLOOD BY AUTOMATED COUNT: 13 % (ref 11.5–15)
FERRITIN SERPL-MCNC: 22 NG/ML
FOLATE SERPL-MCNC: >20 NG/ML (ref 4.8–24.2)
GFR SERPL CREATININE-BSD FRML MDRD: >60 ML/MIN/1.73M2
GLUCOSE SERPL-MCNC: 89 MG/DL (ref 74–99)
GLUCOSE UR STRIP-MCNC: NEGATIVE MG/DL
HBA1C MFR BLD: 5 % (ref 4–5.6)
HCT VFR BLD AUTO: 41.9 % (ref 34–48)
HGB BLD-MCNC: 12.8 G/DL (ref 11.5–15.5)
HGB UR QL STRIP.AUTO: NEGATIVE
IMM GRANULOCYTES # BLD AUTO: 0.14 K/UL (ref 0–0.58)
IMM GRANULOCYTES NFR BLD: 1 % (ref 0–5)
KETONES UR STRIP-MCNC: ABNORMAL MG/DL
LDH SERPL-CCNC: 161 U/L (ref 135–214)
LEUKOCYTE ESTERASE UR QL STRIP: NEGATIVE
LYMPHOCYTES NFR BLD: 2.19 K/UL (ref 1.5–4)
LYMPHOCYTES RELATIVE PERCENT: 13 % (ref 20–42)
MAGNESIUM SERPL-MCNC: 2 MG/DL (ref 1.6–2.6)
MCH RBC QN AUTO: 28.7 PG (ref 26–35)
MCHC RBC AUTO-ENTMCNC: 30.5 G/DL (ref 32–34.5)
MCV RBC AUTO: 93.9 FL (ref 80–99.9)
MONOCYTES NFR BLD: 0.9 K/UL (ref 0.1–0.95)
MONOCYTES NFR BLD: 5 % (ref 2–12)
NEUTROPHILS NFR BLD: 78 % (ref 43–80)
NEUTS SEG NFR BLD: 13 K/UL (ref 1.8–7.3)
NITRITE UR QL STRIP: NEGATIVE
PH UR STRIP: 6 [PH] (ref 5–9)
PLATELET # BLD AUTO: 402 K/UL (ref 130–450)
PMV BLD AUTO: 9.5 FL (ref 7–12)
POTASSIUM SERPL-SCNC: 4 MMOL/L (ref 3.5–5)
PROT SERPL-MCNC: 7.2 G/DL (ref 6.4–8.3)
PROT UR STRIP-MCNC: NEGATIVE MG/DL
RBC # BLD AUTO: 4.46 M/UL (ref 3.5–5.5)
RBC #/AREA URNS HPF: ABNORMAL /HPF
SODIUM SERPL-SCNC: 138 MMOL/L (ref 132–146)
SP GR UR STRIP: 1.02 (ref 1–1.03)
T4 FREE SERPL-MCNC: 0.9 NG/DL (ref 0.9–1.7)
TOTAL PROTEIN, URINE: 11 MG/DL (ref 0–12)
TSH SERPL DL<=0.05 MIU/L-ACNC: 0.99 UIU/ML (ref 0.27–4.2)
URATE SERPL-MCNC: 4.6 MG/DL (ref 2.4–5.7)
URINE TOTAL PROTEIN CREATININE RATIO: 0.06 (ref 0–0.2)
UROBILINOGEN UR STRIP-ACNC: 0.2 EU/DL (ref 0–1)
VIT B12 SERPL-MCNC: 684 PG/ML (ref 211–946)
WBC #/AREA URNS HPF: ABNORMAL /HPF
WBC OTHER # BLD: 16.7 K/UL (ref 4.5–11.5)

## 2024-01-29 PROCEDURE — 81001 URINALYSIS AUTO W/SCOPE: CPT

## 2024-01-29 PROCEDURE — 80053 COMPREHEN METABOLIC PANEL: CPT

## 2024-01-29 PROCEDURE — 36415 COLL VENOUS BLD VENIPUNCTURE: CPT

## 2024-01-29 PROCEDURE — 84443 ASSAY THYROID STIM HORMONE: CPT

## 2024-01-29 PROCEDURE — 83036 HEMOGLOBIN GLYCOSYLATED A1C: CPT

## 2024-01-29 PROCEDURE — 84550 ASSAY OF BLOOD/URIC ACID: CPT

## 2024-01-29 PROCEDURE — 83615 LACTATE (LD) (LDH) ENZYME: CPT

## 2024-01-29 PROCEDURE — 82306 VITAMIN D 25 HYDROXY: CPT

## 2024-01-29 PROCEDURE — 84156 ASSAY OF PROTEIN URINE: CPT

## 2024-01-29 PROCEDURE — 82728 ASSAY OF FERRITIN: CPT

## 2024-01-29 PROCEDURE — 82570 ASSAY OF URINE CREATININE: CPT

## 2024-01-29 PROCEDURE — 84439 ASSAY OF FREE THYROXINE: CPT

## 2024-01-29 PROCEDURE — 83735 ASSAY OF MAGNESIUM: CPT

## 2024-01-29 PROCEDURE — 85025 COMPLETE CBC W/AUTO DIFF WBC: CPT

## 2024-01-29 PROCEDURE — 82607 VITAMIN B-12: CPT

## 2024-01-29 PROCEDURE — 82746 ASSAY OF FOLIC ACID SERUM: CPT

## 2024-01-29 PROCEDURE — 87086 URINE CULTURE/COLONY COUNT: CPT

## 2024-01-30 LAB
MICROORGANISM SPEC CULT: NO GROWTH
SPECIMEN DESCRIPTION: NORMAL

## 2024-01-31 ENCOUNTER — TELEPHONE (OUTPATIENT)
Dept: OBGYN CLINIC | Age: 29
End: 2024-01-31

## 2024-01-31 PROBLEM — R07.1 CHEST PAIN ON BREATHING: Status: ACTIVE | Noted: 2024-01-31

## 2024-01-31 PROBLEM — O16.2 ELEVATED BLOOD PRESSURE AFFECTING PREGNANCY IN SECOND TRIMESTER, ANTEPARTUM: Status: ACTIVE | Noted: 2024-01-31

## 2024-01-31 PROBLEM — R79.0 LOW FERRITIN: Status: ACTIVE | Noted: 2024-01-31

## 2024-01-31 NOTE — TELEPHONE ENCOUNTER
Pt returned call and informed of Dr Olivares recommendations to continue iron and that WBCs were improving.  She verbalized understanding

## 2024-02-06 ENCOUNTER — ANCILLARY PROCEDURE (OUTPATIENT)
Dept: OBGYN CLINIC | Age: 29
End: 2024-02-06
Payer: COMMERCIAL

## 2024-02-06 ENCOUNTER — ROUTINE PRENATAL (OUTPATIENT)
Dept: OBGYN CLINIC | Age: 29
End: 2024-02-06
Payer: COMMERCIAL

## 2024-02-06 VITALS
BODY MASS INDEX: 28.77 KG/M2 | HEART RATE: 85 BPM | DIASTOLIC BLOOD PRESSURE: 76 MMHG | WEIGHT: 189.2 LBS | SYSTOLIC BLOOD PRESSURE: 113 MMHG

## 2024-02-06 DIAGNOSIS — Z87.898 HISTORY OF SYNCOPE: ICD-10-CM

## 2024-02-06 DIAGNOSIS — O16.2 ELEVATED BLOOD PRESSURE AFFECTING PREGNANCY IN SECOND TRIMESTER, ANTEPARTUM: ICD-10-CM

## 2024-02-06 DIAGNOSIS — R42 DIZZINESS: ICD-10-CM

## 2024-02-06 DIAGNOSIS — D25.1 INTRAMURAL LEIOMYOMA OF UTERUS: ICD-10-CM

## 2024-02-06 DIAGNOSIS — Z3A.26 26 WEEKS GESTATION OF PREGNANCY: Primary | ICD-10-CM

## 2024-02-06 LAB
GLUCOSE URINE, POC: NEGATIVE
PROTEIN UA: NEGATIVE

## 2024-02-06 PROCEDURE — 81002 URINALYSIS NONAUTO W/O SCOPE: CPT | Performed by: OBSTETRICS & GYNECOLOGY

## 2024-02-06 PROCEDURE — 76817 TRANSVAGINAL US OBSTETRIC: CPT | Performed by: OBSTETRICS & GYNECOLOGY

## 2024-02-06 PROCEDURE — 99213 OFFICE O/P EST LOW 20 MIN: CPT | Performed by: OBSTETRICS & GYNECOLOGY

## 2024-02-06 PROCEDURE — 76815 OB US LIMITED FETUS(S): CPT | Performed by: OBSTETRICS & GYNECOLOGY

## 2024-02-06 NOTE — PROGRESS NOTES
2024      Dianna Sabillon, DO  [address] [facility]  3rd [place] [address]     RE:  SARA TELLEZ  : 1995   AGE: 29 y.o.    This report has been created using voice recognition software. It may contain errors which are inherent in voice recognition technology.    Dear Dr. Sabillon:      I had the pleasure of meeting with Ms. Tellez for a return consultation.  As you know, Ms. Tellez  is a 29 y.o.  at 26w3d (LMP = 13 WK US) who is being followed by our office for multiple medical issues.      Today, Ms. Tellez reports that she feels well.  She notes good fetal movement and denies any symptoms of leaking of fluid, vaginal bleeding, and/or contractions.  She had a fetal ultrasound that was notable for the following.  There is a single intrauterine gestation in a breech presentation with a heart rate of 148 beats per minute.  The placenta is anterior.  The amniotic fluid index is 15.2 cm.  Transvaginal cervical length 4.1 cm without funneling.  Fibroid seen measuring 5 x 3.3 x 5.3 cm.      PERTINENT PHYSICAL EXAMINATION:   /76   Pulse 85   Wt 85.8 kg (189 lb 3.2 oz)   LMP 2023   BMI 28.77 kg/m²     Urine dipstick:   Negative for Glucose    Negative for Albumin      A fetal ultrasound assessment was performed today. A report is enclosed for your review.    Assessment & Plan:  29 y.o.  at 26w3d (LMP = 13 WK US) with:    1.  Genetic counseling -- The patient was previously counseled regarding her options for genetic screening and/or diagnostic testing.  Counseling was reviewed.     The patient completed screening with NIPT.  Testing was completed on 10/20/2023.  The fetal fraction was 7.5%.  Results were low risk.     The patient's prenatal record was reviewed.  She had screening for cystic fibrosis that was negative on 10/20/2023.  Screening for spinal muscular atrophy was also completed on 10/20/2023.  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

## 2024-02-13 ENCOUNTER — ROUTINE PRENATAL (OUTPATIENT)
Dept: OBGYN | Age: 29
End: 2024-02-13
Payer: COMMERCIAL

## 2024-02-13 ENCOUNTER — HOSPITAL ENCOUNTER (OUTPATIENT)
Age: 29
Discharge: HOME | End: 2024-02-13
Payer: COMMERCIAL

## 2024-02-13 VITALS
BODY MASS INDEX: 28.71 KG/M2 | HEART RATE: 81 BPM | SYSTOLIC BLOOD PRESSURE: 120 MMHG | DIASTOLIC BLOOD PRESSURE: 58 MMHG | WEIGHT: 188.8 LBS

## 2024-02-13 DIAGNOSIS — Z34.02 ENCOUNTER FOR SUPERVISION OF NORMAL FIRST PREGNANCY IN SECOND TRIMESTER: ICD-10-CM

## 2024-02-13 DIAGNOSIS — Z3A.27 27 WEEKS GESTATION OF PREGNANCY: ICD-10-CM

## 2024-02-13 DIAGNOSIS — Z34.02 ENCOUNTER FOR SUPERVISION OF NORMAL FIRST PREGNANCY IN SECOND TRIMESTER: Primary | ICD-10-CM

## 2024-02-13 LAB
ABO + RH BLD: NORMAL
AMOUNT GLUCOSE GIVEN: 50 G
BLOOD BANK SAMPLE EXPIRATION: NORMAL
BLOOD GROUP ANTIBODIES SERPL: NEGATIVE
COLLECT TIME, 1HR GLUCOSE: 1517
ERYTHROCYTE [DISTWIDTH] IN BLOOD BY AUTOMATED COUNT: 13.1 % (ref 11.5–15)
GLUCOSE 3H P 100 G GLC PO SERPL-MCNC: 143 MG/DL
HCT VFR BLD AUTO: 37.5 % (ref 34–48)
HGB BLD-MCNC: 11.7 G/DL (ref 11.5–15.5)
MCH RBC QN AUTO: 28.7 PG (ref 26–35)
MCHC RBC AUTO-ENTMCNC: 31.2 G/DL (ref 32–34.5)
MCV RBC AUTO: 92.1 FL (ref 80–99.9)
PLATELET # BLD AUTO: 359 K/UL (ref 130–450)
PMV BLD AUTO: 9.6 FL (ref 7–12)
RBC # BLD AUTO: 4.07 M/UL (ref 3.5–5.5)
WBC OTHER # BLD: 17.2 K/UL (ref 4.5–11.5)

## 2024-02-13 PROCEDURE — 36415 COLL VENOUS BLD VENIPUNCTURE: CPT

## 2024-02-13 PROCEDURE — 90715 TDAP VACCINE 7 YRS/> IM: CPT | Performed by: OBSTETRICS & GYNECOLOGY

## 2024-02-13 PROCEDURE — 90471 IMMUNIZATION ADMIN: CPT | Performed by: OBSTETRICS & GYNECOLOGY

## 2024-02-13 PROCEDURE — 85027 COMPLETE CBC AUTOMATED: CPT

## 2024-02-13 PROCEDURE — 86592 SYPHILIS TEST NON-TREP QUAL: CPT

## 2024-02-13 PROCEDURE — 86900 BLOOD TYPING SEROLOGIC ABO: CPT

## 2024-02-13 PROCEDURE — 86850 RBC ANTIBODY SCREEN: CPT

## 2024-02-13 PROCEDURE — 82947 ASSAY GLUCOSE BLOOD QUANT: CPT

## 2024-02-13 PROCEDURE — 86901 BLOOD TYPING SEROLOGIC RH(D): CPT

## 2024-02-13 PROCEDURE — 0502F SUBSEQUENT PRENATAL CARE: CPT | Performed by: OBSTETRICS & GYNECOLOGY

## 2024-02-13 NOTE — PROGRESS NOTES
SUBJECTIVE:   29 y.o.  female here for routine OB appointment.   Good fetal movement. No lof, vb, ctx.  ED visit for chest pain and elevated BP 24-symptoms resolved work up negative.  Previously discussed flu and covid vaccines. Pt declines.   28 week labs ordered. On iron. Tdap today. Discussed RSV vaccine.   Discussed PTL and PIH precautions.   F/u 2 wks.     OB History    Para Term  AB Living   1 0       0   SAB IAB Ectopic Molar Multiple Live Births                    # Outcome Date GA Lbr Terry/2nd Weight Sex Delivery Anes PTL Lv   1 Current                Past Medical History:   Diagnosis Date    Colitis 2021    enlarged colon     Migraine 2017    Less frequent, only occasional, usually stress related    Migraine headache 10/31/2018    Vasovagal syncopes         Past Surgical History:   Procedure Laterality Date    SINUS SURGERY      Polyps removed not certain site ie right, left or bilateral    TURBINATE RESECTION N/A 2021    CLOSED REDUCTION NASAL BONE FRACTURE, SUBMUCOUSAL RESECTION OF INFERIOR TURBINATES performed by Jacob Upton MD at [facility]    WISDOM TOOTH EXTRACTION          Family History   Problem Relation Age of Onset    Preeclampsia Mother         I was 3 weeks early because of this    No Known Problems Father     Breast Cancer Maternal Grandmother     Diabetes Paternal Grandmother         Social History       Tobacco History       Smoking Status  Never      Smokeless Tobacco Use  Never              Alcohol History       Alcohol Use Status  Not Currently Drinks/Week  0 Glasses of wine, 0 Cans of beer, 0 Shots of liquor, 0 Drinks containing 0.5 oz of alcohol per week Comment  couple times a month              Drug Use       Drug Use Status  Never              Sexual Activity       Sexually Active  Yes Partners  Male Comment                        Current Outpatient Medications:     Prenatal MV-Min-Fe Fum-FA-DHA (PRENATAL 1 PO), Take by mouth, Disp: ,

## 2024-02-13 NOTE — PROGRESS NOTES
Patient alert and pleasant with no complaints.  Here today for prenatal visit.  Fetal heart tones obtained without difficulty.  TDAP IM given right deltoid without difficulty .   Discharge instructions have been discussed with the patient. Patient advised to call our office with any questions or concerns.   Voiced understanding.

## 2024-02-14 LAB — RPR SER QL: NONREACTIVE

## 2024-02-16 DIAGNOSIS — Z34.02 ENCOUNTER FOR SUPERVISION OF NORMAL FIRST PREGNANCY IN SECOND TRIMESTER: ICD-10-CM

## 2024-02-16 DIAGNOSIS — Z3A.27 27 WEEKS GESTATION OF PREGNANCY: Primary | ICD-10-CM

## 2024-02-20 ENCOUNTER — ANCILLARY PROCEDURE (OUTPATIENT)
Dept: OBGYN CLINIC | Age: 29
End: 2024-02-20
Payer: COMMERCIAL

## 2024-02-20 ENCOUNTER — ROUTINE PRENATAL (OUTPATIENT)
Dept: OBGYN CLINIC | Age: 29
End: 2024-02-20
Payer: COMMERCIAL

## 2024-02-20 VITALS
BODY MASS INDEX: 29.19 KG/M2 | WEIGHT: 192 LBS | DIASTOLIC BLOOD PRESSURE: 84 MMHG | HEART RATE: 97 BPM | SYSTOLIC BLOOD PRESSURE: 124 MMHG

## 2024-02-20 DIAGNOSIS — R79.0 LOW FERRITIN: ICD-10-CM

## 2024-02-20 DIAGNOSIS — D72.829 LEUKOCYTOSIS, UNSPECIFIED TYPE: ICD-10-CM

## 2024-02-20 DIAGNOSIS — R73.09 ABNORMAL GLUCOSE TOLERANCE TEST: ICD-10-CM

## 2024-02-20 DIAGNOSIS — R80.9 URINE PROTEIN INCREASED: ICD-10-CM

## 2024-02-20 DIAGNOSIS — D25.1 INTRAMURAL LEIOMYOMA OF UTERUS: ICD-10-CM

## 2024-02-20 DIAGNOSIS — Z87.898 HISTORY OF SYNCOPE: ICD-10-CM

## 2024-02-20 DIAGNOSIS — Z3A.28 28 WEEKS GESTATION OF PREGNANCY: ICD-10-CM

## 2024-02-20 DIAGNOSIS — O16.2 ELEVATED BLOOD PRESSURE AFFECTING PREGNANCY IN SECOND TRIMESTER, ANTEPARTUM: Primary | ICD-10-CM

## 2024-02-20 LAB
GLUCOSE URINE, POC: NORMAL
PROTEIN UA: NEGATIVE

## 2024-02-20 PROCEDURE — 81002 URINALYSIS NONAUTO W/O SCOPE: CPT | Performed by: OBSTETRICS & GYNECOLOGY

## 2024-02-20 PROCEDURE — 76816 OB US FOLLOW-UP PER FETUS: CPT | Performed by: OBSTETRICS & GYNECOLOGY

## 2024-02-20 PROCEDURE — 76821 MIDDLE CEREBRAL ARTERY ECHO: CPT | Performed by: OBSTETRICS & GYNECOLOGY

## 2024-02-20 PROCEDURE — 76817 TRANSVAGINAL US OBSTETRIC: CPT | Performed by: OBSTETRICS & GYNECOLOGY

## 2024-02-20 PROCEDURE — 76820 UMBILICAL ARTERY ECHO: CPT | Performed by: OBSTETRICS & GYNECOLOGY

## 2024-02-20 PROCEDURE — 76819 FETAL BIOPHYS PROFIL W/O NST: CPT | Performed by: OBSTETRICS & GYNECOLOGY

## 2024-02-20 PROCEDURE — 99213 OFFICE O/P EST LOW 20 MIN: CPT | Performed by: OBSTETRICS & GYNECOLOGY

## 2024-02-20 PROCEDURE — 99214 OFFICE O/P EST MOD 30 MIN: CPT | Performed by: OBSTETRICS & GYNECOLOGY

## 2024-02-20 NOTE — PROGRESS NOTES
Lex Tellez here for follow up ultrasound today.   She denies bleeding, lof, or cramping.   Positive fetal movement.    Failed 1 hour gtt, Dr. Sabillon ordered 3 hour gtt.

## 2024-02-20 NOTE — PROGRESS NOTES
2024      Dianna Sabillon, DO  [address] [facility]  3rd [place] [address]     RE:  SARA TELLEZ  : 1995   AGE: 29 y.o.    This report has been created using voice recognition software. It may contain errors which are inherent in voice recognition technology.    Dear Dr. Sabillon:      I had the pleasure of meeting with Ms. Tellez for a return consultation.  As you know, Ms. Tellez  is a 29 y.o.  at 28w3d (LMP = 13 WK US) who is being followed by our office for multiple medical issues.      Today, Ms. Tellez reports that she feels well.  She notes good fetal movement and denies any symptoms of leaking of fluid, vaginal bleeding, and/or contractions.  She had a fetal ultrasound that was notable for the following.  There is a single intrauterine gestation in a breech presentation with a heart rate of 149 beats per minute.  The placenta is anterior.  The amniotic fluid index is 14.9 cm.  The composite gestational age is 28w6d.  The estimated fetal weight is at the 53rd percentile.   BPP 8/8. Umbilical artery Doppler studies are normal.  MCA bilateral.  Transabdominall cervical length 4.2 cm.      PERTINENT PHYSICAL EXAMINATION:   /84   Pulse 97   Wt 87.1 kg (192 lb)   LMP 2023   BMI 29.19 kg/m²     Urine dipstick:   Negative for Glucose    Negative for Albumin      A fetal ultrasound assessment was performed today. A report is enclosed for your review.    Assessment & Plan:  29 y.o.  at 28w3d (LMP = 13 WK US) with:    1.   Genetic counseling -- The patient was previously counseled regarding her options for genetic screening and/or diagnostic testing.  Counseling was reviewed.     The patient completed screening with NIPT.  Testing was completed on 10/20/2023.  The fetal fraction was 7.5%.  Results were low risk.     The patient's prenatal record was reviewed.  She had screening for cystic fibrosis that was negative on 10/20/2023.  Screening for spinal muscular atrophy

## 2024-02-20 NOTE — PATIENT INSTRUCTIONS
Weeks 26 to 30 of Your Pregnancy: Care Instructions  You're starting your last trimester. You'll probably feel your baby moving around more. Your back may ache as your body gets used to your baby's size and length. Take care of yourself, and pay attention to what your body needs.  Talk to your doctor about getting the Tdap shot. It will help protect your  against whooping cough (pertussis).   You may have Dennis-Sheldon contractions. They are single or several strong contractions without a pattern. These are practice contractions but not the start of labor.   Be kind to yourself.     Take breaks when you're tired.  Change positions often. Don't sit for too long or stand for too long.  At work, rest during breaks if you can. If you don't get breaks, talk to your doctor about writing a letter to your employer to request them.  Avoid fumes, chemicals, and tobacco smoke.  Be sexual if you want to.     You may be interested in sex, or you may not. Everyone is different.  Sex is okay unless your doctor tells you not to.  Your belly can make it hard to find good positions for sex. [place] and explore.  Watch for signs of  labor.     These signs include:  Menstrual-like cramps. Or you may have pain or pressure in your pelvis that happens in a pattern.  About 6 or more contractions in an hour (even after rest and a glass of water).  A low, dull backache that doesn't go away when you change positions.  An increase or change in vaginal discharge.  Your water breaking.  Know what to do if you think you are having contractions.     Drink 1 or 2 glasses of water.  Lie down on your left side for at least an hour.  While on your side, feel the top of your belly to see if it's tight.  Write down your contractions for an hour. Time how long it is from the start of one contraction to the start of the next.  Call your doctor if you have regular contractions.  Follow-up care is a key part of your treatment and

## 2024-02-26 ENCOUNTER — HOSPITAL ENCOUNTER (OUTPATIENT)
Age: 29
Discharge: HOME | End: 2024-02-26
Payer: COMMERCIAL

## 2024-02-26 DIAGNOSIS — Z3A.27 27 WEEKS GESTATION OF PREGNANCY: ICD-10-CM

## 2024-02-26 DIAGNOSIS — Z87.898 HISTORY OF SYNCOPE: ICD-10-CM

## 2024-02-26 DIAGNOSIS — R80.9 URINE PROTEIN INCREASED: ICD-10-CM

## 2024-02-26 DIAGNOSIS — R79.0 LOW FERRITIN: ICD-10-CM

## 2024-02-26 DIAGNOSIS — O16.2 ELEVATED BLOOD PRESSURE AFFECTING PREGNANCY IN SECOND TRIMESTER, ANTEPARTUM: ICD-10-CM

## 2024-02-26 DIAGNOSIS — Z34.02 ENCOUNTER FOR SUPERVISION OF NORMAL FIRST PREGNANCY IN SECOND TRIMESTER: ICD-10-CM

## 2024-02-26 DIAGNOSIS — R73.09 ABNORMAL GLUCOSE TOLERANCE TEST: ICD-10-CM

## 2024-02-26 DIAGNOSIS — Z3A.28 28 WEEKS GESTATION OF PREGNANCY: ICD-10-CM

## 2024-02-26 DIAGNOSIS — D72.829 LEUKOCYTOSIS, UNSPECIFIED TYPE: ICD-10-CM

## 2024-02-26 LAB
25(OH)D3 SERPL-MCNC: 49.9 NG/ML (ref 30–100)
ALBUMIN SERPL-MCNC: 3.5 G/DL (ref 3.5–5.2)
ALP SERPL-CCNC: 98 U/L (ref 35–104)
ALT SERPL-CCNC: 11 U/L (ref 0–32)
AMOUNT GLUCOSE GIVEN: 100 G
ANION GAP SERPL CALCULATED.3IONS-SCNC: 9 MMOL/L (ref 7–16)
AST SERPL-CCNC: 18 U/L (ref 0–31)
BASOPHILS # BLD: 0.12 K/UL (ref 0–0.2)
BASOPHILS NFR BLD: 1 % (ref 0–2)
BILIRUB SERPL-MCNC: 0.4 MG/DL (ref 0–1.2)
BILIRUB UR QL STRIP: NEGATIVE
BUN SERPL-MCNC: 9 MG/DL (ref 6–20)
CALCIUM SERPL-MCNC: 9 MG/DL (ref 8.6–10.2)
CHLORIDE SERPL-SCNC: 102 MMOL/L (ref 98–107)
CLARITY UR: CLEAR
CO2 SERPL-SCNC: 23 MMOL/L (ref 22–29)
COLLECT TIME, 1HR GLUCOSE: NORMAL
COLLECT TIME, 2HR GLUCOSE: NORMAL
COLLECT TIME, 3HR GLUCOSE: NORMAL
COLLECT TIME, FASTING GLUCOSE: NORMAL
COLOR UR: YELLOW
CREAT SERPL-MCNC: 0.7 MG/DL (ref 0.5–1)
CREAT UR-MCNC: 135.5 MG/DL (ref 29–226)
EOSINOPHIL # BLD: 0.3 K/UL (ref 0.05–0.5)
EOSINOPHILS RELATIVE PERCENT: 2 % (ref 0–6)
ERYTHROCYTE [DISTWIDTH] IN BLOOD BY AUTOMATED COUNT: 13.3 % (ref 11.5–15)
FERRITIN SERPL-MCNC: 21 NG/ML
FOLATE SERPL-MCNC: >20 NG/ML (ref 4.8–24.2)
GFR SERPL CREATININE-BSD FRML MDRD: >60 ML/MIN/1.73M2
GLUCOSE 2H P 100 G GLC PO SERPL-MCNC: 79 MG/DL
GLUCOSE 3H P 100 G GLC PO SERPL-MCNC: 114 MG/DL
GLUCOSE SERPL-MCNC: 81 MG/DL (ref 74–99)
GLUCOSE TOLERANCE TEST 2 HOUR: 123 MG/DL
GLUCOSE TOLERANCE TEST 3 HOUR: 114 MG/DL
GLUCOSE UR STRIP-MCNC: NEGATIVE MG/DL
HBA1C MFR BLD: 4.9 % (ref 4–5.6)
HCT VFR BLD AUTO: 40.1 % (ref 34–48)
HGB BLD-MCNC: 12.4 G/DL (ref 11.5–15.5)
HGB UR QL STRIP.AUTO: NEGATIVE
IMM GRANULOCYTES # BLD AUTO: 0.17 K/UL (ref 0–0.58)
IMM GRANULOCYTES NFR BLD: 1 % (ref 0–5)
KETONES UR STRIP-MCNC: NEGATIVE MG/DL
LDH SERPL-CCNC: 186 U/L (ref 135–214)
LEUKOCYTE ESTERASE UR QL STRIP: NEGATIVE
LYMPHOCYTES NFR BLD: 2.02 K/UL (ref 1.5–4)
LYMPHOCYTES RELATIVE PERCENT: 14 % (ref 20–42)
MAGNESIUM SERPL-MCNC: 2 MG/DL (ref 1.6–2.6)
MCH RBC QN AUTO: 28.7 PG (ref 26–35)
MCHC RBC AUTO-ENTMCNC: 30.9 G/DL (ref 32–34.5)
MCV RBC AUTO: 92.8 FL (ref 80–99.9)
MONOCYTES NFR BLD: 0.64 K/UL (ref 0.1–0.95)
MONOCYTES NFR BLD: 4 % (ref 2–12)
NEUTROPHILS NFR BLD: 78 % (ref 43–80)
NEUTS SEG NFR BLD: 11.51 K/UL (ref 1.8–7.3)
NITRITE UR QL STRIP: NEGATIVE
PH UR STRIP: 7 [PH] (ref 5–9)
PLATELET # BLD AUTO: 352 K/UL (ref 130–450)
PMV BLD AUTO: 9.8 FL (ref 7–12)
POTASSIUM SERPL-SCNC: 4.2 MMOL/L (ref 3.5–5)
PROT SERPL-MCNC: 6.8 G/DL (ref 6.4–8.3)
PROT UR STRIP-MCNC: NEGATIVE MG/DL
RBC # BLD AUTO: 4.32 M/UL (ref 3.5–5.5)
RBC #/AREA URNS HPF: NORMAL /HPF
SODIUM SERPL-SCNC: 134 MMOL/L (ref 132–146)
SP GR UR STRIP: 1.02 (ref 1–1.03)
T4 FREE SERPL-MCNC: 0.9 NG/DL (ref 0.9–1.7)
TOTAL PROTEIN, URINE: 10 MG/DL (ref 0–12)
TSH SERPL DL<=0.05 MIU/L-ACNC: 0.88 UIU/ML (ref 0.27–4.2)
URATE SERPL-MCNC: 5.1 MG/DL (ref 2.4–5.7)
URINE TOTAL PROTEIN CREATININE RATIO: 0.07 (ref 0–0.2)
UROBILINOGEN UR STRIP-ACNC: 0.2 EU/DL (ref 0–1)
VIT B12 SERPL-MCNC: 660 PG/ML (ref 211–946)
WBC #/AREA URNS HPF: NORMAL /HPF
WBC OTHER # BLD: 14.8 K/UL (ref 4.5–11.5)

## 2024-02-26 PROCEDURE — 82951 GLUCOSE TOLERANCE TEST (GTT): CPT

## 2024-02-26 PROCEDURE — 82570 ASSAY OF URINE CREATININE: CPT

## 2024-02-26 PROCEDURE — 85025 COMPLETE CBC W/AUTO DIFF WBC: CPT

## 2024-02-26 PROCEDURE — 87086 URINE CULTURE/COLONY COUNT: CPT

## 2024-02-26 PROCEDURE — 82728 ASSAY OF FERRITIN: CPT

## 2024-02-26 PROCEDURE — 82746 ASSAY OF FOLIC ACID SERUM: CPT

## 2024-02-26 PROCEDURE — 84550 ASSAY OF BLOOD/URIC ACID: CPT

## 2024-02-26 PROCEDURE — 80053 COMPREHEN METABOLIC PANEL: CPT

## 2024-02-26 PROCEDURE — 82607 VITAMIN B-12: CPT

## 2024-02-26 PROCEDURE — 82306 VITAMIN D 25 HYDROXY: CPT

## 2024-02-26 PROCEDURE — 36415 COLL VENOUS BLD VENIPUNCTURE: CPT

## 2024-02-26 PROCEDURE — 82952 GTT-ADDED SAMPLES: CPT

## 2024-02-26 PROCEDURE — 83036 HEMOGLOBIN GLYCOSYLATED A1C: CPT

## 2024-02-26 PROCEDURE — 83735 ASSAY OF MAGNESIUM: CPT

## 2024-02-26 PROCEDURE — 84439 ASSAY OF FREE THYROXINE: CPT

## 2024-02-26 PROCEDURE — 84443 ASSAY THYROID STIM HORMONE: CPT

## 2024-02-26 PROCEDURE — 83615 LACTATE (LD) (LDH) ENZYME: CPT

## 2024-02-26 PROCEDURE — 81001 URINALYSIS AUTO W/SCOPE: CPT

## 2024-02-26 PROCEDURE — 84156 ASSAY OF PROTEIN URINE: CPT

## 2024-02-27 LAB
MICROORGANISM SPEC CULT: ABNORMAL
SPECIMEN DESCRIPTION: ABNORMAL

## 2024-03-07 ENCOUNTER — ROUTINE PRENATAL (OUTPATIENT)
Dept: OBGYN | Age: 29
End: 2024-03-07

## 2024-03-07 VITALS
DIASTOLIC BLOOD PRESSURE: 72 MMHG | WEIGHT: 195.5 LBS | SYSTOLIC BLOOD PRESSURE: 117 MMHG | HEART RATE: 90 BPM | BODY MASS INDEX: 29.73 KG/M2

## 2024-03-07 DIAGNOSIS — D25.1 INTRAMURAL LEIOMYOMA OF UTERUS: ICD-10-CM

## 2024-03-07 DIAGNOSIS — Z3A.30 30 WEEKS GESTATION OF PREGNANCY: ICD-10-CM

## 2024-03-07 DIAGNOSIS — Z34.83 ENCOUNTER FOR SUPERVISION OF OTHER NORMAL PREGNANCY, THIRD TRIMESTER: Primary | ICD-10-CM

## 2024-03-07 NOTE — PROGRESS NOTES
Patient alert and pleasant with no complaints.  Here today for prenatal visit.  Fetal heart tones obtained without difficulty.  Discharge instructions have been discussed with the patient. Patient advised to call our office with any questions or concerns.   Voiced understanding.

## 2024-03-07 NOTE — PROGRESS NOTES
CC: Routine prenatal visit  HPI:  Patient is 30w5d.  Patient Active Problem List    Diagnosis Date Noted    Elevated blood pressure affecting pregnancy in second trimester, antepartum 01/31/2024    Chest pain on breathing 01/31/2024    Low ferritin 01/31/2024    Dizziness 01/05/2024    History of syncope 11/17/2023    Intramural leiomyoma of uterus 11/17/2023    Cyst of right ovary 11/17/2023    Encounter for procreative genetic counseling 11/17/2023    Injury of face     Closed fracture of nasal septum     DNS (deviated nasal septum)     Nasal obstruction     Closed fracture of nasal bones     Pancolitis (HCC) 04/09/2021    Migraine headache 10/31/2018       Subjective: Patient has no complaints or concerns at this time. Patient reports regular fetal movement. Patient denies headaches, contractions, cramping, increased vaginal discharge, leaking of fluid and vaginal bleeding.   No acute concerns   Objective: see prenatal vitals  Abdomen: soft, gravid, non-tender  Patient alert and oriented times three and in no apparent distress  Assessment:   1. Encounter for supervision of other normal pregnancy, third trimester    2. 30 weeks gestation of pregnancy    3. Intramural leiomyoma of uterus      Plan:   - Follow up 2 weeks for RONAL  - Next [facility] appt 3/19/24  I requested the patient return for a follow-up assessment in 2 weeks unless there is a clinical reason for her to return prior to that time. The patient was advised to call if she has any decreased fetal movement, increased vaginal discharge, vaginal bleeding, contractions, abdominal pain, back pain or any new significant symptomatology prior to her next visit. Kick counts reviewed with patient.  Third trimester warning signs reviewed with patient. Labor warning signs reviewed with patient.    All questions and concerns addressed. Patient voices understanding of plan of care.

## 2024-03-19 ENCOUNTER — ROUTINE PRENATAL (OUTPATIENT)
Dept: OBGYN CLINIC | Age: 29
End: 2024-03-19
Payer: COMMERCIAL

## 2024-03-19 ENCOUNTER — ANCILLARY PROCEDURE (OUTPATIENT)
Dept: OBGYN CLINIC | Age: 29
End: 2024-03-19
Payer: COMMERCIAL

## 2024-03-19 VITALS
HEART RATE: 91 BPM | WEIGHT: 198.2 LBS | DIASTOLIC BLOOD PRESSURE: 81 MMHG | BODY MASS INDEX: 30.14 KG/M2 | SYSTOLIC BLOOD PRESSURE: 117 MMHG

## 2024-03-19 DIAGNOSIS — G43.809 OTHER MIGRAINE WITHOUT STATUS MIGRAINOSUS, NOT INTRACTABLE: Chronic | ICD-10-CM

## 2024-03-19 DIAGNOSIS — R79.0 LOW FERRITIN: ICD-10-CM

## 2024-03-19 DIAGNOSIS — Z3A.32 32 WEEKS GESTATION OF PREGNANCY: Primary | ICD-10-CM

## 2024-03-19 DIAGNOSIS — D25.1 INTRAMURAL LEIOMYOMA OF UTERUS: ICD-10-CM

## 2024-03-19 LAB
GLUCOSE URINE, POC: NEGATIVE
PROTEIN UA: NEGATIVE

## 2024-03-19 PROCEDURE — 81002 URINALYSIS NONAUTO W/O SCOPE: CPT | Performed by: OBSTETRICS & GYNECOLOGY

## 2024-03-19 PROCEDURE — G8419 CALC BMI OUT NRM PARAM NOF/U: HCPCS | Performed by: OBSTETRICS & GYNECOLOGY

## 2024-03-19 PROCEDURE — G8427 DOCREV CUR MEDS BY ELIG CLIN: HCPCS | Performed by: OBSTETRICS & GYNECOLOGY

## 2024-03-19 PROCEDURE — 76820 UMBILICAL ARTERY ECHO: CPT | Performed by: OBSTETRICS & GYNECOLOGY

## 2024-03-19 PROCEDURE — 99213 OFFICE O/P EST LOW 20 MIN: CPT | Performed by: OBSTETRICS & GYNECOLOGY

## 2024-03-19 PROCEDURE — 76818 FETAL BIOPHYS PROFILE W/NST: CPT | Performed by: OBSTETRICS & GYNECOLOGY

## 2024-03-19 PROCEDURE — 76821 MIDDLE CEREBRAL ARTERY ECHO: CPT | Performed by: OBSTETRICS & GYNECOLOGY

## 2024-03-19 PROCEDURE — 1036F TOBACCO NON-USER: CPT | Performed by: OBSTETRICS & GYNECOLOGY

## 2024-03-19 PROCEDURE — 99214 OFFICE O/P EST MOD 30 MIN: CPT | Performed by: OBSTETRICS & GYNECOLOGY

## 2024-03-19 PROCEDURE — G8484 FLU IMMUNIZE NO ADMIN: HCPCS | Performed by: OBSTETRICS & GYNECOLOGY

## 2024-03-19 PROCEDURE — 76816 OB US FOLLOW-UP PER FETUS: CPT | Performed by: OBSTETRICS & GYNECOLOGY

## 2024-03-19 RX ORDER — FERROUS SULFATE 325(65) MG
325 TABLET ORAL 2 TIMES DAILY
Qty: 60 TABLET | Refills: 2 | Status: SHIPPED | OUTPATIENT
Start: 2024-03-19

## 2024-03-19 NOTE — PROGRESS NOTES
2024      Dianna Sabillon, DO  [address] [facility]  3rd [place] [address]     RE:  SARA TELLEZ  : 1995   AGE: 29 y.o.    This report has been created using voice recognition software. It may contain errors which are inherent in voice recognition technology.    Dear Dr. Sabillon:      I had the pleasure of meeting with Ms. Tellez for a return consultation.  As you know, Ms. Tellez  is a 29 y.o.  at 32w3d (LMP = 13 WK US) who is being followed by our office for multiple medical issues.      Today, Ms. Tellez reports that she feels well.  She notes good fetal movement and denies any symptoms of leaking of fluid, vaginal bleeding, and/or contractions.  She had a fetal ultrasound that was notable for the following.  There is a single intrauterine gestation in a cephalic presentation with a heart rate of 49 beats per minute.  The placenta is anterior.  The amniotic fluid index is 11.5 cm.  The composite gestational age is 33w6d.  The estimated fetal weight is at the 74th percentile.   BPP 8/8. Umbilical artery Doppler studies are normal.  MCA Dopplers normal.      PERTINENT PHYSICAL EXAMINATION:   /81   Pulse 91   Wt 89.9 kg (198 lb 3.2 oz)   LMP 2023   BMI 30.14 kg/m²     Urine dipstick:   No urine sample      A fetal ultrasound assessment was performed today. A report is enclosed for your review.    Assessment & Plan:  29 y.o.  at 32w3d (LMP = 13 WK US) with:    1.   Genetic counseling -- The patient was previously counseled regarding her options for genetic screening and/or diagnostic testing.  Counseling was reviewed.     The patient completed screening with NIPT.  Testing was completed on 10/20/2023.  The fetal fraction was 7.5%.  Results were low risk.     The patient's prenatal record was reviewed.  She had screening for cystic fibrosis that was negative on 10/20/2023.  Screening for spinal muscular atrophy was also completed on 10/20/2023.  She was noted to have 2

## 2024-03-26 NOTE — PROGRESS NOTES
Chief Complaint   Patient presents with    Chest Pain     Left side chest pain since  yesterday morning. Worst with deep breath.     Sent from Urgent Care     Patient sent from urgent care for work up of chest pain.        
Pt here for follow up ultrasound  Pt denies any bleeding/cramping/lof  Pt states good fetal movement   
also completed on 10/20/2023.  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  Thus, her risk to be a carrier of spinal muscular atrophy was reduced to 1/921.  A sickle cell screen was also noted to be negative.  A formal hemoglobin electrophoresis was not completed.     The patient was previously counseled regarding the recommendation for maternal serum AFP to screen for open neural tube defects.  Risks and benefits of screening were reviewed.  Testing was completed on 11/22/2023.  The maternal serum AFP was normal at 0.64 MOM.     2.  History of syncope -- The patient reported that she has a history of vasovagal syncope outside of pregnancy.  The patient reports that she has had intermittent episodes of presyncope during pregnancy.  She reported that she primarily has experienced symptoms with increased activity.  Symptoms include decreased vision.  She reported symptoms improve with rest.  Additionally, her symptoms have improved since starting an iron supplement.  She denies any associated cardiac symptoms with the episodes.     Today, the patient again reports that her symptoms are stable.  She experiences occasional dizziness.     The patient's H/H was noted be normal at 13.5/40.8, MCV 88.3 on 10/20/2023     At this time, continued monitoring is recommended.  With recurrent or worsening symptoms, additional maternal evaluation is recommended with a repeat CBC, CMP, nutrition panel, and thyroid function studies.  -- Testing was completed on 12/21/2023, results included: H/H 12.3/39, MCV 90.9, platelet count 347,000,Potassium 4.4, creatinine 0.7, calcium 8.8, ALT 9, AST 19, magnesium 2, ferritin 24, folate >20 vitamin B12 150, vitamin D54.6, hemoglobin A1c 4.8%, TSH 1.24, free T40.9, uric acid 3.7, , urinalysis negative, urine culture negative, urine protein creatinine ratio could not be calculated.  -- Recommend repeat testing at 28 weeks gestation.  --Continue iron

## 2024-04-03 ENCOUNTER — ROUTINE PRENATAL (OUTPATIENT)
Dept: OBGYN | Age: 29
End: 2024-04-03

## 2024-04-03 VITALS
DIASTOLIC BLOOD PRESSURE: 75 MMHG | SYSTOLIC BLOOD PRESSURE: 137 MMHG | HEART RATE: 88 BPM | BODY MASS INDEX: 31.02 KG/M2 | WEIGHT: 204 LBS

## 2024-04-03 DIAGNOSIS — Z3A.34 34 WEEKS GESTATION OF PREGNANCY: ICD-10-CM

## 2024-04-03 DIAGNOSIS — Z34.03 ENCOUNTER FOR SUPERVISION OF NORMAL FIRST PREGNANCY IN THIRD TRIMESTER: Primary | ICD-10-CM

## 2024-04-03 LAB
GLUCOSE URINE, POC: NEGATIVE
HCT VFR BLD CALC: 38.2 % (ref 34–48)
HEMOGLOBIN: 11.9 G/DL (ref 11.5–15.5)
MCH RBC QN AUTO: 28.7 PG (ref 26–35)
MCHC RBC AUTO-ENTMCNC: 31.2 G/DL (ref 32–34.5)
MCV RBC AUTO: 92.3 FL (ref 80–99.9)
PDW BLD-RTO: 14.2 % (ref 11.5–15)
PLATELET # BLD: 326 K/UL (ref 130–450)
PMV BLD AUTO: 10.8 FL (ref 7–12)
PROTEIN UA: POSITIVE
RBC # BLD: 4.14 M/UL (ref 3.5–5.5)
WBC # BLD: 12 K/UL (ref 4.5–11.5)

## 2024-04-03 NOTE — PROGRESS NOTES
Patient is here for Routine prenatal visit at  34w4d SLAVA (5/11/2024)   Denies cramping bleeding and fluid leakage.  States her blood pressure varies widely through out the day.  Urine Protein: Trace   Urine Glucose: Negative  Perceives fetal movement.  Fetal Heart Rate: 149  Last Pap: 03/21/2024  Mammogram: N/A  Obtained specimen via left antecubital. Skin intact, no redness or swelling noted at venipuncture site. No complaints of discomfort voiced by patient. Urine specimen also obtained. All specimens labeled and sent to lab.   Discharge instructions have been discussed with the patient. Patient advised to call our office with any questions or concerns.   Voiced understanding.

## 2024-04-03 NOTE — PROGRESS NOTES
SUBJECTIVE:   29 y.o.  female here for routine OB appointment.   Good fetal movement. No lof, vb, ctx.  Previously discussed flu and covid vaccines. Pt declines.   28 week labs reviewed. Failed 1 hr. Passed 3 hr. On iron.   Tdap done. Discussed RSV vaccine.     Pt c/o headache, blurred vision. Pt has been checking bp at home which is labile but normotensive. Pt is hydrating and blurred vision. Pt reports symptoms only when being outside. Discussed mg and tylenol for HA, pt prefers not to take medication. Discussed bp parameters and when to go to hospital.     F/u 2 wks.     OB History    Para Term  AB Living   1 0       0   SAB IAB Ectopic Molar Multiple Live Births                    # Outcome Date GA Lbr Terry/2nd Weight Sex Delivery Anes PTL Lv   1 Current                Past Medical History:   Diagnosis Date    Colitis 2021    enlarged colon     Migraine 2017    Less frequent, only occasional, usually stress related    Migraine headache 10/31/2018    Vasovagal syncopes         Past Surgical History:   Procedure Laterality Date    SINUS SURGERY      Polyps removed not certain site ie right, left or bilateral    TURBINATE RESECTION N/A 2021    CLOSED REDUCTION NASAL BONE FRACTURE, SUBMUCOUSAL RESECTION OF INFERIOR TURBINATES [place] [facility] [place]    WISDOM TOOTH EXTRACTION          Family History   Problem Relation Age of Onset    Preeclampsia Mother         I was 3 weeks early because of this    No Known Problems Father     Breast Cancer Maternal Grandmother     Diabetes Paternal Grandmother         Social History       Tobacco History       Smoking Status  Never      Smokeless Tobacco Use  Never              Alcohol History       Alcohol Use Status  Not Currently Drinks/Week  0 Glasses of wine, 0 Cans of beer, 0 Shots of liquor, 0 Drinks containing 0.5 oz of alcohol per week Comment  couple times a month              Drug Use       Drug Use Status  Never

## 2024-04-04 LAB
ALBUMIN SERPL-MCNC: 3.4 G/DL (ref 3.5–5.2)
ALP BLD-CCNC: 121 U/L (ref 35–104)
ALT SERPL-CCNC: 14 U/L (ref 0–32)
ANION GAP SERPL CALCULATED.3IONS-SCNC: 16 MMOL/L (ref 7–16)
AST SERPL-CCNC: 22 U/L (ref 0–31)
BILIRUB SERPL-MCNC: 0.2 MG/DL (ref 0–1.2)
BUN BLDV-MCNC: 9 MG/DL (ref 6–20)
CALCIUM SERPL-MCNC: 9 MG/DL (ref 8.6–10.2)
CHLORIDE BLD-SCNC: 101 MMOL/L (ref 98–107)
CO2: 18 MMOL/L (ref 22–29)
CREAT SERPL-MCNC: 0.7 MG/DL (ref 0.5–1)
CREATININE URINE: 159 MG/DL (ref 29–226)
GFR SERPL CREATININE-BSD FRML MDRD: >90 ML/MIN/1.73M2
GLUCOSE BLD-MCNC: 80 MG/DL (ref 74–99)
POTASSIUM SERPL-SCNC: 4.2 MMOL/L (ref 3.5–5)
SODIUM BLD-SCNC: 135 MMOL/L (ref 132–146)
TOTAL PROTEIN, URINE: 13 MG/DL (ref 0–12)
TOTAL PROTEIN: 6.5 G/DL (ref 6.4–8.3)
URIC ACID: 5.2 MG/DL (ref 2.4–5.7)
URINE TOTAL PROTEIN CREATININE RATIO: 0.08 (ref 0–0.2)

## 2024-04-10 ENCOUNTER — ROUTINE PRENATAL (OUTPATIENT)
Dept: OBGYN CLINIC | Age: 29
End: 2024-04-10
Payer: COMMERCIAL

## 2024-04-10 ENCOUNTER — ANCILLARY PROCEDURE (OUTPATIENT)
Dept: OBGYN CLINIC | Age: 29
End: 2024-04-10
Payer: COMMERCIAL

## 2024-04-10 ENCOUNTER — HOSPITAL ENCOUNTER (OUTPATIENT)
Age: 29
Discharge: HOME | End: 2024-04-10
Payer: COMMERCIAL

## 2024-04-10 VITALS
HEIGHT: 68 IN | BODY MASS INDEX: 31.22 KG/M2 | DIASTOLIC BLOOD PRESSURE: 84 MMHG | WEIGHT: 206 LBS | SYSTOLIC BLOOD PRESSURE: 126 MMHG | HEART RATE: 93 BPM

## 2024-04-10 DIAGNOSIS — O35.HXX0 SHORT FEMUR OF FETUS ON PRENATAL ULTRASOUND: ICD-10-CM

## 2024-04-10 DIAGNOSIS — G43.809 OTHER MIGRAINE WITHOUT STATUS MIGRAINOSUS, NOT INTRACTABLE: Chronic | ICD-10-CM

## 2024-04-10 DIAGNOSIS — R80.9 URINE PROTEIN INCREASED: ICD-10-CM

## 2024-04-10 DIAGNOSIS — D72.829 LEUKOCYTOSIS, UNSPECIFIED TYPE: ICD-10-CM

## 2024-04-10 DIAGNOSIS — O16.2 ELEVATED BLOOD PRESSURE AFFECTING PREGNANCY IN SECOND TRIMESTER, ANTEPARTUM: ICD-10-CM

## 2024-04-10 DIAGNOSIS — O16.2 ELEVATED BLOOD PRESSURE AFFECTING PREGNANCY IN SECOND TRIMESTER, ANTEPARTUM: Primary | ICD-10-CM

## 2024-04-10 DIAGNOSIS — O13.3 GESTATIONAL HYPERTENSION, THIRD TRIMESTER: ICD-10-CM

## 2024-04-10 DIAGNOSIS — R79.0 LOW FERRITIN: ICD-10-CM

## 2024-04-10 LAB
25(OH)D3 SERPL-MCNC: 48.2 NG/ML (ref 30–100)
ALBUMIN SERPL-MCNC: 3.5 G/DL (ref 3.5–5.2)
ALP SERPL-CCNC: 137 U/L (ref 35–104)
ALT SERPL-CCNC: 12 U/L (ref 0–32)
ANION GAP SERPL CALCULATED.3IONS-SCNC: 7 MMOL/L (ref 7–16)
AST SERPL-CCNC: 22 U/L (ref 0–31)
BACTERIA URNS QL MICRO: ABNORMAL
BASOPHILS # BLD: 0.14 K/UL (ref 0–0.2)
BASOPHILS NFR BLD: 1 % (ref 0–2)
BILIRUB SERPL-MCNC: 0.3 MG/DL (ref 0–1.2)
BILIRUB UR QL STRIP: NEGATIVE
BUN SERPL-MCNC: 8 MG/DL (ref 6–20)
CALCIUM SERPL-MCNC: 8.9 MG/DL (ref 8.6–10.2)
CHLORIDE SERPL-SCNC: 103 MMOL/L (ref 98–107)
CLARITY UR: CLEAR
CO2 SERPL-SCNC: 25 MMOL/L (ref 22–29)
COLOR UR: YELLOW
CREAT SERPL-MCNC: 0.9 MG/DL (ref 0.5–1)
CREAT UR-MCNC: 40.1 MG/DL (ref 29–226)
EOSINOPHIL # BLD: 0.41 K/UL (ref 0.05–0.5)
EOSINOPHILS RELATIVE PERCENT: 3 % (ref 0–6)
ERYTHROCYTE [DISTWIDTH] IN BLOOD BY AUTOMATED COUNT: 14 % (ref 11.5–15)
FERRITIN SERPL-MCNC: 26 NG/ML
FOLATE SERPL-MCNC: 17.7 NG/ML (ref 4.8–24.2)
GFR SERPL CREATININE-BSD FRML MDRD: >90 ML/MIN/1.73M2
GLUCOSE SERPL-MCNC: 78 MG/DL (ref 74–99)
GLUCOSE UR STRIP-MCNC: NEGATIVE MG/DL
GLUCOSE URINE, POC: NEGATIVE
HCT VFR BLD AUTO: 40.6 % (ref 34–48)
HGB BLD-MCNC: 12.7 G/DL (ref 11.5–15.5)
HGB UR QL STRIP.AUTO: NEGATIVE
IMM GRANULOCYTES # BLD AUTO: 0.16 K/UL (ref 0–0.58)
IMM GRANULOCYTES NFR BLD: 1 % (ref 0–5)
KETONES UR STRIP-MCNC: NEGATIVE MG/DL
LDH SERPL-CCNC: 189 U/L (ref 135–214)
LEUKOCYTE ESTERASE UR QL STRIP: NEGATIVE
LYMPHOCYTES NFR BLD: 2.3 K/UL (ref 1.5–4)
LYMPHOCYTES RELATIVE PERCENT: 17 % (ref 20–42)
MAGNESIUM SERPL-MCNC: 1.9 MG/DL (ref 1.6–2.6)
MCH RBC QN AUTO: 28.5 PG (ref 26–35)
MCHC RBC AUTO-ENTMCNC: 31.3 G/DL (ref 32–34.5)
MCV RBC AUTO: 91 FL (ref 80–99.9)
MONOCYTES NFR BLD: 0.75 K/UL (ref 0.1–0.95)
MONOCYTES NFR BLD: 5 % (ref 2–12)
NEUTROPHILS NFR BLD: 73 % (ref 43–80)
NEUTS SEG NFR BLD: 10.2 K/UL (ref 1.8–7.3)
NITRITE UR QL STRIP: NEGATIVE
PH UR STRIP: 7.5 [PH] (ref 5–9)
PLATELET # BLD AUTO: 345 K/UL (ref 130–450)
PMV BLD AUTO: 10 FL (ref 7–12)
POTASSIUM SERPL-SCNC: 4.1 MMOL/L (ref 3.5–5)
PROT SERPL-MCNC: 7 G/DL (ref 6.4–8.3)
PROT UR STRIP-MCNC: NEGATIVE MG/DL
PROTEIN UA: POSITIVE
RBC # BLD AUTO: 4.46 M/UL (ref 3.5–5.5)
RBC #/AREA URNS HPF: ABNORMAL /HPF
SODIUM SERPL-SCNC: 135 MMOL/L (ref 132–146)
SP GR UR STRIP: 1.01 (ref 1–1.03)
TOTAL PROTEIN, URINE: <4 MG/DL (ref 0–12)
URATE SERPL-MCNC: 5.3 MG/DL (ref 2.4–5.7)
URINE TOTAL PROTEIN CREATININE RATIO: NORMAL (ref 0–0.2)
UROBILINOGEN UR STRIP-ACNC: 0.2 EU/DL (ref 0–1)
VIT B12 SERPL-MCNC: 445 PG/ML (ref 211–946)
WBC #/AREA URNS HPF: ABNORMAL /HPF
WBC OTHER # BLD: 14 K/UL (ref 4.5–11.5)

## 2024-04-10 PROCEDURE — 82746 ASSAY OF FOLIC ACID SERUM: CPT

## 2024-04-10 PROCEDURE — 80053 COMPREHEN METABOLIC PANEL: CPT

## 2024-04-10 PROCEDURE — 82570 ASSAY OF URINE CREATININE: CPT

## 2024-04-10 PROCEDURE — 81002 URINALYSIS NONAUTO W/O SCOPE: CPT | Performed by: OBSTETRICS & GYNECOLOGY

## 2024-04-10 PROCEDURE — 76816 OB US FOLLOW-UP PER FETUS: CPT | Performed by: OBSTETRICS & GYNECOLOGY

## 2024-04-10 PROCEDURE — 82306 VITAMIN D 25 HYDROXY: CPT

## 2024-04-10 PROCEDURE — 82607 VITAMIN B-12: CPT

## 2024-04-10 PROCEDURE — 82728 ASSAY OF FERRITIN: CPT

## 2024-04-10 PROCEDURE — 83615 LACTATE (LD) (LDH) ENZYME: CPT

## 2024-04-10 PROCEDURE — 36415 COLL VENOUS BLD VENIPUNCTURE: CPT

## 2024-04-10 PROCEDURE — 85025 COMPLETE CBC W/AUTO DIFF WBC: CPT

## 2024-04-10 PROCEDURE — 76820 UMBILICAL ARTERY ECHO: CPT | Performed by: OBSTETRICS & GYNECOLOGY

## 2024-04-10 PROCEDURE — 84156 ASSAY OF PROTEIN URINE: CPT

## 2024-04-10 PROCEDURE — 83735 ASSAY OF MAGNESIUM: CPT

## 2024-04-10 PROCEDURE — 76821 MIDDLE CEREBRAL ARTERY ECHO: CPT | Performed by: OBSTETRICS & GYNECOLOGY

## 2024-04-10 PROCEDURE — 76818 FETAL BIOPHYS PROFILE W/NST: CPT | Performed by: OBSTETRICS & GYNECOLOGY

## 2024-04-10 PROCEDURE — 81001 URINALYSIS AUTO W/SCOPE: CPT

## 2024-04-10 PROCEDURE — 99213 OFFICE O/P EST LOW 20 MIN: CPT | Performed by: OBSTETRICS & GYNECOLOGY

## 2024-04-10 PROCEDURE — 87086 URINE CULTURE/COLONY COUNT: CPT

## 2024-04-10 PROCEDURE — 84550 ASSAY OF BLOOD/URIC ACID: CPT

## 2024-04-10 NOTE — PROGRESS NOTES
Lex Tellez presents to office today for follow up.  Patient denies bleeding, LOF, or cramping.   Positive fetal movement.    Unable to void.  
test  --Preeclampsia labs should be checked weekly and prn symptoms  --In the absence of any additional complications, delivery can be planned for 37-39 weeks' gestation  --Delivery should be considered at 37 to 39 weeks gestation, or sooner with any additional complications.  Delivery timing will be readdressed as the patient's pregnancy progresses.  Delivery should be considered sooner with any nonreassuring fetal testing, persistent severe gestational hypertension, the need for  blood pressure medication, and/or the development of preeclampsia.  --The patient was scheduled for follow-up in the maternal-fetal medicine office.       13. Family history of preeclampsia -- The patient previously reported that her mother had preeclampsia while pregnant with the patient.     Counseling was previously provided.  The patient was counseled that a family history of preeclampsia in a first-degree relative is considered a risk factor for preeclampsia.  Precautions were reviewed. She was counseled regarding the potential utility of LDASA in reducing her risk for preeclampsia.  The risks and benefits were reviewed.   She wants to defer treatment at this time.        14. Elevated Glucola, normal 3-hour OGTT -- The patient's Glucola was elevated at 143 on 2/13/24.  She is scheduled for follow up testing. Instructions were reviewed.   --The patient completed a 3-hour OGTT on 2/26, results included: 79, 114, 123, 114.  -- A hemoglobin A1c was normal at 4.9% on 2/26.     15. Proteinuria -- The patient was noted to have trace protein on a urine dip at 35 weeks 4 days. She was normotensive with a blood pressure of 126/84 and she denied any signs or symptoms of preeclampsia. She denied any signs or symptoms of a urinary tract infection.  She was given orders to have a urine culture and urine P/C ratio. If any abnormalities are detected, she will be contacted with results and follow-up recommendations. Preeclampsia precautions were

## 2024-04-11 LAB
MICROORGANISM SPEC CULT: ABNORMAL
MICROORGANISM SPEC CULT: ABNORMAL
SPECIMEN DESCRIPTION: ABNORMAL

## 2024-04-15 DIAGNOSIS — Z3A.32 32 WEEKS GESTATION OF PREGNANCY: ICD-10-CM

## 2024-04-15 DIAGNOSIS — R79.0 LOW FERRITIN: ICD-10-CM

## 2024-04-15 RX ORDER — FERROUS SULFATE 325(65) MG
TABLET ORAL
Qty: 180 TABLET | Refills: 1 | OUTPATIENT
Start: 2024-04-15

## 2024-04-17 ENCOUNTER — ANCILLARY PROCEDURE (OUTPATIENT)
Dept: OBGYN CLINIC | Age: 29
End: 2024-04-17
Payer: COMMERCIAL

## 2024-04-17 ENCOUNTER — ROUTINE PRENATAL (OUTPATIENT)
Dept: OBGYN CLINIC | Age: 29
End: 2024-04-17
Payer: COMMERCIAL

## 2024-04-17 VITALS
SYSTOLIC BLOOD PRESSURE: 119 MMHG | HEIGHT: 68 IN | OXYGEN SATURATION: 97 % | WEIGHT: 206 LBS | HEART RATE: 88 BPM | DIASTOLIC BLOOD PRESSURE: 83 MMHG | BODY MASS INDEX: 31.22 KG/M2

## 2024-04-17 DIAGNOSIS — O13.3 GESTATIONAL HYPERTENSION, THIRD TRIMESTER: ICD-10-CM

## 2024-04-17 DIAGNOSIS — O16.2 ELEVATED BLOOD PRESSURE AFFECTING PREGNANCY IN SECOND TRIMESTER, ANTEPARTUM: Primary | ICD-10-CM

## 2024-04-17 DIAGNOSIS — D25.1 INTRAMURAL LEIOMYOMA OF UTERUS: ICD-10-CM

## 2024-04-17 LAB
GLUCOSE URINE, POC: NORMAL
PROTEIN UA: NEGATIVE

## 2024-04-17 PROCEDURE — 76820 UMBILICAL ARTERY ECHO: CPT | Performed by: OBSTETRICS & GYNECOLOGY

## 2024-04-17 PROCEDURE — 81002 URINALYSIS NONAUTO W/O SCOPE: CPT | Performed by: OBSTETRICS & GYNECOLOGY

## 2024-04-17 PROCEDURE — 76821 MIDDLE CEREBRAL ARTERY ECHO: CPT | Performed by: OBSTETRICS & GYNECOLOGY

## 2024-04-17 PROCEDURE — 99213 OFFICE O/P EST LOW 20 MIN: CPT | Performed by: OBSTETRICS & GYNECOLOGY

## 2024-04-17 PROCEDURE — 76815 OB US LIMITED FETUS(S): CPT | Performed by: OBSTETRICS & GYNECOLOGY

## 2024-04-17 PROCEDURE — 76818 FETAL BIOPHYS PROFILE W/NST: CPT | Performed by: OBSTETRICS & GYNECOLOGY

## 2024-04-17 RX ORDER — ASPIRIN 81 MG/1
81 TABLET, CHEWABLE ORAL DAILY
Qty: 30 TABLET | Refills: 2 | Status: SHIPPED | OUTPATIENT
Start: 2024-04-17

## 2024-04-17 NOTE — PATIENT INSTRUCTIONS
Please arrive for your scheduled appointment at least 15 minutes early with your actual insurance card+ a photo ID. Also if you need any refills ordered or have questions, it may take up 48 hours to reply. Please allow ample time for your refills. Call me when you use last refill. Thank you for your cooperation. You might be having an NST at your next appt. Please eat a large snack or breakfast before coming to office. Thank youCall your primary obstetrician with bleeding, leaking of fluid, abdominal tenderness, headache, blurry vision, epigastric pain and increased urinary frequency.Any questions contact Manju at [phone].If you are experiencing an emergency and need immediate help, call 911 or go to go emergency room or labor and delivery. Do kick counts after dinner. Call your primary obstetrician if less than 10 kicks in 2 hours after dinner.     Call your primary obstetrician with bleeding, leaking of fluid, abdominal tenderness, headache, blurry vision, epigastric pain and increased urinary frequency.if you are sick, not feeling well or have an infectious process going on please reschedule your appointment by calling [phone]. Also if any family members are not feeling well, please do not bring them to your appointment. We appreciate your cooperation. We are doing this in order to protect our pregnant mothers+ their babies.if you are sick, not feeling well or have an infectious process going on please reschedule your appointment by calling [phone]. Also if any family members are not feeling well, please do not bring them to your appointment. We appreciate your cooperation. We are doing this in order to protect our pregnant mothers+ their babies.

## 2024-04-19 ENCOUNTER — ROUTINE PRENATAL (OUTPATIENT)
Dept: OBGYN CLINIC | Age: 29
End: 2024-04-19
Payer: COMMERCIAL

## 2024-04-19 VITALS
DIASTOLIC BLOOD PRESSURE: 84 MMHG | HEART RATE: 100 BPM | BODY MASS INDEX: 31.72 KG/M2 | SYSTOLIC BLOOD PRESSURE: 122 MMHG | WEIGHT: 208.6 LBS

## 2024-04-19 DIAGNOSIS — Z34.93 ENCOUNTER FOR SUPERVISION OF NORMAL PREGNANCY IN THIRD TRIMESTER, UNSPECIFIED GRAVIDITY: Primary | ICD-10-CM

## 2024-04-19 DIAGNOSIS — O16.2 ELEVATED BLOOD PRESSURE AFFECTING PREGNANCY IN SECOND TRIMESTER, ANTEPARTUM: ICD-10-CM

## 2024-04-19 DIAGNOSIS — O13.3 GESTATIONAL HYPERTENSION, THIRD TRIMESTER: ICD-10-CM

## 2024-04-19 LAB
GLUCOSE URINE, POC: NEGATIVE
PROTEIN UA: POSITIVE

## 2024-04-19 PROCEDURE — 59025 FETAL NON-STRESS TEST: CPT | Performed by: OBSTETRICS & GYNECOLOGY

## 2024-04-19 PROCEDURE — 81002 URINALYSIS NONAUTO W/O SCOPE: CPT | Performed by: OBSTETRICS & GYNECOLOGY

## 2024-04-19 PROCEDURE — 99213 OFFICE O/P EST LOW 20 MIN: CPT | Performed by: OBSTETRICS & GYNECOLOGY

## 2024-04-19 NOTE — PROGRESS NOTES
NON STRESS TEST INTERPRETATION    24    RE:  Lex Tellez   : 1995   AGE: 29 y.o.    GESTATIONAL AGE:  36w6d    DIAGNOSIS:   Elevated blood pressure, gestational hypertension    INDICATION:  Same as above    TIME ON:  08      TIME OFF:  08      RESULT:   REACTIVE      FHR Baseline Rate:   145 bpm    PERIODIC CHANGES:    Accelerations present, variability moderate, no decelerations noted    COMMENTS:      She is to continue having NST's every 3-4 days, and BPP with umbilical artery doppler studies once per week        Lulu Brandon MD, FACOG

## 2024-04-19 NOTE — PROGRESS NOTES
Patient is here for nst. Patient denies any vaginal bleeding, leakage of fluid or cramping. Patient has good fetal movement.

## 2024-04-20 PROBLEM — O35.HXX0 SHORT FEMUR OF FETUS ON PRENATAL ULTRASOUND: Status: ACTIVE | Noted: 2024-04-20

## 2024-04-20 PROBLEM — R80.9 URINE PROTEIN INCREASED: Status: ACTIVE | Noted: 2024-04-20

## 2024-04-24 ENCOUNTER — ROUTINE PRENATAL (OUTPATIENT)
Dept: OBGYN | Age: 29
End: 2024-04-24
Payer: COMMERCIAL

## 2024-04-24 VITALS
WEIGHT: 209 LBS | DIASTOLIC BLOOD PRESSURE: 70 MMHG | SYSTOLIC BLOOD PRESSURE: 126 MMHG | BODY MASS INDEX: 31.78 KG/M2 | HEART RATE: 82 BPM

## 2024-04-24 DIAGNOSIS — Z34.03 ENCOUNTER FOR SUPERVISION OF NORMAL FIRST PREGNANCY IN THIRD TRIMESTER: Primary | ICD-10-CM

## 2024-04-24 DIAGNOSIS — O13.3 GESTATIONAL HYPERTENSION, THIRD TRIMESTER: ICD-10-CM

## 2024-04-24 DIAGNOSIS — Z3A.37 37 WEEKS GESTATION OF PREGNANCY: ICD-10-CM

## 2024-04-24 LAB
ALBUMIN: 3.6 G/DL (ref 3.5–5.2)
ALP BLD-CCNC: 146 U/L (ref 35–104)
ALT SERPL-CCNC: 14 U/L (ref 0–32)
ANION GAP SERPL CALCULATED.3IONS-SCNC: 14 MMOL/L (ref 7–16)
AST SERPL-CCNC: 23 U/L (ref 0–31)
BILIRUB SERPL-MCNC: 0.3 MG/DL (ref 0–1.2)
BUN BLDV-MCNC: 9 MG/DL (ref 6–20)
CALCIUM SERPL-MCNC: 9.1 MG/DL (ref 8.6–10.2)
CHLORIDE BLD-SCNC: 105 MMOL/L (ref 98–107)
CO2: 19 MMOL/L (ref 22–29)
CREAT SERPL-MCNC: 0.8 MG/DL (ref 0.5–1)
GFR SERPL CREATININE-BSD FRML MDRD: >90 ML/MIN/1.73M2
GLUCOSE BLD-MCNC: 113 MG/DL (ref 74–99)
GLUCOSE URINE, POC: NORMAL
HCT VFR BLD CALC: 40.1 % (ref 34–48)
HEMOGLOBIN: 12.6 G/DL (ref 11.5–15.5)
MCH RBC QN AUTO: 28.9 PG (ref 26–35)
MCHC RBC AUTO-ENTMCNC: 31.4 G/DL (ref 32–34.5)
MCV RBC AUTO: 92 FL (ref 80–99.9)
PDW BLD-RTO: 14.3 % (ref 11.5–15)
PLATELET # BLD: 334 K/UL (ref 130–450)
PMV BLD AUTO: 11.3 FL (ref 7–12)
POTASSIUM SERPL-SCNC: 4.1 MMOL/L (ref 3.5–5)
PROTEIN UA: POSITIVE
RBC # BLD: 4.36 M/UL (ref 3.5–5.5)
SODIUM BLD-SCNC: 138 MMOL/L (ref 132–146)
TOTAL PROTEIN: 6.6 G/DL (ref 6.4–8.3)
URIC ACID: 5.3 MG/DL (ref 2.4–5.7)
WBC # BLD: 11.5 K/UL (ref 4.5–11.5)

## 2024-04-24 PROCEDURE — 81002 URINALYSIS NONAUTO W/O SCOPE: CPT | Performed by: OBSTETRICS & GYNECOLOGY

## 2024-04-24 PROCEDURE — 0502F SUBSEQUENT PRENATAL CARE: CPT | Performed by: OBSTETRICS & GYNECOLOGY

## 2024-04-24 PROCEDURE — 36415 COLL VENOUS BLD VENIPUNCTURE: CPT | Performed by: OBSTETRICS & GYNECOLOGY

## 2024-04-24 NOTE — PROGRESS NOTES
SUBJECTIVE:   29 y.o.  female here for routine OB appointment.   Good fetal movement. No lof, vb, ctx.  Previously discussed flu and covid vaccines. Pt declines.   Tdap done. Discussed RSV vaccine.     Pt checking bp  at home, some elevations 148/91, 147/83. Admits occasional headaches.   Recommend induction of labor due to gestational HTN. Pt declines induction. Discussed concern for significant morbidity and mortality to both her and baby. Strict preeclampsia precautions.  BP parameters given. Pt has appointment with [facility] Friday.     OB History    Para Term  AB Living   1 0       0   SAB IAB Ectopic Molar Multiple Live Births                    # Outcome Date GA Lbr Terry/2nd Weight Sex Delivery Anes PTL Lv   1 Current                Past Medical History:   Diagnosis Date    Colitis 2021    enlarged colon     Migraine     Less frequent, only occasional, usually stress related    Migraine headache 10/31/2018    Vasovagal syncopes         Past Surgical History:   Procedure Laterality Date    SINUS SURGERY      Polyps removed not certain site ie right, left or bilateral    TURBINATE RESECTION N/A 2021    CLOSED REDUCTION NASAL BONE FRACTURE, SUBMUCOUSAL RESECTION OF INFERIOR TURBINATES performed by Jacob Upton [place] [facility] [place]    WISDOM TOOTH EXTRACTION          Family History   Problem Relation Age of Onset    Preeclampsia Mother         I was 3 weeks early because of this    No Known Problems Father     Breast Cancer Maternal Grandmother     Diabetes Paternal Grandmother         Social History       Tobacco History       Smoking Status  Never      Smokeless Tobacco Use  Never              Alcohol History       Alcohol Use Status  Not Currently Drinks/Week  0 Glasses of wine, 0 Cans of beer, 0 Shots of liquor, 0 Drinks containing 0.5 oz of alcohol per week Comment  couple times a month              Drug Use       Drug Use Status  Never              Sexual Activity       Sexually

## 2024-04-24 NOTE — PROGRESS NOTES
Pt denies any vb no lof or ctxs  Pt states good fetal movement  GBS culture done and sent.  Return in one week.

## 2024-04-25 NOTE — PROGRESS NOTES
Patient here for ultrasound and NST  No complaints  +FM noted   
patient completed a 3-hour OGTT on 2/26, results included: 79, 114, 123, 114.  -- A hemoglobin A1c was normal at 4.9% on 2/26.     15. Proteinuria -- The patient was noted to have trace protein on a urine dip at 35 weeks 4 days. She was normotensive with a blood pressure of 126/84 and she denied any signs or symptoms of preeclampsia. She denied any signs or symptoms of a urinary tract infection.  She was given orders to have a urine culture and urine P/C ratio.     Testing was completed on 4/10/2024, results included: Urinalysis +2 bacteria, urine culture mixed, urine protein creatinine ratio could not be calculated.    Preeclampsia precautions were reviewed with the patient.     16. Short femur length -- The ultrasound from 35 weeks 4 days was reviewed with patient.  The femur length was at the 3rd percentile.  The femur length to abdominal circumference ratio was normal at 0.2. The fetal long bones appeared normal. There was no bowing or evidence of fracture.  Overall fetal growth was appropriate for the gestational age at 6 pounds 4 ounces, 71st percentile.     The patient was counseled that at this time, the differential diagnosis for the short femur length included constitutional growth, intrauterine growth restriction, and/or a skeletal dysplasia.  The overall estimate of fetal weight was normal.  The patient was counseled to monitor fetal kick counts daily starting at 28 weeks gestation.  Instructions were reviewed.      She is scheduled for twice-weekly fetal testing until delivery.     Fetal growth should be reevaluated on/after 37 weeks 4 days.     Generally, the concern for a skeletal dysplasia increases if the femur length more than 5 mm less than 2 standard deviations below the mean gestational age, the femur to foot ratio is less than 0.9, and/or the femur the abdominal circumference ratio is less than 0.16. If the long bones remain short but there is positive interval growth, then the more likely

## 2024-04-26 ENCOUNTER — ANCILLARY PROCEDURE (OUTPATIENT)
Dept: OBGYN CLINIC | Age: 29
End: 2024-04-26
Payer: COMMERCIAL

## 2024-04-26 ENCOUNTER — ROUTINE PRENATAL (OUTPATIENT)
Dept: OBGYN CLINIC | Age: 29
End: 2024-04-26
Payer: COMMERCIAL

## 2024-04-26 VITALS
OXYGEN SATURATION: 97 % | DIASTOLIC BLOOD PRESSURE: 89 MMHG | SYSTOLIC BLOOD PRESSURE: 129 MMHG | HEIGHT: 68 IN | BODY MASS INDEX: 31.83 KG/M2 | HEART RATE: 112 BPM | WEIGHT: 210 LBS

## 2024-04-26 DIAGNOSIS — Z3A.37 37 WEEKS GESTATION OF PREGNANCY: Primary | ICD-10-CM

## 2024-04-26 DIAGNOSIS — O13.3 GESTATIONAL HYPERTENSION, THIRD TRIMESTER: ICD-10-CM

## 2024-04-26 DIAGNOSIS — Z87.898 HISTORY OF SYNCOPE: ICD-10-CM

## 2024-04-26 DIAGNOSIS — R80.9 URINE PROTEIN INCREASED: ICD-10-CM

## 2024-04-26 DIAGNOSIS — R79.0 LOW FERRITIN: ICD-10-CM

## 2024-04-26 DIAGNOSIS — O35.HXX0 SHORT FEMUR OF FETUS ON PRENATAL ULTRASOUND: ICD-10-CM

## 2024-04-26 LAB
GLUCOSE URINE, POC: NEGATIVE
PROTEIN UA: NEGATIVE

## 2024-04-26 PROCEDURE — 81002 URINALYSIS NONAUTO W/O SCOPE: CPT | Performed by: OBSTETRICS & GYNECOLOGY

## 2024-04-26 PROCEDURE — 99213 OFFICE O/P EST LOW 20 MIN: CPT | Performed by: OBSTETRICS & GYNECOLOGY

## 2024-04-26 PROCEDURE — 76815 OB US LIMITED FETUS(S): CPT | Performed by: OBSTETRICS & GYNECOLOGY

## 2024-04-26 PROCEDURE — 76821 MIDDLE CEREBRAL ARTERY ECHO: CPT | Performed by: OBSTETRICS & GYNECOLOGY

## 2024-04-26 PROCEDURE — 76820 UMBILICAL ARTERY ECHO: CPT | Performed by: OBSTETRICS & GYNECOLOGY

## 2024-04-26 PROCEDURE — 76818 FETAL BIOPHYS PROFILE W/NST: CPT | Performed by: OBSTETRICS & GYNECOLOGY

## 2024-04-26 NOTE — PROGRESS NOTES
Patient here for prenatal ultrasound and NST  Complaining of some cramping mostly in back since yesterday  +FM noted   
at 143 on 2/13/24.  She is scheduled for follow up testing. Instructions were reviewed.   --The patient completed a 3-hour OGTT on 2/26, results included: 79, 114, 123, 114.  -- A hemoglobin A1c was normal at 4.9% on 2/26.     15. Proteinuria -- The patient was noted to have trace protein on a urine dip at 35 weeks 4 days. She was normotensive with a blood pressure of 126/84 and she denied any signs or symptoms of preeclampsia. She denied any signs or symptoms of a urinary tract infection.  She was given orders to have a urine culture and urine P/C ratio.      Testing was completed on 4/10/2024, results included: Urinalysis +2 bacteria, urine culture mixed, urine protein creatinine ratio could not be calculated.    The patient's urine was negative for protein today.     Preeclampsia precautions were reviewed with the patient.     16. Short femur length -- The ultrasound from 35 weeks 4 days was reviewed with patient.  The femur length was at the 3rd percentile.  The femur length to abdominal circumference ratio was normal at 0.2. The fetal long bones appeared normal. There was no bowing or evidence of fracture.  Overall fetal growth was appropriate for the gestational age at 6 pounds 4 ounces, 71st percentile.     The patient was counseled that at this time, the differential diagnosis for the short femur length included constitutional growth, intrauterine growth restriction, and/or a skeletal dysplasia.  The overall estimate of fetal weight was normal.  The patient was counseled to monitor fetal kick counts daily starting at 28 weeks gestation.  Instructions were reviewed.      She is scheduled for twice-weekly fetal testing until delivery.     Fetal growth should be reevaluated in 1 week.     Generally, the concern for a skeletal dysplasia increases if the femur length more than 5 mm less than 2 standard deviations below the mean gestational age, the femur to foot ratio is less than 0.9, and/or the femur the

## 2024-04-26 NOTE — PATIENT INSTRUCTIONS
Please arrive for your scheduled appointment at least 15 minutes early with your actual insurance card+ a photo ID. Also if you need any refills ordered or have questions, it may take up 48 hours to reply. Please allow ample time for your refills. Call me when you use last refill. Thank you for your cooperation. You might be having an NST at your next appt. Please eat a large snack or breakfast before coming to office. Thank youCall your primary obstetrician with bleeding, leaking of fluid, abdominal tenderness, headache, blurry vision, epigastric pain and increased urinary frequency.Any questions contact Manju at [phone].If you are experiencing an emergency and need immediate help, call 911 or go to go emergency room or labor and delivery. Do kick counts after dinner. Call your primary obstetrician if less than 10 kicks in 2 hours after dinner.     Call your primary obstetrician with bleeding, leaking of fluid, abdominal tenderness, headache, blurry vision, epigastric pain and increased urinary frequency.if you are sick, not feeling well or have an infectious process going on please reschedule your appointment by calling [phone]. Also if any family members are not feeling well, please do not bring them to your appointment. We appreciate your cooperation. We are doing this in order to protect our pregnant mothers+ their babies.if you are sick, not feeling well or have an infectious process going on please reschedule your appointment by calling [phone]. Also if any family members are not feeling well, please do not bring them to your appointment. We appreciate your cooperation. We are doing this in order to protect our pregnant mothers+ their babies.

## 2024-04-28 LAB
CULTURE: NORMAL
SPECIMEN DESCRIPTION: NORMAL

## 2024-04-30 ENCOUNTER — ANCILLARY PROCEDURE (OUTPATIENT)
Dept: OBGYN CLINIC | Age: 29
End: 2024-04-30
Payer: COMMERCIAL

## 2024-04-30 ENCOUNTER — ROUTINE PRENATAL (OUTPATIENT)
Dept: OBGYN CLINIC | Age: 29
End: 2024-04-30
Payer: COMMERCIAL

## 2024-04-30 VITALS
DIASTOLIC BLOOD PRESSURE: 85 MMHG | WEIGHT: 210.9 LBS | SYSTOLIC BLOOD PRESSURE: 130 MMHG | BODY MASS INDEX: 32.07 KG/M2 | HEART RATE: 103 BPM

## 2024-04-30 DIAGNOSIS — O35.HXX0 SHORT FEMUR OF FETUS ON PRENATAL ULTRASOUND: ICD-10-CM

## 2024-04-30 DIAGNOSIS — O16.2 ELEVATED BLOOD PRESSURE AFFECTING PREGNANCY IN SECOND TRIMESTER, ANTEPARTUM: ICD-10-CM

## 2024-04-30 DIAGNOSIS — Z3A.38 38 WEEKS GESTATION OF PREGNANCY: Primary | ICD-10-CM

## 2024-04-30 LAB
GLUCOSE URINE, POC: NEGATIVE
PROTEIN UA: POSITIVE

## 2024-04-30 PROCEDURE — 76818 FETAL BIOPHYS PROFILE W/NST: CPT | Performed by: OBSTETRICS & GYNECOLOGY

## 2024-04-30 PROCEDURE — 76820 UMBILICAL ARTERY ECHO: CPT | Performed by: OBSTETRICS & GYNECOLOGY

## 2024-04-30 PROCEDURE — 99213 OFFICE O/P EST LOW 20 MIN: CPT | Performed by: OBSTETRICS & GYNECOLOGY

## 2024-04-30 PROCEDURE — 99214 OFFICE O/P EST MOD 30 MIN: CPT | Performed by: OBSTETRICS & GYNECOLOGY

## 2024-04-30 PROCEDURE — 81002 URINALYSIS NONAUTO W/O SCOPE: CPT | Performed by: OBSTETRICS & GYNECOLOGY

## 2024-04-30 PROCEDURE — 76821 MIDDLE CEREBRAL ARTERY ECHO: CPT | Performed by: OBSTETRICS & GYNECOLOGY

## 2024-04-30 PROCEDURE — 76816 OB US FOLLOW-UP PER FETUS: CPT | Performed by: OBSTETRICS & GYNECOLOGY

## 2024-04-30 NOTE — PROGRESS NOTES
2024      Dianna Sabillon, DO  [address] [facility]  3rd [place] [address]     RE:  SARA TELLEZ  : 1995   AGE: 29 y.o.    This report has been created using voice recognition software. It may contain errors which are inherent in voice recognition technology.    Dear Dr. Sabillon:      I had the pleasure of meeting with Ms. Tellez for a return consultation.  As you know, Ms. Tellez  is a 29 y.o.  at 38w3d (LMP = 13 WK US) who is being followed by our office for gestational hypertension.      Today, Ms. Tellez reports that she feels well.  She notes good fetal movement and denies any symptoms of leaking of fluid, vaginal bleeding, and/or contractions.  She had a fetal ultrasound that was notable for the following.  There is a single intrauterine gestation in a cephalic presentation with a heart rate of 155 beats per minute.  The placenta is anterior.  The amniotic fluid index is 10.6 cm.  The composite gestational age is 38w3d.  The estimated fetal weight is at the 88th percentile.   BPP 8/8. Umbilical artery Doppler studies are normal.  MCA Dopplers normal.  CPR PI normal.      PERTINENT PHYSICAL EXAMINATION:   /85   Pulse (!) 103   Wt 95.7 kg (210 lb 14.4 oz)   LMP 2023   BMI 32.07 kg/m²     Urine dipstick:   Negative for Glucose    Trace for Albumin      A fetal ultrasound assessment was performed today. A report is enclosed for your review.    Assessment & Plan:  29 y.o.  at 38w3d (LMP = 13 WK US) with:    1.   Genetic counseling -- The patient was previously counseled regarding her options for genetic screening and/or diagnostic testing.  Counseling was reviewed.     The patient completed screening with NIPT.  Testing was completed on 10/20/2023.  The fetal fraction was 7.5%.  Results were low risk.     The patient's prenatal record was reviewed.  She had screening for cystic fibrosis that was negative on 10/20/2023.  Screening for spinal muscular atrophy was also

## 2024-04-30 NOTE — PROGRESS NOTES
Lex Tellez presents to office today for US/NST.   Patient denies bleeding, LOF, or cramping.   Positive fetal movement.

## 2024-05-01 ENCOUNTER — ROUTINE PRENATAL (OUTPATIENT)
Dept: OBGYN | Age: 29
End: 2024-05-01
Payer: COMMERCIAL

## 2024-05-01 VITALS
WEIGHT: 215.9 LBS | BODY MASS INDEX: 32.83 KG/M2 | HEART RATE: 91 BPM | DIASTOLIC BLOOD PRESSURE: 78 MMHG | SYSTOLIC BLOOD PRESSURE: 130 MMHG

## 2024-05-01 DIAGNOSIS — Z3A.38 38 WEEKS GESTATION OF PREGNANCY: ICD-10-CM

## 2024-05-01 DIAGNOSIS — Z34.03 ENCOUNTER FOR SUPERVISION OF NORMAL FIRST PREGNANCY IN THIRD TRIMESTER: Primary | ICD-10-CM

## 2024-05-01 LAB
GLUCOSE URINE, POC: NEGATIVE
PROTEIN UA: NEGATIVE

## 2024-05-01 PROCEDURE — 81002 URINALYSIS NONAUTO W/O SCOPE: CPT | Performed by: OBSTETRICS & GYNECOLOGY

## 2024-05-01 PROCEDURE — 0502F SUBSEQUENT PRENATAL CARE: CPT | Performed by: OBSTETRICS & GYNECOLOGY

## 2024-05-01 NOTE — PROGRESS NOTES
SUBJECTIVE:   29 y.o.  female here for routine OB appointment.   Good fetal movement. No lof, vb, ctx.  Previously discussed flu and covid vaccines. Pt declines.   Tdap done. Discussed RSV vaccine.     Pt checking bp  at home, previously some elevations 148/91, 147/83. This week bp has been normal. Admits occasional headaches.   Still recommend induction of labor due to gestational HTN. Pt declines induction. Previously discussed concern for significant morbidity and mortality to both her and baby. Strict preeclampsia precautions.  BP parameters given.   Following with MFM and getting NSTs.     OB History    Para Term  AB Living   1 0       0   SAB IAB Ectopic Molar Multiple Live Births                    # Outcome Date GA Lbr Terry/2nd Weight Sex Delivery Anes PTL Lv   1 Current                Past Medical History:   Diagnosis Date    Colitis 2021    enlarged colon     Migraine 2017    Less frequent, only occasional, usually stress related    Migraine headache 10/31/2018    Vasovagal syncopes         Past Surgical History:   Procedure Laterality Date    SINUS SURGERY      Polyps removed not certain site ie right, left or bilateral    TURBINATE RESECTION N/A 2021    CLOSED REDUCTION NASAL BONE FRACTURE, SUBMUCOUSAL RESECTION OF INFERIOR TURBINATES performed by Jacob Upton MD at [facility] [place]    WISDOM TOOTH EXTRACTION          Family History   Problem Relation Age of Onset    Preeclampsia Mother         I was 3 weeks early because of this    No Known Problems Father     Breast Cancer Maternal Grandmother     Diabetes Paternal Grandmother         Social History       Tobacco History       Smoking Status  Never      Smokeless Tobacco Use  Never              Alcohol History       Alcohol Use Status  Not Currently Drinks/Week  0 Glasses of wine, 0 Cans of beer, 0 Shots of liquor, 0 Drinks containing 0.5 oz of alcohol per week Comment  couple times a month              Drug Use       Drug Use

## 2024-05-03 ENCOUNTER — ROUTINE PRENATAL (OUTPATIENT)
Dept: OBGYN CLINIC | Age: 29
End: 2024-05-03
Payer: COMMERCIAL

## 2024-05-03 VITALS
HEART RATE: 107 BPM | WEIGHT: 213.2 LBS | BODY MASS INDEX: 32.42 KG/M2 | DIASTOLIC BLOOD PRESSURE: 88 MMHG | SYSTOLIC BLOOD PRESSURE: 133 MMHG

## 2024-05-03 DIAGNOSIS — O13.3 GESTATIONAL HYPERTENSION, THIRD TRIMESTER: ICD-10-CM

## 2024-05-03 DIAGNOSIS — Z3A.38 38 WEEKS GESTATION OF PREGNANCY: Primary | ICD-10-CM

## 2024-05-03 LAB
GLUCOSE URINE, POC: NEGATIVE
PROTEIN UA: NEGATIVE

## 2024-05-03 PROCEDURE — 81002 URINALYSIS NONAUTO W/O SCOPE: CPT | Performed by: OBSTETRICS & GYNECOLOGY

## 2024-05-03 PROCEDURE — 59025 FETAL NON-STRESS TEST: CPT | Performed by: OBSTETRICS & GYNECOLOGY

## 2024-05-03 PROCEDURE — 99213 OFFICE O/P EST LOW 20 MIN: CPT | Performed by: OBSTETRICS & GYNECOLOGY

## 2024-05-03 NOTE — PATIENT INSTRUCTIONS
Please arrive for your scheduled appointment at least 15 minutes early with your actual insurance card+ a photo ID. Also if you need any refills ordered or have questions, it may take up 48 hours to reply. Please allow ample time for your refills. Call me when you use last refill. Thank you for your cooperation. You might be having an NST at your next appt. Please eat a large snack or breakfast before coming to office. Thank youCall your primary obstetrician with bleeding, leaking of fluid, abdominal tenderness, headache, blurry vision, epigastric pain and increased urinary frequency.If you are experiencing an emergency and need immediate help, call 911 or go to go emergency room or labor and delivery. Do kick counts after dinner. Call your primary obstetrician if less than 10 kicks in 2 hours after dinner.     Call your primary obstetrician with bleeding, leaking of fluid, abdominal tenderness, headache, blurry vision, epigastric pain and increased urinary frequency.if you are sick, not feeling well or have an infectious process going on please reschedule your appointment by calling [phone]. Also if any family members are not feeling well, please do not bring them to your appointment. We appreciate your cooperation. We are doing this in order to protect our pregnant mothers+ their babies.if you are sick, not feeling well or have an infectious process going on please reschedule your appointment by calling [phone]. Also if any family members are not feeling well, please do not bring them to your appointment. We appreciate your cooperation. We are doing this in order to protect our pregnant mothers+ their babies.  
No

## 2024-05-03 NOTE — PROGRESS NOTES
NON STRESS TEST INTERPRETATION    5/3/24    RE:  Lex Tellez   : 1995   AGE: 29 y.o.    GESTATIONAL AGE:  38w6d    DIAGNOSIS:   Gestational hypertension, BMI 32    INDICATION:  Same as above    TIME ON:  1113      TIME OFF:  1133      RESULT:   REACTIVE      FHR Baseline Rate:   150 bpm    PERIODIC CHANGES:    Accelerations present, variability moderate, no decelerations noted    COMMENTS:      She is to continue having NST's every 3-4 days, and BPP with umbilical artery doppler studies once per week        Lulu Brandon MD, FACOG

## 2024-05-06 ENCOUNTER — HOSPITAL ENCOUNTER (OUTPATIENT)
Age: 29
Discharge: HOME | End: 2024-05-06
Attending: OBSTETRICS & GYNECOLOGY | Admitting: OBSTETRICS & GYNECOLOGY
Payer: COMMERCIAL

## 2024-05-06 ENCOUNTER — ROUTINE PRENATAL (OUTPATIENT)
Dept: OBGYN CLINIC | Age: 29
End: 2024-05-06
Payer: COMMERCIAL

## 2024-05-06 ENCOUNTER — CLINICAL DOCUMENTATION (OUTPATIENT)
Dept: OBGYN CLINIC | Age: 29
End: 2024-05-06

## 2024-05-06 ENCOUNTER — ANCILLARY PROCEDURE (OUTPATIENT)
Dept: OBGYN CLINIC | Age: 29
End: 2024-05-06
Payer: COMMERCIAL

## 2024-05-06 VITALS
SYSTOLIC BLOOD PRESSURE: 126 MMHG | RESPIRATION RATE: 16 BRPM | DIASTOLIC BLOOD PRESSURE: 82 MMHG | HEART RATE: 86 BPM | TEMPERATURE: 98.1 F

## 2024-05-06 VITALS
WEIGHT: 213.25 LBS | BODY MASS INDEX: 32.42 KG/M2 | HEART RATE: 99 BPM | DIASTOLIC BLOOD PRESSURE: 84 MMHG | SYSTOLIC BLOOD PRESSURE: 130 MMHG

## 2024-05-06 DIAGNOSIS — O16.9 ELEVATED BLOOD PRESSURE COMPLICATING PREGNANCY, ANTEPARTUM: ICD-10-CM

## 2024-05-06 DIAGNOSIS — D25.1 INTRAMURAL LEIOMYOMA OF UTERUS: ICD-10-CM

## 2024-05-06 DIAGNOSIS — O13.3 GESTATIONAL HYPERTENSION, THIRD TRIMESTER: Primary | ICD-10-CM

## 2024-05-06 DIAGNOSIS — Z87.898 HISTORY OF SYNCOPE: ICD-10-CM

## 2024-05-06 PROBLEM — O16.3 ELEVATED BLOOD PRESSURE AFFECTING PREGNANCY IN THIRD TRIMESTER, ANTEPARTUM: Status: ACTIVE | Noted: 2024-05-06

## 2024-05-06 LAB
ALBUMIN SERPL-MCNC: 3.5 G/DL (ref 3.5–5.2)
ALP SERPL-CCNC: 161 U/L (ref 35–104)
ALT SERPL-CCNC: 13 U/L (ref 0–32)
ANION GAP SERPL CALCULATED.3IONS-SCNC: 10 MMOL/L (ref 7–16)
AST SERPL-CCNC: 24 U/L (ref 0–31)
BASOPHILS # BLD: 0.13 K/UL (ref 0–0.2)
BASOPHILS NFR BLD: 1 % (ref 0–2)
BILIRUB SERPL-MCNC: 0.3 MG/DL (ref 0–1.2)
BUN SERPL-MCNC: 10 MG/DL (ref 6–20)
CALCIUM SERPL-MCNC: 9 MG/DL (ref 8.6–10.2)
CHLORIDE SERPL-SCNC: 102 MMOL/L (ref 98–107)
CO2 SERPL-SCNC: 22 MMOL/L (ref 22–29)
CREAT SERPL-MCNC: 0.8 MG/DL (ref 0.5–1)
CREAT UR-MCNC: 53.9 MG/DL (ref 29–226)
EOSINOPHIL # BLD: 0.56 K/UL (ref 0.05–0.5)
EOSINOPHILS RELATIVE PERCENT: 4 % (ref 0–6)
ERYTHROCYTE [DISTWIDTH] IN BLOOD BY AUTOMATED COUNT: 14.1 % (ref 11.5–15)
GFR, ESTIMATED: >90 ML/MIN/1.73M2
GLUCOSE SERPL-MCNC: 80 MG/DL (ref 74–99)
GLUCOSE URINE, POC: NORMAL
HCT VFR BLD AUTO: 40.7 % (ref 34–48)
HGB BLD-MCNC: 13 G/DL (ref 11.5–15.5)
IMM GRANULOCYTES # BLD AUTO: 0.14 K/UL (ref 0–0.58)
IMM GRANULOCYTES NFR BLD: 1 % (ref 0–5)
LYMPHOCYTES NFR BLD: 2.49 K/UL (ref 1.5–4)
LYMPHOCYTES RELATIVE PERCENT: 16 % (ref 20–42)
MCH RBC QN AUTO: 29 PG (ref 26–35)
MCHC RBC AUTO-ENTMCNC: 31.9 G/DL (ref 32–34.5)
MCV RBC AUTO: 90.6 FL (ref 80–99.9)
MONOCYTES NFR BLD: 0.88 K/UL (ref 0.1–0.95)
MONOCYTES NFR BLD: 6 % (ref 2–12)
NEUTROPHILS NFR BLD: 73 % (ref 43–80)
NEUTS SEG NFR BLD: 11.11 K/UL (ref 1.8–7.3)
PLATELET # BLD AUTO: 341 K/UL (ref 130–450)
PMV BLD AUTO: 10.3 FL (ref 7–12)
POTASSIUM SERPL-SCNC: 3.9 MMOL/L (ref 3.5–5)
PROT SERPL-MCNC: 7 G/DL (ref 6.4–8.3)
PROTEIN UA: NEGATIVE
RBC # BLD AUTO: 4.49 M/UL (ref 3.5–5.5)
SODIUM SERPL-SCNC: 134 MMOL/L (ref 132–146)
TOTAL PROTEIN, URINE: <4 MG/DL (ref 0–12)
URATE SERPL-MCNC: 5.4 MG/DL (ref 2.4–5.7)
URINE TOTAL PROTEIN CREATININE RATIO: NORMAL (ref 0–0.2)
WBC OTHER # BLD: 15.3 K/UL (ref 4.5–11.5)

## 2024-05-06 PROCEDURE — 99213 OFFICE O/P EST LOW 20 MIN: CPT

## 2024-05-06 PROCEDURE — 81002 URINALYSIS NONAUTO W/O SCOPE: CPT | Performed by: OBSTETRICS & GYNECOLOGY

## 2024-05-06 PROCEDURE — 84550 ASSAY OF BLOOD/URIC ACID: CPT

## 2024-05-06 PROCEDURE — 59025 FETAL NON-STRESS TEST: CPT | Performed by: OBSTETRICS & GYNECOLOGY

## 2024-05-06 PROCEDURE — 76818 FETAL BIOPHYS PROFILE W/NST: CPT | Performed by: OBSTETRICS & GYNECOLOGY

## 2024-05-06 PROCEDURE — 82570 ASSAY OF URINE CREATININE: CPT

## 2024-05-06 PROCEDURE — 76820 UMBILICAL ARTERY ECHO: CPT | Performed by: OBSTETRICS & GYNECOLOGY

## 2024-05-06 PROCEDURE — 99213 OFFICE O/P EST LOW 20 MIN: CPT | Performed by: OBSTETRICS & GYNECOLOGY

## 2024-05-06 PROCEDURE — 85025 COMPLETE CBC W/AUTO DIFF WBC: CPT

## 2024-05-06 PROCEDURE — 99213 OFFICE O/P EST LOW 20 MIN: CPT | Performed by: FAMILY MEDICINE

## 2024-05-06 PROCEDURE — 76815 OB US LIMITED FETUS(S): CPT | Performed by: OBSTETRICS & GYNECOLOGY

## 2024-05-06 PROCEDURE — 76821 MIDDLE CEREBRAL ARTERY ECHO: CPT | Performed by: OBSTETRICS & GYNECOLOGY

## 2024-05-06 PROCEDURE — 84156 ASSAY OF PROTEIN URINE: CPT

## 2024-05-06 PROCEDURE — 80053 COMPREHEN METABOLIC PANEL: CPT

## 2024-05-06 NOTE — PROGRESS NOTES
May 6, 2024      Dianna Sabillon, DO  [address]  [address]rd [place] [address]     RE:  SARA TELLEZ  : 1995   AGE: 29 y.o.    This report has been created using voice recognition software. It may contain errors which are inherent in voice recognition technology.    Dear Dr. Sabillon:      I had the pleasure of meeting with Ms. Tellez for fetal testing.  As you know, Ms. Tellez  is a 29 y.o.  at 39w2d (LMP = 13 WK US) who is being followed by our office for gestational hypertension.      Today, Ms. Tellez reports that she feels well.  She notes good fetal movement and denies any symptoms of leaking of fluid, vaginal bleeding, and/or contractions.      The patient presented for a nonstress test visit.  Initially, the baseline fetal heart rate was 1 60-1 70 with moderate variability.  The patient was kept for prolonged monitoring.    An ultrasound was completed.  There was a single intrauterine gestation in a cephalic presentation with a heart rate of 153 bpm.  The placenta is anterior.  The HORTENCIA was 13.8 cm.  The BPP was 8/8.  The umbilical artery PI was normal.  The MCA PSV was normal at 1.15 MOM.  The CPR PI was >1.    Fetal monitoring was repeated following the ultrasound.  Initially, the fetal heart rate tracing had minimal variability, likely secondary with a fetal sleep cycle.  The fetal heart rate baseline then ranged between 150 and 160 with accelerations.  The patient was noted to have contractions every 2 to 5 minutes during her evaluation.      PERTINENT PHYSICAL EXAMINATION:   /84   Pulse 99   Wt 96.7 kg (213 lb 4 oz)   LMP 2023   BMI 32.42 kg/m²     Urine dipstick:   Negative for Glucose    Negative for Albumin      A fetal ultrasound assessment was performed today. A report is enclosed for your review.    Assessment & Plan:  29 y.o.  at 39w2d (LMP = 13 WK US) with:    1.  Fetal testing/regular uterine contractions -- The patient is a 29-year-old  1 para 0

## 2024-05-06 NOTE — PROGRESS NOTES
MFM Note      The patient is a 29-year-old  1 para 0 currently at 39 weeks 2 days (LMP = 13 WK US) who is followed by maternal-fetal medicine secondary to a history of syncope, uterine fibroid, gestational hypertension, and other medical issues.    The patient presented today for a nonstress test.  Initially, the fetal heart rate baseline was between 160-170 with accelerations.  The baseline seemed to settle down into the 150s.  The recommendation was made for an ultrasound.    Ultrasound was completed.  There is a single intrauterine gestation in cephalic presentation with a heart rate of 153 bpm.  The HORTENCIA is 13.8 cm.  BPP was 8/8.  Umbilical artery PI normal.  MCA PSV normal at 1.15 MOM.  CPR PI was >1.    The patient was placed back on the monitor.  Initially, the fetal heart rate tracing had minimal variability, likely consistent with fetal sleeping.  The fetal heart rate baseline was then between 150 and 160 with accelerations.  The patient was noted to have contractions every 2 to 5 minutes during her entire evaluation.    Secondary to the patient's advanced gestational age, the recommendation was made for further evaluation on labor and delivery.    Recommendations:  Continuous fetal monitoring  Serial blood pressure monitoring  Preeclampsia panel and urine studies  Rule out labor  Delivery is recommended with any nonreassuring fetal monitoring, blood pressure elevations, preeclampsia, or labor.    If the patient remains stable with normal blood pressures, reassuring fetal monitoring, and without evidence of labor or preeclampsia, then she can be discharged home.  She was counseled to follow-up with Dr. Sabillon as scheduled on Wednesday, May 8.  Precautions to call and/or return sooner were reviewed.    Antepartum was contacted regarding the patient.    Dr. Galvez was contacted regarding the patient.    Please call with any questions or concerns.    A formal consult letter will follow.

## 2024-05-06 NOTE — DISCHARGE INSTRUCTIONS
Home Undelivered Discharge Instructions    After Discharge Orders:    Future Appointments   Date Time Provider Department Center   5/8/2024  1:15 PM Dianna Sabillon, DO [facility]   5/10/2024  1:30 PM Lulu Brandon MD [facility]                   Diet:  normal diet as tolerated    Rest: normal activity as tolerated    Other instructions: Do kick counts once a day on your baby. Choose the time of day your baby is most active. Get in a comfortable lying or sitting position and time how long it takes to feel 10 kicks, twists, turns, swishes, or rolls. Call your physician or midwife if there have not been 10 kicks in 2 hours    Call physician or midwife, return to Labor and Delivery, call 911, or go to the nearest Emergency Room if: increased leakage or fluid, contractions more than  6 per  1 hour, decreased fetal movement, persistent low back pain or cramping, bleeding from vaginal area, difficulty urinating, pain with urination, difficulty breathing, new calf pain, persistent headache, or vision change

## 2024-05-06 NOTE — PROGRESS NOTES
Patient instructed on discharge instructions with verbalized understanding. Questions answered prn.

## 2024-05-06 NOTE — H&P
Department of Obstetrics and Gynecology  Labor and Delivery  Triage Note      SUBJECTIVE:  Lex Tellez is a 29 y.o. female, , Patient's last menstrual period was 2023., Estimated Date of Delivery: 24, 39w2d, sent from [facility] office for prolonged fetal monitoring and PIH labs. Presented for routine NST at [facility] office and FHR baseline noted to be elevated in the 160s. Also feeling pelvic pressure. Denies HA, blurry vision, abdominal pain, or edema.  Patient has been in discussion with Dr. Brandon and Dr. Sabillon regarding IOL beginning at 37 weeks. Pt has elected to continue ambulatory monitoring of BP and outpatient NST/BPP.     Prenatal course: gHTN, migraines, hx of vasovagal syncope, RLS, 5cm fibroid, eccentric cord insertion    PAST OB HISTORY  OB History          1    Para   0    Term                AB        Living   0         SAB        IAB        Ectopic        Molar        Multiple        Live Births                    Past Medical History:        Diagnosis Date    Colitis 2021    enlarged colon     Migraine 2017    Less frequent, only occasional, usually stress related    Migraine headache 10/31/2018    Vasovagal syncopes      Past Surgical History:        Procedure Laterality Date    SINUS SURGERY      Polyps removed not certain site ie right, left or bilateral    TURBINATE RESECTION N/A 2021    CLOSED REDUCTION NASAL BONE FRACTURE, SUBMUCOUSAL RESECTION OF INFERIOR TURBINATES performed by Jacob Upton MD at [facility] OR    WISDOM TOOTH EXTRACTION       Allergies:  Food and Seasonal  Social History:    Social History     Socioeconomic History    Marital status:      Spouse name: Not on file    Number of children: Not on file    Years of education: Not on file    Highest education level: Not on file   Occupational History    Not on file   Tobacco Use    Smoking status: Never    Smokeless tobacco: Never   Vaping Use    Vaping Use: Never used   Substance and

## 2024-05-07 ENCOUNTER — HOSPITAL ENCOUNTER (INPATIENT)
Age: 29
LOS: 4 days | Discharge: HOME | End: 2024-05-11
Attending: OBSTETRICS & GYNECOLOGY | Admitting: OBSTETRICS & GYNECOLOGY
Payer: COMMERCIAL

## 2024-05-07 PROBLEM — Z3A.39 39 WEEKS GESTATION OF PREGNANCY: Status: ACTIVE | Noted: 2024-05-07

## 2024-05-07 PROCEDURE — APPNB30 APP NON BILLABLE TIME 0-30 MINS: Performed by: ADVANCED PRACTICE MIDWIFE

## 2024-05-07 PROCEDURE — 1220000000 HC SEMI PRIVATE OB R&B

## 2024-05-07 RX ORDER — SODIUM CHLORIDE 0.9 % (FLUSH) 0.9 %
5-40 SYRINGE (ML) INJECTION PRN
Status: DISCONTINUED | OUTPATIENT
Start: 2024-05-07 | End: 2024-05-10

## 2024-05-07 RX ORDER — SODIUM CHLORIDE, SODIUM LACTATE, POTASSIUM CHLORIDE, AND CALCIUM CHLORIDE .6; .31; .03; .02 G/100ML; G/100ML; G/100ML; G/100ML
1000 INJECTION, SOLUTION INTRAVENOUS PRN
Status: DISCONTINUED | OUTPATIENT
Start: 2024-05-07 | End: 2024-05-10

## 2024-05-07 RX ORDER — ONDANSETRON 4 MG/1
4 TABLET, ORALLY DISINTEGRATING ORAL EVERY 6 HOURS PRN
Status: DISCONTINUED | OUTPATIENT
Start: 2024-05-07 | End: 2024-05-10

## 2024-05-07 RX ORDER — MISOPROSTOL 200 UG/1
400 TABLET ORAL PRN
Status: DISCONTINUED | OUTPATIENT
Start: 2024-05-07 | End: 2024-05-10

## 2024-05-07 RX ORDER — SODIUM CHLORIDE, SODIUM LACTATE, POTASSIUM CHLORIDE, AND CALCIUM CHLORIDE .6; .31; .03; .02 G/100ML; G/100ML; G/100ML; G/100ML
500 INJECTION, SOLUTION INTRAVENOUS PRN
Status: DISCONTINUED | OUTPATIENT
Start: 2024-05-07 | End: 2024-05-10

## 2024-05-07 RX ORDER — CARBOPROST TROMETHAMINE 250 UG/ML
250 INJECTION, SOLUTION INTRAMUSCULAR PRN
Status: DISCONTINUED | OUTPATIENT
Start: 2024-05-07 | End: 2024-05-10

## 2024-05-07 RX ORDER — SODIUM CHLORIDE 9 MG/ML
25 INJECTION, SOLUTION INTRAVENOUS PRN
Status: DISCONTINUED | OUTPATIENT
Start: 2024-05-07 | End: 2024-05-10

## 2024-05-07 RX ORDER — ONDANSETRON 2 MG/ML
4 INJECTION INTRAMUSCULAR; INTRAVENOUS EVERY 6 HOURS PRN
Status: DISCONTINUED | OUTPATIENT
Start: 2024-05-07 | End: 2024-05-10

## 2024-05-07 RX ORDER — SODIUM CHLORIDE, SODIUM LACTATE, POTASSIUM CHLORIDE, CALCIUM CHLORIDE 600; 310; 30; 20 MG/100ML; MG/100ML; MG/100ML; MG/100ML
INJECTION, SOLUTION INTRAVENOUS CONTINUOUS
Status: DISCONTINUED | OUTPATIENT
Start: 2024-05-08 | End: 2024-05-10

## 2024-05-07 RX ORDER — TRANEXAMIC ACID 10 MG/ML
1000 INJECTION, SOLUTION INTRAVENOUS
Status: ACTIVE | OUTPATIENT
Start: 2024-05-07 | End: 2024-05-08

## 2024-05-07 RX ORDER — METHYLERGONOVINE MALEATE 0.2 MG/ML
200 INJECTION INTRAVENOUS PRN
Status: DISCONTINUED | OUTPATIENT
Start: 2024-05-07 | End: 2024-05-10

## 2024-05-07 RX ORDER — MORPHINE SULFATE 2 MG/ML
2 INJECTION, SOLUTION INTRAMUSCULAR; INTRAVENOUS
Status: DISCONTINUED | OUTPATIENT
Start: 2024-05-07 | End: 2024-05-10

## 2024-05-07 RX ORDER — TERBUTALINE SULFATE 1 MG/ML
0.25 INJECTION, SOLUTION SUBCUTANEOUS
Status: ACTIVE | OUTPATIENT
Start: 2024-05-07 | End: 2024-05-08

## 2024-05-07 RX ORDER — SODIUM CHLORIDE 0.9 % (FLUSH) 0.9 %
5-40 SYRINGE (ML) INJECTION EVERY 12 HOURS SCHEDULED
Status: DISCONTINUED | OUTPATIENT
Start: 2024-05-08 | End: 2024-05-10

## 2024-05-08 ENCOUNTER — ANESTHESIA (OUTPATIENT)
Dept: MOTHER INFANT UNIT | Age: 29
End: 2024-05-08
Payer: COMMERCIAL

## 2024-05-08 ENCOUNTER — ANESTHESIA EVENT (OUTPATIENT)
Dept: MOTHER INFANT UNIT | Age: 29
End: 2024-05-08
Payer: COMMERCIAL

## 2024-05-08 LAB
ABO + RH BLD: NORMAL
ALBUMIN SERPL-MCNC: 3.5 G/DL (ref 3.5–5.2)
ALP SERPL-CCNC: 156 U/L (ref 35–104)
ALT SERPL-CCNC: 13 U/L (ref 0–32)
AMPHET UR QL SCN: NEGATIVE
ANION GAP SERPL CALCULATED.3IONS-SCNC: 9 MMOL/L (ref 7–16)
ARM BAND NUMBER: NORMAL
AST SERPL-CCNC: 22 U/L (ref 0–31)
BACTERIA URNS QL MICRO: ABNORMAL
BARBITURATES UR QL SCN: NEGATIVE
BASOPHILS # BLD: 0.13 K/UL (ref 0–0.2)
BASOPHILS NFR BLD: 1 % (ref 0–2)
BENZODIAZ UR QL: NEGATIVE
BILIRUB SERPL-MCNC: 0.4 MG/DL (ref 0–1.2)
BILIRUB UR QL STRIP: NEGATIVE
BLOOD BANK SAMPLE EXPIRATION: NORMAL
BLOOD GROUP ANTIBODIES SERPL: NEGATIVE
BUN SERPL-MCNC: 9 MG/DL (ref 6–20)
BUPRENORPHINE UR QL: NEGATIVE
CALCIUM SERPL-MCNC: 9 MG/DL (ref 8.6–10.2)
CANNABINOIDS UR QL SCN: NEGATIVE
CHLORIDE SERPL-SCNC: 100 MMOL/L (ref 98–107)
CLARITY UR: CLEAR
CO2 SERPL-SCNC: 20 MMOL/L (ref 22–29)
COCAINE UR QL SCN: NEGATIVE
COLOR UR: YELLOW
CREAT SERPL-MCNC: 0.7 MG/DL (ref 0.5–1)
CREAT UR-MCNC: 107.6 MG/DL (ref 29–226)
D DIMER: 827 NG/ML DDU (ref 0–230)
EOSINOPHIL # BLD: 0.56 K/UL (ref 0.05–0.5)
EOSINOPHILS RELATIVE PERCENT: 4 % (ref 0–6)
ERYTHROCYTE [DISTWIDTH] IN BLOOD BY AUTOMATED COUNT: 14.2 % (ref 11.5–15)
FENTANYL UR QL: NEGATIVE
FIBRINOGEN PPP-MCNC: 489 MG/DL (ref 200–400)
GFR, ESTIMATED: >90 ML/MIN/1.73M2
GLUCOSE SERPL-MCNC: 100 MG/DL (ref 74–99)
GLUCOSE UR STRIP-MCNC: NEGATIVE MG/DL
HCT VFR BLD AUTO: 37.8 % (ref 34–48)
HGB BLD-MCNC: 12.2 G/DL (ref 11.5–15.5)
HGB UR QL STRIP.AUTO: NEGATIVE
IMM GRANULOCYTES # BLD AUTO: 0.11 K/UL (ref 0–0.58)
IMM GRANULOCYTES NFR BLD: 1 % (ref 0–5)
KETONES UR STRIP-MCNC: NEGATIVE MG/DL
LEUKOCYTE ESTERASE UR QL STRIP: NEGATIVE
LYMPHOCYTES NFR BLD: 2.64 K/UL (ref 1.5–4)
LYMPHOCYTES RELATIVE PERCENT: 19 % (ref 20–42)
MCH RBC QN AUTO: 29 PG (ref 26–35)
MCHC RBC AUTO-ENTMCNC: 32.3 G/DL (ref 32–34.5)
MCV RBC AUTO: 90 FL (ref 80–99.9)
METHADONE UR QL: NEGATIVE
MONOCYTES NFR BLD: 0.99 K/UL (ref 0.1–0.95)
MONOCYTES NFR BLD: 7 % (ref 2–12)
NEUTROPHILS NFR BLD: 69 % (ref 43–80)
NEUTS SEG NFR BLD: 9.76 K/UL (ref 1.8–7.3)
NITRITE UR QL STRIP: NEGATIVE
OPIATES UR QL SCN: NEGATIVE
OXYCODONE UR QL SCN: NEGATIVE
PCP UR QL SCN: NEGATIVE
PH UR STRIP: 6.5 [PH] (ref 5–9)
PLATELET # BLD AUTO: 284 K/UL (ref 130–450)
PMV BLD AUTO: 10.3 FL (ref 7–12)
POTASSIUM SERPL-SCNC: 3.8 MMOL/L (ref 3.5–5)
PROT SERPL-MCNC: 6.4 G/DL (ref 6.4–8.3)
PROT UR STRIP-MCNC: NEGATIVE MG/DL
RBC # BLD AUTO: 4.2 M/UL (ref 3.5–5.5)
RBC #/AREA URNS HPF: ABNORMAL /HPF
SODIUM SERPL-SCNC: 129 MMOL/L (ref 132–146)
SP GR UR STRIP: 1.02 (ref 1–1.03)
TEST INFORMATION: NORMAL
TOTAL PROTEIN, URINE: 11 MG/DL (ref 0–12)
URATE SERPL-MCNC: 5 MG/DL (ref 2.4–5.7)
URINE TOTAL PROTEIN CREATININE RATIO: 0.1 (ref 0–0.2)
UROBILINOGEN UR STRIP-ACNC: 0.2 EU/DL (ref 0–1)
WBC #/AREA URNS HPF: ABNORMAL /HPF
WBC OTHER # BLD: 14.2 K/UL (ref 4.5–11.5)

## 2024-05-08 PROCEDURE — 84550 ASSAY OF BLOOD/URIC ACID: CPT

## 2024-05-08 PROCEDURE — 81001 URINALYSIS AUTO W/SCOPE: CPT

## 2024-05-08 PROCEDURE — 85025 COMPLETE CBC W/AUTO DIFF WBC: CPT

## 2024-05-08 PROCEDURE — 1220000001 HC SEMI PRIVATE L&D R&B

## 2024-05-08 PROCEDURE — 85379 FIBRIN DEGRADATION QUANT: CPT

## 2024-05-08 PROCEDURE — 80053 COMPREHEN METABOLIC PANEL: CPT

## 2024-05-08 PROCEDURE — 6360000002 HC RX W HCPCS: Performed by: OBSTETRICS & GYNECOLOGY

## 2024-05-08 PROCEDURE — 6370000000 HC RX 637 (ALT 250 FOR IP): Performed by: ADVANCED PRACTICE MIDWIFE

## 2024-05-08 PROCEDURE — 85384 FIBRINOGEN ACTIVITY: CPT

## 2024-05-08 PROCEDURE — 86850 RBC ANTIBODY SCREEN: CPT

## 2024-05-08 PROCEDURE — 86901 BLOOD TYPING SEROLOGIC RH(D): CPT

## 2024-05-08 PROCEDURE — 80307 DRUG TEST PRSMV CHEM ANLYZR: CPT

## 2024-05-08 PROCEDURE — 84156 ASSAY OF PROTEIN URINE: CPT

## 2024-05-08 PROCEDURE — 86900 BLOOD TYPING SEROLOGIC ABO: CPT

## 2024-05-08 PROCEDURE — 2580000003 HC RX 258: Performed by: ADVANCED PRACTICE MIDWIFE

## 2024-05-08 PROCEDURE — 82570 ASSAY OF URINE CREATININE: CPT

## 2024-05-08 PROCEDURE — 6360000002 HC RX W HCPCS

## 2024-05-08 RX ORDER — PROMETHAZINE HYDROCHLORIDE 25 MG/ML
25 INJECTION, SOLUTION INTRAMUSCULAR; INTRAVENOUS EVERY 6 HOURS PRN
Status: DISCONTINUED | OUTPATIENT
Start: 2024-05-08 | End: 2024-05-10

## 2024-05-08 RX ORDER — NALOXONE HYDROCHLORIDE 0.4 MG/ML
INJECTION, SOLUTION INTRAMUSCULAR; INTRAVENOUS; SUBCUTANEOUS PRN
Status: DISCONTINUED | OUTPATIENT
Start: 2024-05-08 | End: 2024-05-10

## 2024-05-08 RX ORDER — MEPERIDINE HYDROCHLORIDE 25 MG/ML
25 INJECTION INTRAMUSCULAR; INTRAVENOUS; SUBCUTANEOUS EVERY 4 HOURS PRN
Status: DISCONTINUED | OUTPATIENT
Start: 2024-05-08 | End: 2024-05-10

## 2024-05-08 RX ADMIN — Medication 1 MILLI-UNITS/MIN: at 14:45

## 2024-05-08 RX ADMIN — Medication 25 MCG: at 01:08

## 2024-05-08 RX ADMIN — Medication 25 MCG: at 10:13

## 2024-05-08 RX ADMIN — SODIUM CHLORIDE, POTASSIUM CHLORIDE, SODIUM LACTATE AND CALCIUM CHLORIDE: 600; 310; 30; 20 INJECTION, SOLUTION INTRAVENOUS at 01:11

## 2024-05-08 RX ADMIN — MEPERIDINE HYDROCHLORIDE 25 MG: 25 INJECTION INTRAMUSCULAR; INTRAVENOUS; SUBCUTANEOUS at 22:34

## 2024-05-08 RX ADMIN — SODIUM CHLORIDE, POTASSIUM CHLORIDE, SODIUM LACTATE AND CALCIUM CHLORIDE: 600; 310; 30; 20 INJECTION, SOLUTION INTRAVENOUS at 05:39

## 2024-05-08 RX ADMIN — PROMETHAZINE HYDROCHLORIDE 25 MG: 25 INJECTION INTRAMUSCULAR; INTRAVENOUS at 22:34

## 2024-05-08 RX ADMIN — Medication 25 MCG: at 20:33

## 2024-05-08 RX ADMIN — Medication 25 MCG: at 06:00

## 2024-05-08 ASSESSMENT — LIFESTYLE VARIABLES: SMOKING_STATUS: 0

## 2024-05-08 ASSESSMENT — PAIN SCALES - GENERAL: PAINLEVEL_OUTOF10: 6

## 2024-05-08 NOTE — H&P
CHIEF COMPLAINT:  here tonight with elevated b/p, was here yesterday and same thing labs were of and went home, b/p all day up and told to come back if they stayed up, no lof vb, some ctx, +FM    HISTORY OF PRESENT ILLNESS:      The patient is a 29 y.o. female at 39w3d.  OB History          1    Para   0    Term   0       0    AB   0    Living   0         SAB   0    IAB   0    Ectopic   0    Molar   0    Multiple   0    Live Births   0            Patient presents with a chief complaint as above and is being admitted for hypertension and induction    Estimated Due Date: Estimated Date of Delivery: 24    PRENATAL CARE:    Complicated by: elevated blood pressure, migraines, eccentric cord insertion, syncope, 5+cm fibroid by cervix,     PAST OB HISTORY  OB History          1    Para   0    Term   0       0    AB   0    Living   0         SAB   0    IAB   0    Ectopic   0    Molar   0    Multiple   0    Live Births   0                Past Medical History:        Diagnosis Date    Colitis 2021    enlarged colon     Migraine 2017    Less frequent, only occasional, usually stress related    Migraine headache 10/31/2018    Vasovagal syncopes      Past Surgical History:        Procedure Laterality Date    SINUS SURGERY      Polyps removed not certain site ie right, left or bilateral    TURBINATE RESECTION N/A 2021    CLOSED REDUCTION NASAL BONE FRACTURE, SUBMUCOUSAL RESECTION OF INFERIOR TURBINATES performed by Jacob Upton MD at [facility]    WISDOM TOOTH EXTRACTION       Allergies:  Food and Seasonal  Social History:    Social History     Socioeconomic History    Marital status:      Spouse name: Not on file    Number of children: Not on file    Years of education: Not on file    Highest education level: Not on file   Occupational History    Not on file   Tobacco Use    Smoking status: Never    Smokeless tobacco: Never   Vaping Use    Vaping Use: Never used

## 2024-05-08 NOTE — PROGRESS NOTES
Dr. Juarez at nurses station updated on sve and pt not wanting pitocin anymore would like cytotec. Orders received to stop pitocin and let patient eat and start cytotec

## 2024-05-08 NOTE — PROGRESS NOTES
Updated Mark ALVAREZ on FHT's, and second cytotec due, orders obtained to hold off a little for next cytotec and give IV fluid bolus.

## 2024-05-08 NOTE — PROGRESS NOTES
Pt presents to antepartum for elevated bp's at home (ranged 1421//101-129/89). Pt denies h/a,dizziness, and swelling. Pt did have a one minute episode where she seen stars while at work. Denies any further visual disturbances. Pt denies LOF, VB, and feels +FM.     Placed on efm. BP's cycled.

## 2024-05-08 NOTE — ANESTHESIA PRE PROCEDURE
Department of Anesthesiology  Preprocedure Note       Name:  Lex Tellez   Age:  29 y.o.  :  1995                                          MRN:  [id number]         Date:  2024      Surgeon: * No surgeons listed *    Procedure: * No procedures listed *    Medications prior to admission:   Prior to Admission medications    Medication Sig Start Date End Date Taking? Authorizing Provider   aspirin (ASPIRIN 81) 81 MG chewable tablet Take 1 tablet by mouth daily 24   Lulu Brandon MD   ferrous sulfate (IRON 325) 325 (65 Fe) MG tablet Take 1 tablet by mouth in the morning and at bedtime 3/19/24   Lulu Brandon MD   Prenatal MV-Min-Fe Fum-FA-DHA (PRENATAL 1 PO) Take by mouth    ProviderWoodrow MD   KAITLIB FE 0.8-25 MG-MCG CHEW Take 1 tablet by mouth nightly  18   ProviderWoodrow MD       Current medications:    Current Facility-Administered Medications   Medication Dose Route Frequency Provider Last Rate Last Admin    naloxone 0.4 mg in 10 mL sodium chloride syringe   IntraVENous PRN Ludin Goldne APRN - CRNA        fentaNYL 1.85mcg/ml and Bupivicaine 0.1% in 0.9% NS 135ml infusion (OB) epidural  15 mL/hr Epidural Continuous Ludin Golden APRN - CRNA        lactated ringers IV soln infusion   IntraVENous Continuous Dony-Mark Haley, APRN -  mL/hr at 24 1109 Rate Verify at 24 1109    lactated ringers bolus 500 mL  500 mL IntraVENous PRN Dony-Yamilex Haleydina, APRN - CNM        Or    lactated ringers bolus 1,000 mL  1,000 mL IntraVENous PRN Dony-Yamilex Haleydina, APRN - CNM        sodium chloride flush 0.9 % injection 5-40 mL  5-40 mL IntraVENous 2 times per day Dony-Da Haleya, APRN - CNM        sodium chloride flush 0.9 % injection 5-40 mL  5-40 mL IntraVENous PRN Dony-Da Haleya, APRN - CNM        0.9 % sodium chloride infusion  25 mL IntraVENous PRN Dony-Da Haleya, APRN - CNM        methylergonovine  (gestational):      ECG reviewed               Beta Blocker:  Not on Beta Blocker      ROS comment: Vasovagal episodes     Neuro/Psych:   (+) headaches: migraine headaches            GI/Hepatic/Renal: Neg GI/Hepatic/Renal ROS           ROS comment: colitis.   Endo/Other: Negative Endo/Other ROS                    Abdominal:             Vascular:          Other Findings:             Anesthesia Plan      epidural, spinal and general     ASA 2           MIPS: Prophylactic antiemetics administered.  Anesthetic plan and risks discussed with patient.    Use of blood products discussed with patient whom consented to blood products.    Plan discussed with CRNA and attending.                    Cezar Robbins RN   5/8/2024

## 2024-05-09 ENCOUNTER — ANESTHESIA (OUTPATIENT)
Dept: LABOR AND DELIVERY | Age: 29
End: 2024-05-09
Payer: COMMERCIAL

## 2024-05-09 ENCOUNTER — ANESTHESIA EVENT (OUTPATIENT)
Dept: LABOR AND DELIVERY | Age: 29
End: 2024-05-09
Payer: COMMERCIAL

## 2024-05-09 DIAGNOSIS — O16.2 ELEVATED BLOOD PRESSURE AFFECTING PREGNANCY IN SECOND TRIMESTER, ANTEPARTUM: ICD-10-CM

## 2024-05-09 DIAGNOSIS — O13.3 GESTATIONAL HYPERTENSION, THIRD TRIMESTER: ICD-10-CM

## 2024-05-09 PROCEDURE — 10907ZC DRAINAGE OF AMNIOTIC FLUID, THERAPEUTIC FROM PRODUCTS OF CONCEPTION, VIA NATURAL OR ARTIFICIAL OPENING: ICD-10-PCS | Performed by: OBSTETRICS & GYNECOLOGY

## 2024-05-09 PROCEDURE — 6370000000 HC RX 637 (ALT 250 FOR IP): Performed by: ADVANCED PRACTICE MIDWIFE

## 2024-05-09 PROCEDURE — 2500000003 HC RX 250 WO HCPCS

## 2024-05-09 PROCEDURE — 2500000003 HC RX 250 WO HCPCS: Performed by: ANESTHESIOLOGY

## 2024-05-09 PROCEDURE — 1220000001 HC SEMI PRIVATE L&D R&B

## 2024-05-09 PROCEDURE — 6360000002 HC RX W HCPCS

## 2024-05-09 PROCEDURE — 6360000002 HC RX W HCPCS: Performed by: NURSE ANESTHETIST, CERTIFIED REGISTERED

## 2024-05-09 PROCEDURE — 6360000002 HC RX W HCPCS: Performed by: OBSTETRICS & GYNECOLOGY

## 2024-05-09 PROCEDURE — 3700000025 EPIDURAL BLOCK: Performed by: ANESTHESIOLOGY

## 2024-05-09 PROCEDURE — 3E0P7VZ INTRODUCTION OF HORMONE INTO FEMALE REPRODUCTIVE, VIA NATURAL OR ARTIFICIAL OPENING: ICD-10-PCS | Performed by: OBSTETRICS & GYNECOLOGY

## 2024-05-09 PROCEDURE — 3E033VJ INTRODUCTION OF OTHER HORMONE INTO PERIPHERAL VEIN, PERCUTANEOUS APPROACH: ICD-10-PCS | Performed by: OBSTETRICS & GYNECOLOGY

## 2024-05-09 PROCEDURE — 2500000003 HC RX 250 WO HCPCS: Performed by: NURSE ANESTHETIST, CERTIFIED REGISTERED

## 2024-05-09 PROCEDURE — 51701 INSERT BLADDER CATHETER: CPT

## 2024-05-09 PROCEDURE — 6360000002 HC RX W HCPCS: Performed by: ANESTHESIOLOGY

## 2024-05-09 PROCEDURE — 0HQ9XZZ REPAIR PERINEUM SKIN, EXTERNAL APPROACH: ICD-10-PCS | Performed by: OBSTETRICS & GYNECOLOGY

## 2024-05-09 PROCEDURE — 2580000003 HC RX 258: Performed by: ADVANCED PRACTICE MIDWIFE

## 2024-05-09 RX ORDER — BUPIVACAINE HYDROCHLORIDE 5 MG/ML
INJECTION, SOLUTION EPIDURAL; INTRACAUDAL PRN
Status: DISCONTINUED | OUTPATIENT
Start: 2024-05-09 | End: 2024-05-10 | Stop reason: SDUPTHER

## 2024-05-09 RX ORDER — ONDANSETRON 2 MG/ML
4 INJECTION INTRAMUSCULAR; INTRAVENOUS EVERY 6 HOURS PRN
Status: DISCONTINUED | OUTPATIENT
Start: 2024-05-09 | End: 2024-05-10

## 2024-05-09 RX ORDER — ACETAMINOPHEN 650 MG
TABLET, EXTENDED RELEASE ORAL
Status: COMPLETED
Start: 2024-05-09 | End: 2024-05-09

## 2024-05-09 RX ORDER — LIDOCAINE HYDROCHLORIDE 10 MG/ML
INJECTION, SOLUTION INFILTRATION; PERINEURAL
Status: DISCONTINUED
Start: 2024-05-09 | End: 2024-05-10

## 2024-05-09 RX ORDER — ASPIRIN 81 MG
81 TABLET,CHEWABLE ORAL DAILY
Qty: 90 TABLET | Refills: 1 | Status: SHIPPED | OUTPATIENT
Start: 2024-05-09

## 2024-05-09 RX ORDER — FENTANYL CITRATE 50 UG/ML
INJECTION, SOLUTION INTRAMUSCULAR; INTRAVENOUS PRN
Status: DISCONTINUED | OUTPATIENT
Start: 2024-05-09 | End: 2024-05-10 | Stop reason: SDUPTHER

## 2024-05-09 RX ORDER — LIDOCAINE HYDROCHLORIDE 20 MG/ML
INJECTION, SOLUTION EPIDURAL; INFILTRATION; INTRACAUDAL; PERINEURAL PRN
Status: DISCONTINUED | OUTPATIENT
Start: 2024-05-09 | End: 2024-05-09

## 2024-05-09 RX ORDER — DIPHENHYDRAMINE HYDROCHLORIDE 50 MG/ML
12.5 INJECTION INTRAMUSCULAR; INTRAVENOUS EVERY 6 HOURS PRN
Status: DISCONTINUED | OUTPATIENT
Start: 2024-05-09 | End: 2024-05-10

## 2024-05-09 RX ORDER — DIPHENHYDRAMINE HYDROCHLORIDE 50 MG/ML
INJECTION INTRAMUSCULAR; INTRAVENOUS
Status: COMPLETED
Start: 2024-05-09 | End: 2024-05-09

## 2024-05-09 RX ORDER — LIDOCAINE HYDROCHLORIDE 20 MG/ML
INJECTION, SOLUTION EPIDURAL; INFILTRATION; INTRACAUDAL; PERINEURAL PRN
Status: DISCONTINUED | OUTPATIENT
Start: 2024-05-09 | End: 2024-05-10 | Stop reason: SDUPTHER

## 2024-05-09 RX ORDER — NALOXONE HYDROCHLORIDE 0.4 MG/ML
INJECTION, SOLUTION INTRAMUSCULAR; INTRAVENOUS; SUBCUTANEOUS PRN
Status: DISCONTINUED | OUTPATIENT
Start: 2024-05-09 | End: 2024-05-10

## 2024-05-09 RX ADMIN — Medication: at 23:40

## 2024-05-09 RX ADMIN — Medication 15 ML/HR: at 08:51

## 2024-05-09 RX ADMIN — MEPERIDINE HYDROCHLORIDE 25 MG: 25 INJECTION INTRAMUSCULAR; INTRAVENOUS; SUBCUTANEOUS at 04:50

## 2024-05-09 RX ADMIN — DIPHENHYDRAMINE HYDROCHLORIDE 12.5 MG: 50 INJECTION INTRAMUSCULAR; INTRAVENOUS at 13:52

## 2024-05-09 RX ADMIN — LIDOCAINE HYDROCHLORIDE 5 ML: 20 INJECTION, SOLUTION EPIDURAL; INFILTRATION; INTRACAUDAL; PERINEURAL at 22:23

## 2024-05-09 RX ADMIN — MEPERIDINE HYDROCHLORIDE 25 MG: 25 INJECTION INTRAMUSCULAR; INTRAVENOUS; SUBCUTANEOUS at 13:06

## 2024-05-09 RX ADMIN — Medication 166.7 ML: at 23:55

## 2024-05-09 RX ADMIN — Medication 25 MCG: at 00:45

## 2024-05-09 RX ADMIN — SODIUM CHLORIDE, POTASSIUM CHLORIDE, SODIUM LACTATE AND CALCIUM CHLORIDE: 600; 310; 30; 20 INJECTION, SOLUTION INTRAVENOUS at 14:52

## 2024-05-09 RX ADMIN — Medication 8 ML: at 08:49

## 2024-05-09 RX ADMIN — DIPHENHYDRAMINE HYDROCHLORIDE 12.5 MG: 50 INJECTION INTRAMUSCULAR; INTRAVENOUS at 21:32

## 2024-05-09 RX ADMIN — BUPIVACAINE HYDROCHLORIDE 5 ML: 5 INJECTION, SOLUTION EPIDURAL; INTRACAUDAL at 12:47

## 2024-05-09 RX ADMIN — Medication 1 MILLI-UNITS/MIN: at 20:06

## 2024-05-09 RX ADMIN — Medication 25 MCG: at 04:45

## 2024-05-09 RX ADMIN — FENTANYL CITRATE 25 MCG: 50 INJECTION, SOLUTION INTRAMUSCULAR; INTRAVENOUS at 13:44

## 2024-05-09 RX ADMIN — Medication 15 ML/HR: at 17:18

## 2024-05-09 ASSESSMENT — PAIN DESCRIPTION - ORIENTATION: ORIENTATION: LOWER

## 2024-05-09 ASSESSMENT — PAIN SCALES - GENERAL
PAINLEVEL_OUTOF10: 10
PAINLEVEL_OUTOF10: 8

## 2024-05-09 ASSESSMENT — PAIN DESCRIPTION - LOCATION: LOCATION: ABDOMEN

## 2024-05-09 ASSESSMENT — PAIN DESCRIPTION - DESCRIPTORS: DESCRIPTORS: ACHING;CRAMPING

## 2024-05-09 ASSESSMENT — PAIN - FUNCTIONAL ASSESSMENT: PAIN_FUNCTIONAL_ASSESSMENT: ACTIVITIES ARE NOT PREVENTED

## 2024-05-09 NOTE — PROGRESS NOTES
Assumed patient care. Patient resting in bed, efm off at this time. Requesting to take a break from efm and start cytotec after eating. Discussed plan of care with KIM Jean-Baptiste.

## 2024-05-09 NOTE — PROGRESS NOTES
Called patient via hospital room telephone due to positive Covid Status to review home care services.     Patient will discharge with continued SN services provided by [facility] in addition to PT/OT/SW services.      Risk of readmission is: HIGH RISK 68% (based on Longitudinal Plan of Care score)     Address, phone number, and insurance verified with patient as per [facility] records.       Patient's support system is spouse and other family members in the home.      Patient agrees to resume [facility] services within 48 hours of hospital discharge. Patient states she is pleased with services and would like to resume prior caregivers if available.    The [facility] team will contact the patient to schedule next appointment.    ADDENDUM 1123:   Referral to [facility] reviewed and noted that it includes and order for HHA for personal cares.   MD and UDAY was notified that unfortunately at this time, [facility] is unable to offer a HHA for this patient in her zip code. MD informed that OT will be able to assist with bathing as needed.  MD noted the above information in secure chat. No changes were made to the referral order.     Tawanna Escalante RN   Home Care Liaison   [facility] [place]  [phone]         Pain 0/10

## 2024-05-09 NOTE — PROGRESS NOTES
[facility]  HEPATOLOGY PROGRESS NOTE  Stefani Crowell [id number]       IMPRESSION:  Stefani Crowell is a 61 year old female with a history of alcohol use disorder and alcohol associated cirrhosis, last use 6/28/23. Her liver disease has been complicated by ascites, diuretic refractory hepatic hydrothorax with 3x weekly thoracentesis, COPD, hyponatremia, HE, EV who was admitted following a thoracentesis and noted to have a serum sodium of 114. She has received 3% saline without significant improvement in serum sodium level.  She has been on a 2% sodium infusion with fluid restriction.     RECOMMENDATIONS:  # Hyponatremia  - has had improvement in sodium to 126, more stable.   - has not been on diuretics, fluid restriction at 1L. Protein supplements not included in restriction.   - Health psychology for mental health care.     # Hepatic hydrothorax  # Apical pneumothorax  # Ascites  - mild-moderate pneumothorax following thora 12/11. Pneumothorax stable 12/14. Continues to have mild dyspnea.  Had evidence of ascites with para 12/12 negative for SBP, low protein.  Repeat thora as needed, please send diagnostic sample with each thora/para.   - has needed x3 weekly thoras prior to admission, now with improvement. Discussed with family that she may tolerate twice weekly again.   - 25% albumin, at least 25 gms with each thora/para to prevent shifts.   - with hyponatremia, low total protein and borderline creatinine, she should received SBP ppx with Ciprofloxacin 250 mg daily.     # Alcohol associated cirrhosis  # Alcohol use disorder  - last use 6/28/23. Has been involved in CD program prior to worsening status. She reports wanting to complete her program and states she will do this when she is able.   - MELD 3.0 ~24. ABO A. Discussed in liver transplant conference 12/12. Re opening liver transplant evaluation. She still needs CTA coronary, planned for today. As OP, will need pap and mammogram.   Reiterated  Comfortable with second epidural  Cx 7cm/80/0  On left side  Had a 2 minute deceleration , then several late decelerations and variable  No pitocin running  Fht's 150's ,+btbv  Nurses emptying bladder and going to get patient on right side     "that opening eval does not equal transplant listing. Ordered transplant surgery consult.    - US abd w doppler 10/22 with patent vasculature, no lesions     # Hepatic encephalopathy  - mentation more stable, continues to have response out of context when asked targeted questions.   - continue with lactulose and rifaximin. Goal stool output with 3-5 soft/loose BM's per day.    # Debility  # Moderate protein calorie malnutrition  - encouraged up to chair at least 3x daily for 30-60 minutes and mobile.   - PT for strengthening.     Patient was discussed with attending physician, Dr. Sim.  The above note reflects our joint plan.     Next follow up appointment: Dr. Stephanie Lim 1/29/24    Thank you for the opportunity to be involved with  Stefani Serrano [facility]. Please call with any questions or concerns.    ROD Urban, CNP  Inpatient Hepatology [facility]        SUBJECTIVE:  Complains of right upper quadrant discomfort with deep breaths despite any position. No fevers, nausea, vomiting.     ROS:  A 10-point review of systems was negative.    OBJECTIVE:  VS: BP 96/61   Pulse 70   Temp 97.8  F (36.6  C) (Oral)   Resp 20   Ht 1.651 m (5' 5\")   Wt 71.1 kg (156 lb 12 oz)   LMP  (LMP Unknown)   SpO2 100%   BMI 26.08 kg/m       Intake/Output Summary (Last 24 hours) at 12/15/2023 1549  Last data filed at 12/15/2023 1300  Gross per 24 hour   Intake 1201.5 ml   Output 1400 ml   Net -198.5 ml      General: In no acute distress, mild facial muscle wasting  Neuro: AOx3, No asterixis  HEENT:  Noscleral icterus, Nooral lesions  CV:  Skin warm and dry  Lungs:  Respirations even and nonlabored on room air  Abd:   Nontender, mildly distended  Extrem:  trace  peripheral edema   Skin: Nojaundice  Psych: pleasant    MEDICATIONS:  Current Facility-Administered Medications   Medication    acetaminophen (TYLENOL) tablet 650 mg    Or    acetaminophen (TYLENOL) Suppository 650 mg    albuterol (PROVENTIL HFA/VENTOLIN HFA) inhaler    " albuterol (PROVENTIL) neb solution 2.5 mg    calcium carbonate (TUMS) chewable tablet 1,000 mg    citalopram (celeXA) tablet 40 mg    cyclobenzaprine (FLEXERIL) tablet 5-10 mg    diphenhydrAMINE (BENADRYL) capsule 25 mg    diphenhydrAMINE (BENADRYL) injection 25-50 mg    ferrous sulfate (FEROSUL) tablet 325 mg    fluticasone-vilanterol (BREO ELLIPTA) 100-25 MCG/ACT inhaler 1 puff    ivabradine (CORLANOR) tablet 5-15 mg    lactulose (CHRONULAC) solution 20 g    lactulose (CHRONULAC) solution 20 g    levothyroxine (SYNTHROID/LEVOTHROID) tablet 112 mcg    lidocaine (LMX4) cream    lidocaine 1 % 0.1-1 mL    LORazepam (ATIVAN) tablet 0.5 mg    melatonin tablet 5 mg    methylPREDNISolone sodium succinate (solu-MEDROL) injection 125 mg    metoprolol (LOPRESSOR) injection 5-15 mg    metoprolol tartrate (LOPRESSOR) tablet  mg    midodrine (PROAMATINE) tablet 15 mg    montelukast (SINGULAIR) tablet 10 mg    nitroGLYcerin (NITROSTAT) sublingual tablet 0.4-0.8 mg    ondansetron (ZOFRAN ODT) ODT tab 4 mg    Or    ondansetron (ZOFRAN) injection 4 mg    ondansetron (ZOFRAN) injection 4 mg    oxymetazoline (AFRIN) 0.05 % spray 2 spray    pantoprazole (PROTONIX) EC tablet 40 mg    prochlorperazine (COMPAZINE) injection 10 mg    Or    prochlorperazine (COMPAZINE) tablet 10 mg    Or    prochlorperazine (COMPAZINE) suppository 25 mg    rifaximin (XIFAXAN) tablet 550 mg    senna-docusate (SENOKOT-S/PERICOLACE) 8.6-50 MG per tablet 1 tablet    Or    senna-docusate (SENOKOT-S/PERICOLACE) 8.6-50 MG per tablet 2 tablet    sodium chloride (NEBUSAL) 3 % neb solution 3 mL    sodium chloride (PF) 0.9% PF flush 3 mL    sodium chloride (PF) 0.9% PF flush 3 mL       REVIEW OF LABORATORY, PATHOLOGY AND IMAGING RESULTS:  BMP  Recent Labs   Lab 12/15/23  0545 12/14/23  1134 12/14/23  0509 12/13/23  2022 12/13/23  1000 12/13/23  0418 12/12/23  1319 12/12/23  0539   * 124* 125*  125* 124*   < > 124*  124*   < > 126*  126*   POTASSIUM 4.7  " --  4.3 4.4  --  5.4*  --  5.6*   CHLORIDE 105  --  99  --   --  100  --  102   UMA 9.3  --  9.0  --   --  8.8  --  8.9   CO2 15*  --  18*  --   --  17*  --  17*   BUN 35.4*  --  27.6*  --   --  23.4*  --  26.5*   CR 0.64  --  0.58  --   --  0.59  --  0.64   GLC 94  --  78  --   --  104*  --  94    < > = values in this interval not displayed.     CBC  Recent Labs   Lab 12/15/23  0545 12/14/23  0509 12/13/23  0418 12/12/23  0539   WBC 6.0 6.3 7.7 6.9   RBC 2.09* 2.16* 2.24* 2.00*   HGB 6.7* 7.1* 7.3* 6.7*   HCT 21.0* 22.3* 22.6* 20.4*   * 103* 101* 102*   MCH 32.1 32.9 32.6 33.5*   MCHC 31.9 31.8 32.3 32.8   RDW 19.0* 18.4* 18.6* 17.3*   PLT 50* 47* 52* 49*     INR  Recent Labs   Lab 12/08/23  1547   INR 1.72*     LFTs  Recent Labs   Lab 12/15/23  0545 12/11/23  0812   ALKPHOS 68 67   AST 40 47*   ALT 34 36   BILITOTAL 4.5* 4.9*   PROTTOTAL 5.0* 5.0*   ALBUMIN 3.3* 3.4*        MELD 3.0: 24 at 12/9/2023 10:15 PM  MELD-Na: 27 at 12/9/2023 10:15 PM  Calculated from:  Serum Creatinine: 0.78 mg/dL (Using min of 1 mg/dL) at 12/9/2023  3:51 AM  Serum Sodium: 120 mmol/L (Using min of 125 mmol/L) at 12/9/2023 10:15 PM  Total Bilirubin: 3.9 mg/dL at 12/7/2023  6:14 AM  Serum Albumin: 3.5 g/dL at 12/7/2023  6:14 AM  INR(ratio): 1.72 at 12/8/2023  3:47 PM  Age at listing (hypothetical): 61 years  Sex: Female at 12/9/2023 10:15 PM    Previous work-up:   Lab Results   Component Value Date    HEPBANG Nonreactive 02/18/2021    HBCAB Nonreactive 02/18/2021    HCVAB  08/03/2017     Nonreactive   Assay performance characteristics have not been established for newborns,   infants, and children      TANYA 157 10/03/2023    IRONSAT 26 11/23/2023    TSH 3.98 12/06/2023    CHOL 176 08/15/2022    HDL 44 (L) 08/15/2022     (H) 08/15/2022    TRIG 32 12/06/2023    A1C 4.7 07/24/2023    POPETH <10 12/11/2023    PLPETH <10 12/11/2023      No results found for: \"SPECDES\", \"LDRESULTS\"      Imaging Results:  CXR 12/14/23  MPRESSION: " Continued right hydropneumothorax. Not significantly  changed from yesterday.

## 2024-05-09 NOTE — ANESTHESIA PROCEDURE NOTES
Epidural Block    Patient location during procedure: OB  Reason for block: labor epidural  Staffing  Performed: resident/CRNA   Resident/CRNA: Crystal Saleh APRN - CRNA  Performed by: Milind Damon APRN - CRNA  Authorized by: Amor Worley MD    Epidural  Patient position: sitting  Prep: ChloraPrep  Patient monitoring: continuous pulse ox and frequent blood pressure checks  Approach: midline  Location: L3-4  Injection technique: SIDDHARTHA air  Provider prep: mask and sterile gloves  Needle  Needle type: Tuohy   Needle gauge: 18 G  Needle length: 3.5 in  Needle insertion depth: 6 cm  Catheter type: end hole  Catheter size: 20 G (20g)  Catheter at skin depth: 12 cm  Test dose: negativeCatheter Secured: tegaderm and tape  Assessment  Hemodynamics: stable  Attempts: 1  Outcomes: uncomplicated and patient tolerated procedure well  Preanesthetic Checklist  Completed: patient identified, IV checked, site marked, risks and benefits discussed, surgical/procedural consents, equipment checked, pre-op evaluation, timeout performed, anesthesia consent given, oxygen available and monitors applied/VS acknowledged

## 2024-05-09 NOTE — PROGRESS NOTES
Rivka ALVAREZ at nurses station updated last cytotec was placed at 0445 SVE unchanged. Tracing reviewed.

## 2024-05-09 NOTE — ANESTHESIA PROCEDURE NOTES
CSE Block    Patient location during procedure: OB  Reason for block: labor epidural  Staffing  Performed: resident/CRNA   Resident/CRNA: Milind Damon APRN - PABLO  Performed by: Milind Damon APRN - CRNA  Authorized by: Amor Worley MD    CSE  Patient position: sitting  Prep: ChloraPrep  Patient monitoring: continuous pulse ox and frequent blood pressure checks  Approach: midline  Provider prep: mask and sterile gloves  Spinal Needle  Needle type: pencil-tip   Needle gauge: 27 G  Needle length: 5 inch.  Epidural Needle  Injection technique: SIDDHARTHA air  Needle type: Tuohy   Needle gauge: 18 G  Needle length: 3.5 in  Needle insertion depth: 6 cm  Location: lumbar (1-5)  Catheter  Catheter type: end hole  Catheter size: 29g.  Catheter at skin depth: 13 cm  Test dose: negative  Assessment  Hemodynamics: stable  Preanesthetic Checklist  Completed: patient identified, IV checked, site marked, risks and benefits discussed, surgical/procedural consents, equipment checked, pre-op evaluation, timeout performed, anesthesia consent given, oxygen available, monitors applied/VS acknowledged, fire risk safety assessment completed and verbalized and blood product R/B/A discussed and consented

## 2024-05-09 NOTE — PROGRESS NOTES
Dr. Negrete updated patient reporting \"severe constant back pain\" and requesting epidural, however refused SVE at this time. States patient may have epidural.

## 2024-05-09 NOTE — PROGRESS NOTES
Dr. Negrete called for bedside evaluation d/t sudden onset severe pain and vaginal bleeding. States he will come to bedside.

## 2024-05-09 NOTE — PROGRESS NOTES
Dr. Negrete at nurses' station, aware of marked variability followed by prolonged deceleration into 60s BPM for 2.5 minutes that resolved with position change. Reviewed FHT tracing. RN to continue current plan of care.

## 2024-05-10 PROBLEM — R80.9 URINE PROTEIN INCREASED: Status: RESOLVED | Noted: 2024-04-20 | Resolved: 2024-05-10

## 2024-05-10 PROBLEM — R42 DIZZINESS: Status: RESOLVED | Noted: 2024-01-05 | Resolved: 2024-05-10

## 2024-05-10 PROBLEM — R07.1 CHEST PAIN ON BREATHING: Status: RESOLVED | Noted: 2024-01-31 | Resolved: 2024-05-10

## 2024-05-10 PROBLEM — O16.2 ELEVATED BLOOD PRESSURE AFFECTING PREGNANCY IN SECOND TRIMESTER, ANTEPARTUM: Status: RESOLVED | Noted: 2024-01-31 | Resolved: 2024-05-10

## 2024-05-10 PROBLEM — Z31.5 ENCOUNTER FOR PROCREATIVE GENETIC COUNSELING: Status: RESOLVED | Noted: 2023-11-17 | Resolved: 2024-05-10

## 2024-05-10 PROBLEM — G43.909 MIGRAINE HEADACHE: Chronic | Status: RESOLVED | Noted: 2018-10-31 | Resolved: 2024-05-10

## 2024-05-10 PROBLEM — K51.00 PANCOLITIS (HCC): Status: RESOLVED | Noted: 2021-04-09 | Resolved: 2024-05-10

## 2024-05-10 PROBLEM — Z34.03 PRIMIGRAVIDA IN THIRD TRIMESTER: Status: ACTIVE | Noted: 2024-05-09

## 2024-05-10 PROBLEM — N83.201 CYST OF RIGHT OVARY: Status: RESOLVED | Noted: 2023-11-17 | Resolved: 2024-05-10

## 2024-05-10 LAB
ALBUMIN SERPL-MCNC: 2.6 G/DL (ref 3.5–5.2)
ALP SERPL-CCNC: 127 U/L (ref 35–104)
ALT SERPL-CCNC: 12 U/L (ref 0–32)
ANION GAP SERPL CALCULATED.3IONS-SCNC: 11 MMOL/L (ref 7–16)
AST SERPL-CCNC: 31 U/L (ref 0–31)
BASOPHILS # BLD: 0.08 K/UL (ref 0–0.2)
BASOPHILS NFR BLD: 0 % (ref 0–2)
BILIRUB SERPL-MCNC: 0.7 MG/DL (ref 0–1.2)
BUN SERPL-MCNC: 8 MG/DL (ref 6–20)
CALCIUM SERPL-MCNC: 8.3 MG/DL (ref 8.6–10.2)
CHLORIDE SERPL-SCNC: 105 MMOL/L (ref 98–107)
CO2 SERPL-SCNC: 22 MMOL/L (ref 22–29)
CREAT SERPL-MCNC: 0.8 MG/DL (ref 0.5–1)
EOSINOPHIL # BLD: 0.17 K/UL (ref 0.05–0.5)
EOSINOPHILS RELATIVE PERCENT: 1 % (ref 0–6)
ERYTHROCYTE [DISTWIDTH] IN BLOOD BY AUTOMATED COUNT: 14.1 % (ref 11.5–15)
GFR, ESTIMATED: >90 ML/MIN/1.73M2
GLUCOSE SERPL-MCNC: 143 MG/DL (ref 74–99)
HCT VFR BLD AUTO: 33 % (ref 34–48)
HGB BLD-MCNC: 10.5 G/DL (ref 11.5–15.5)
IMM GRANULOCYTES # BLD AUTO: 0.22 K/UL (ref 0–0.58)
IMM GRANULOCYTES NFR BLD: 1 % (ref 0–5)
LYMPHOCYTES NFR BLD: 2.23 K/UL (ref 1.5–4)
LYMPHOCYTES RELATIVE PERCENT: 11 % (ref 20–42)
MCH RBC QN AUTO: 28.8 PG (ref 26–35)
MCHC RBC AUTO-ENTMCNC: 31.8 G/DL (ref 32–34.5)
MCV RBC AUTO: 90.7 FL (ref 80–99.9)
MONOCYTES NFR BLD: 0.69 K/UL (ref 0.1–0.95)
MONOCYTES NFR BLD: 3 % (ref 2–12)
NEUTROPHILS NFR BLD: 84 % (ref 43–80)
NEUTS SEG NFR BLD: 17.9 K/UL (ref 1.8–7.3)
PLATELET # BLD AUTO: 243 K/UL (ref 130–450)
PMV BLD AUTO: 10.3 FL (ref 7–12)
POTASSIUM SERPL-SCNC: 3.3 MMOL/L (ref 3.5–5)
PROT SERPL-MCNC: 5.2 G/DL (ref 6.4–8.3)
RBC # BLD AUTO: 3.64 M/UL (ref 3.5–5.5)
SODIUM SERPL-SCNC: 138 MMOL/L (ref 132–146)
URATE SERPL-MCNC: 5.7 MG/DL (ref 2.4–5.7)
WBC OTHER # BLD: 21.3 K/UL (ref 4.5–11.5)

## 2024-05-10 PROCEDURE — 1220000000 HC SEMI PRIVATE OB R&B

## 2024-05-10 PROCEDURE — 6370000000 HC RX 637 (ALT 250 FOR IP): Performed by: OBSTETRICS & GYNECOLOGY

## 2024-05-10 PROCEDURE — 80053 COMPREHEN METABOLIC PANEL: CPT

## 2024-05-10 PROCEDURE — 99232 SBSQ HOSP IP/OBS MODERATE 35: CPT | Performed by: MIDWIFE

## 2024-05-10 PROCEDURE — 85025 COMPLETE CBC W/AUTO DIFF WBC: CPT

## 2024-05-10 PROCEDURE — 7200000001 HC VAGINAL DELIVERY

## 2024-05-10 PROCEDURE — 84550 ASSAY OF BLOOD/URIC ACID: CPT

## 2024-05-10 RX ORDER — ACETAMINOPHEN 500 MG
1000 TABLET ORAL EVERY 6 HOURS PRN
Status: DISCONTINUED | OUTPATIENT
Start: 2024-05-10 | End: 2024-05-11 | Stop reason: HOSPADM

## 2024-05-10 RX ORDER — ASPIRIN 81 MG/1
81 TABLET, CHEWABLE ORAL DAILY
Status: DISCONTINUED | OUTPATIENT
Start: 2024-05-10 | End: 2024-05-11 | Stop reason: HOSPADM

## 2024-05-10 RX ORDER — PRENATAL WITH FERROUS FUM AND FOLIC ACID 3080; 920; 120; 400; 22; 1.84; 3; 20; 10; 1; 12; 200; 27; 25; 2 [IU]/1; [IU]/1; MG/1; [IU]/1; MG/1; MG/1; MG/1; MG/1; MG/1; MG/1; UG/1; MG/1; MG/1; MG/1; MG/1
1 TABLET ORAL
Status: DISCONTINUED | OUTPATIENT
Start: 2024-05-10 | End: 2024-05-11 | Stop reason: HOSPADM

## 2024-05-10 RX ORDER — MODIFIED LANOLIN
OINTMENT (GRAM) TOPICAL PRN
Status: DISCONTINUED | OUTPATIENT
Start: 2024-05-10 | End: 2024-05-11 | Stop reason: HOSPADM

## 2024-05-10 RX ORDER — IBUPROFEN 600 MG/1
600 TABLET ORAL EVERY 6 HOURS PRN
Status: DISCONTINUED | OUTPATIENT
Start: 2024-05-10 | End: 2024-05-11 | Stop reason: HOSPADM

## 2024-05-10 RX ORDER — DOCUSATE SODIUM 100 MG/1
100 CAPSULE, LIQUID FILLED ORAL 2 TIMES DAILY
Status: DISCONTINUED | OUTPATIENT
Start: 2024-05-10 | End: 2024-05-11 | Stop reason: HOSPADM

## 2024-05-10 RX ADMIN — DOCUSATE SODIUM 100 MG: 100 CAPSULE, LIQUID FILLED ORAL at 09:33

## 2024-05-10 RX ADMIN — DOCUSATE SODIUM 100 MG: 100 CAPSULE, LIQUID FILLED ORAL at 21:29

## 2024-05-10 RX ADMIN — PRENATAL WITH FERROUS FUM AND FOLIC ACID 1 TABLET: 3080; 920; 120; 400; 22; 1.84; 3; 20; 10; 1; 12; 200; 27; 25; 2 TABLET ORAL at 09:33

## 2024-05-10 RX ADMIN — IBUPROFEN 600 MG: 600 TABLET, FILM COATED ORAL at 15:29

## 2024-05-10 RX ADMIN — IBUPROFEN 600 MG: 600 TABLET, FILM COATED ORAL at 09:33

## 2024-05-10 RX ADMIN — ACETAMINOPHEN 1000 MG: 500 TABLET ORAL at 02:42

## 2024-05-10 RX ADMIN — ACETAMINOPHEN 1000 MG: 500 TABLET ORAL at 18:02

## 2024-05-10 RX ADMIN — IBUPROFEN 600 MG: 600 TABLET, FILM COATED ORAL at 21:29

## 2024-05-10 RX ADMIN — ASPIRIN 81 MG CHEWABLE TABLET 81 MG: 81 TABLET CHEWABLE at 09:33

## 2024-05-10 ASSESSMENT — PAIN DESCRIPTION - LOCATION
LOCATION: VAGINA;BACK
LOCATION: ABDOMEN;PERINEUM
LOCATION: ABDOMEN
LOCATION: ABDOMEN

## 2024-05-10 ASSESSMENT — PAIN - FUNCTIONAL ASSESSMENT
PAIN_FUNCTIONAL_ASSESSMENT: ACTIVITIES ARE NOT PREVENTED

## 2024-05-10 ASSESSMENT — PAIN DESCRIPTION - DESCRIPTORS
DESCRIPTORS: SORE;CRAMPING
DESCRIPTORS: CRAMPING
DESCRIPTORS: CRAMPING
DESCRIPTORS: ACHING;DISCOMFORT

## 2024-05-10 ASSESSMENT — PAIN DESCRIPTION - ORIENTATION
ORIENTATION: LOWER
ORIENTATION: RIGHT;LEFT
ORIENTATION: LOWER
ORIENTATION: RIGHT;LEFT

## 2024-05-10 ASSESSMENT — PAIN SCALES - GENERAL
PAINLEVEL_OUTOF10: 3
PAINLEVEL_OUTOF10: 4
PAINLEVEL_OUTOF10: 2
PAINLEVEL_OUTOF10: 1
PAINLEVEL_OUTOF10: 3

## 2024-05-10 NOTE — PROGRESS NOTES
[employer] screening results were discussed with parent. Questions answered. Brochure given to parent. Advised to monitor developmental milestones and contact physician for any concerns.   Jos Mcknight

## 2024-05-10 NOTE — L&D DELIVERY NOTE
Department of Obstetrics and Gynecology  Spontaneous Vaginal Delivery Note    Patient:  Lex Tellez     Admit Date:  2024 10:27 PM  Medical Record Number:  [id number]   Procedure Date: 5/10/2024 1:50 AM     Pre-delivery Diagnosis:    IUP at 39 weeks 5 days, gestational hypertension, meconium fluid, intramural leiomyoma (posterior wall by cervix, 5 cm), short femur of fetus, low ferritin  Patient Active Problem List   Diagnosis    History of syncope    Intramural leiomyoma of uterus    Low ferritin    Gestational hypertension, third trimester    Short femur of fetus on prenatal ultrasound    Elevated blood pressure affecting pregnancy in third trimester, antepartum    39 5/7 weeks gestation of pregnancy    Primigravida in third trimester    Meconium in amniotic fluid     Post-delivery Diagnosis:  Living  infant Male, bilateral vaginal wall lacerations  Patient Active Problem List   Diagnosis    History of syncope    Intramural leiomyoma of uterus    Low ferritin    Gestational hypertension, third trimester    Short femur of fetus on prenatal ultrasound    Elevated blood pressure affecting pregnancy in third trimester, antepartum    39 5/7 weeks gestation of pregnancy    Primigravida in third trimester    Meconium in amniotic fluid     (normal spontaneous vaginal delivery)    Term birth of  male    Obstetric bilateral vaginal wall laceration without perineal laceration     Information for the patient's :  Geoff Rey Burnett N [[id number]]   Normal Spontaneous Vaginal Delivery, uncomplicated     Delivering Clinician: Wilton Berry Wt:   Information for the patient's :  Rey Tellez Lex ROA [[id number]]   8 pounds 7 ounces  3827 g     APGARS:     Information for the patient's :  Geoff Rey ROA [[id number]]   1 minute: 8  5 minute: 9    Anesthesia:  epidural anesthesia; 1% lidocaine locally for repair    Application and Delivery:  29 y.o. W female  at 39w5d  Vertex  Position: Left  _: Occiput  _: Anterior    Shoulder Dystocia    Shoulder Dystocia Present?: No    Assisted Delivery Details    Forceps Attempted?: No  Vacuum Extractor Attempted?: No    Cord    Vessels: 3 Vessels  Complications: None  Delayed Cord Clamping?: Yes  Cord Clamped Date/Time: 2024 23:50:00  Cord Blood Disposition: Lab  Gases Sent?: Yes    Placenta    Date/Time: 2024 23:55:00  Removal: Spontaneous  Appearance: Intact  Disposition: Placenta Refrigerator    Lacerations    Episiotomy: None  Perineal Lacerations: None  Other Lacerations: vaginal laceration  Vaginal Laceration?: Yes Repaired?: Yes   Number of Repair Packets: 2       Vaginal Counts    Initial Count Personnel: DMITRI  Intial Sponge Count: Correct Intial Needles Count: Correct Intial Instruments Count: Correct   Final Sponges Count: Correct Final Needles  Count: Correct Final Instruments Count: Correct   Final Count Personnel: DMITRI/ ANAHI  Accurate Final Count?: Yes       Blood Loss  Mother: Lex Tellez N #[id number]     Start of Mother's Information      Delivery Blood Loss  24 1142 - 05/10/24 0214      Quantitative Blood Loss (mL) Hospital Encounter 100 grams    Total  100 mL     End of Mother's Information  Mother: Lex Tellez N #[id number]      Delivery Providers    Delivering clinician: Eitan Núñez MD     Provider Role    Dianna Sabillon DO Obstetrician    Cherry Anguiano RN Primary Nurse          Assessment    Living Status: Living        Skin Color:   Heart Rate:   Reflex Irritability:   Muscle Tone:   Respiratory Effort:   Total:            1 Minute:    1    2    1    2    2    8         5 Minute:    1    2    2    2    2    9                                        Apgars Assigned By: DMITRI         Resuscitation    Method: Bulb Suction, Stimulation, Room Air                               [place] Measurements      Birth Weight: 3827 g   Birth Length: 55.9 cm     Head Circumference: 35.6 cm

## 2024-05-10 NOTE — PROGRESS NOTES
Patient admitted to room 313. New admission assessment and vitals as charted. New admission paperwork gone over with the patient, including Tdap, and hepatitis B vaccines. Patient had the Tdap vaccine during pregnancy. Safe sleep poster gone over with patient. Ppd and discharge videos given and explained to patient. All questions answered at this time

## 2024-05-10 NOTE — PROGRESS NOTES
on PPD#1    S:  Feels sore at epidural site, cramping, perineum  Also has a slight headache  Motrin was effective but she hasn't taken it since 2am  Breastfeeding  Bleeding mod, no clots    O:  Continued mild-range BP  Breasts soft  Abdomen soft, fundus firm @U    A:  PPD#1  Gestational hypertension    P:  Repeat preeclampsia labs

## 2024-05-10 NOTE — ANESTHESIA POSTPROCEDURE EVALUATION
Department of Anesthesiology  Postprocedure Note    Patient: Lex Tellez  MRN: [id number]  YOB: 1995  Date of evaluation: 5/10/2024    Procedure Summary       Date: 05/09/24 Room / Location:     Anesthesia Start: 0841 Anesthesia Stop: 2349    Procedure: Labor Analgesia Diagnosis:     Scheduled Providers:  Responsible Provider: Amor Worley MD    Anesthesia Type: Not recorded ASA Status: Not recorded            Anesthesia Type: No value filed.    Ashley Phase I:      Ashley Phase II:      Anesthesia Post Evaluation    Patient location during evaluation: bedside  Patient participation: complete - patient participated  Level of consciousness: awake and alert  Pain score: 4  Airway patency: patent  Nausea & Vomiting: no nausea and no vomiting  Cardiovascular status: blood pressure returned to baseline and hemodynamically stable  Respiratory status: acceptable  Hydration status: stable  Pain management: adequate        No notable events documented.

## 2024-05-10 NOTE — LACTATION NOTE
Encouraged skin to skin and frequent attempts at breast to stimulate milk production. Instructed on normal infant behavior in the first 12-24 hours and importance of stimulating the baby frequently to eat during this time. Reviewed hand expression, and encouraged to hand express drops of colostrum when baby is sleepy. Instructed that baby may also feed 8-12 times a day- cluster feeding at times- as her milk supply is being established. Instructed on hunger cues and waking techniques to try. Reviewed signs of adequate I & O; allow baby to feed ad chuy and not to limit time at breast. Breastfeeding booklet provided with review of its contents. Encouraged to call with any concerns.     Nohelia Swift, CLS

## 2024-05-10 NOTE — PROGRESS NOTES
Pt moved to room 313 in mom baby. Pt told to not get up without help. Call light in reach. Baby in crib. Went over safe sleep. Pt does not want baby to leave her room. Gave report to nursing staff

## 2024-05-11 VITALS
RESPIRATION RATE: 16 BRPM | BODY MASS INDEX: 32.28 KG/M2 | DIASTOLIC BLOOD PRESSURE: 86 MMHG | SYSTOLIC BLOOD PRESSURE: 140 MMHG | HEIGHT: 68 IN | HEART RATE: 74 BPM | WEIGHT: 213 LBS | OXYGEN SATURATION: 99 % | TEMPERATURE: 97 F

## 2024-05-11 PROCEDURE — 99238 HOSP IP/OBS DSCHRG MGMT 30/<: CPT | Performed by: STUDENT IN AN ORGANIZED HEALTH CARE EDUCATION/TRAINING PROGRAM

## 2024-05-11 PROCEDURE — 6370000000 HC RX 637 (ALT 250 FOR IP): Performed by: OBSTETRICS & GYNECOLOGY

## 2024-05-11 RX ORDER — IBUPROFEN 600 MG/1
600 TABLET ORAL EVERY 6 HOURS PRN
Qty: 60 TABLET | Refills: 1 | Status: SHIPPED | OUTPATIENT
Start: 2024-05-11

## 2024-05-11 RX ADMIN — PRENATAL WITH FERROUS FUM AND FOLIC ACID 1 TABLET: 3080; 920; 120; 400; 22; 1.84; 3; 20; 10; 1; 12; 200; 27; 25; 2 TABLET ORAL at 08:07

## 2024-05-11 RX ADMIN — ASPIRIN 81 MG CHEWABLE TABLET 81 MG: 81 TABLET CHEWABLE at 08:07

## 2024-05-11 RX ADMIN — IBUPROFEN 600 MG: 600 TABLET, FILM COATED ORAL at 11:46

## 2024-05-11 RX ADMIN — DOCUSATE SODIUM 100 MG: 100 CAPSULE, LIQUID FILLED ORAL at 08:07

## 2024-05-11 RX ADMIN — IBUPROFEN 600 MG: 600 TABLET, FILM COATED ORAL at 03:41

## 2024-05-11 RX ADMIN — ACETAMINOPHEN 1000 MG: 500 TABLET ORAL at 08:07

## 2024-05-11 RX ADMIN — ACETAMINOPHEN 1000 MG: 500 TABLET ORAL at 14:57

## 2024-05-11 ASSESSMENT — PAIN - FUNCTIONAL ASSESSMENT: PAIN_FUNCTIONAL_ASSESSMENT: ACTIVITIES ARE NOT PREVENTED

## 2024-05-11 ASSESSMENT — PAIN SCALES - GENERAL
PAINLEVEL_OUTOF10: 4
PAINLEVEL_OUTOF10: 7
PAINLEVEL_OUTOF10: 4
PAINLEVEL_OUTOF10: 4

## 2024-05-11 ASSESSMENT — PAIN DESCRIPTION - DESCRIPTORS
DESCRIPTORS: ACHING;CRAMPING;DISCOMFORT
DESCRIPTORS: CRAMPING

## 2024-05-11 ASSESSMENT — PAIN DESCRIPTION - ORIENTATION: ORIENTATION: LOWER

## 2024-05-11 ASSESSMENT — PAIN DESCRIPTION - LOCATION: LOCATION: ABDOMEN

## 2024-05-11 NOTE — DISCHARGE SUMMARY
Obstetrical Discharge Form         Patients Name  Lex Tellez    Gestational Age:  39w5d    Antepartum complications: gestational HTN, intramural fibroid, short femur of fetus, low ferritin     Date of Delivery:   2024       11:49 PM      Type of Delivery:   Vaginal, Spontaneous [250]   Rupture Date/time:    2024      11:42 AM     Presentation:    Vertex [1]   Position:   Occiput [1]       Left [1]       Anterior [1]     Anesthesia:    Epidural [254];Local [251]     Feeding method:         Delivered By:   JON CHRISTIAN     Baby:       Information for the patient's :  Rey Tellez [[id number]]        Intrapartum complications: None  Postpartum complications: mildly elevated BP, PIH labs WNL, no severe range pressures postpartum  Discharge Date:   2024  Discharge Condition: Stable  Disposition:   Home    Plan:   Follow up    in 6 weeks or sooner is issues arise  -continue ASA at home  -continue monitoring BP at home and return with any preeclamptic signs/symptoms, any severe range pressures

## 2024-05-11 NOTE — DISCHARGE INSTRUCTIONS
Follow-up with your OB doctor in 1 week if  delivery or in  6 weeks for vaginal delivery unless otherwise instructed.   Call office for an appointment.  For breastfeeding support, you can contact our lactation specialists at [phone] or [phone]    DIET  Eat a well balanced diet focusing on foods high in fiber and protein  Drink plenty of fluids especially water.  To avoid constipation you may take a mild stool softener as recommended by your doctor or midwife.    ACTIVITY  Gradually increase your activity.  Resume exercise regimen only after advised by your doctor or midwife.  Avoid lifting anything heavier than your baby or a gallon of milk for SIX weeks.   Avoid driving until your doctor or midwife has given their approval.  Rise slowly from a lying to sitting and then a standing position.  Climb stairs one at a time.  Use caution when carrying your baby up and down the stairs.  No sexual activity for 6 weeks or until advised by your doctor - Nothing in vagina: intercourse, tampons, or douching.   Be prepared to discuss family planning at your follow-up OB visit.   You may feel tired or have a lack of energy.  You may continue your prenatal vitamin to replenish nutrients post delivery.  Nap when baby naps to catch up on sleep.  May return to work or school in 6 weeks or as directed by OB.     EMOTIONS  You may feed goel, sad, teary, & overwhelmed.  Contact your OB provider if you feel you may be showing signs of postpartum depression, or have thoughts of harming yourself or your infant.  If infant will not stop crying, contact another adult for help or place infant in their crib on their back and take a break.  NEVER shake your infant.      BLEEDING  Vaginal bleeding will decrease in amount over the next few weeks.  You will notice that as your activity increases, your flow may increase.  This is your body's way of telling you, you need to take things easier and rest more often.  Call your OB/ER

## 2024-05-11 NOTE — LACTATION NOTE
Nurse helped mom with last feeding, nursed well with a nipple shield. Encouraged mom to call me throughout the day for help. Encouraged frequent feeds to establish milk supply. Reviewed benefits and safety of skin to skin. Inst on adequate I/O and importance of keeping track of diapers at home. Instructed on signs of dehydration such as infant refusing to feed, decreased wet diapers and infant becoming listless and notify provider if these occur. Reviewed with mom the importance of notifying the physician if baby looks more jaundiced. Lactation office # given if follow-up needed, as well as support group information. Encouraged to call with any concerns. Support and encouragement given.

## 2024-05-11 NOTE — PLAN OF CARE
Problem: Discharge Planning  Goal: Discharge to home or other facility with appropriate resources  Outcome: Completed     Problem: Vaginal Birth or  Section  Goal: Fetal and maternal status remain reassuring during the birth process  Description:  Birth OB-Pregnancy care plan goal which identifies if the fetal and maternal status remain reassuring during the birth process  Outcome: Completed     Problem: Postpartum  Goal: Experiences normal postpartum course  Description:  Postpartum OB-Pregnancy care plan goal which identifies if the mother is experiencing a normal postpartum course  Outcome: Completed  Goal: Appropriate maternal -  bonding  Description:  Postpartum OB-Pregnancy care plan goal which identifies if the mother and  are bonding appropriately  Outcome: Completed  Goal: Establishment of infant feeding pattern  Description:  Postpartum OB-Pregnancy care plan goal which identifies if the mother is establishing a feeding pattern with their   Outcome: Completed  Goal: Incisions, wounds, or drain sites healing without S/S of infection  Outcome: Completed     Problem: Pain  Goal: Verbalizes/displays adequate comfort level or baseline comfort level  Outcome: Completed     Problem: Infection - Adult  Goal: Absence of infection at discharge  Outcome: Completed  Goal: Absence of infection during hospitalization  Outcome: Completed  Goal: Absence of fever/infection during anticipated neutropenic period  Outcome: Completed

## 2024-05-11 NOTE — PROGRESS NOTES
Jaclyn discharge completed and verified. Discharge instructions read to mother regarding herself and  care. Mother states she has a safe place for baby to sleep. Prescription understood. Verbalized understanding. No further questions at this time. ID bands on baby verified with mothers. Hugs tag removed. Patient discharged via wheelchair holding baby in car seat.

## 2024-05-13 ENCOUNTER — HOSPITAL ENCOUNTER (INPATIENT)
Age: 29
LOS: 2 days | Discharge: HOME | DRG: 776 | End: 2024-05-15
Attending: OBSTETRICS & GYNECOLOGY | Admitting: OBSTETRICS & GYNECOLOGY
Payer: COMMERCIAL

## 2024-05-13 LAB
ALBUMIN SERPL-MCNC: 3.5 G/DL (ref 3.5–5.2)
ALP SERPL-CCNC: 116 U/L (ref 35–104)
ALT SERPL-CCNC: 22 U/L (ref 0–32)
ANION GAP SERPL CALCULATED.3IONS-SCNC: 10 MMOL/L (ref 7–16)
AST SERPL-CCNC: 31 U/L (ref 0–31)
BASOPHILS # BLD: 0.11 K/UL (ref 0–0.2)
BASOPHILS NFR BLD: 1 % (ref 0–2)
BILIRUB SERPL-MCNC: 0.2 MG/DL (ref 0–1.2)
BUN SERPL-MCNC: 15 MG/DL (ref 6–20)
CALCIUM SERPL-MCNC: 10 MG/DL (ref 8.6–10.2)
CHLORIDE SERPL-SCNC: 103 MMOL/L (ref 98–107)
CO2 SERPL-SCNC: 25 MMOL/L (ref 22–29)
CREAT SERPL-MCNC: 0.9 MG/DL (ref 0.5–1)
CREAT UR-MCNC: 143.6 MG/DL (ref 29–226)
EOSINOPHIL # BLD: 0.98 K/UL (ref 0.05–0.5)
EOSINOPHILS RELATIVE PERCENT: 10 % (ref 0–6)
ERYTHROCYTE [DISTWIDTH] IN BLOOD BY AUTOMATED COUNT: 13.3 % (ref 11.5–15)
GFR, ESTIMATED: 89 ML/MIN/1.73M2
GLUCOSE SERPL-MCNC: 89 MG/DL (ref 74–99)
HCT VFR BLD AUTO: 35.8 % (ref 34–48)
HGB BLD-MCNC: 11.5 G/DL (ref 11.5–15.5)
IMM GRANULOCYTES # BLD AUTO: 0.08 K/UL (ref 0–0.58)
IMM GRANULOCYTES NFR BLD: 1 % (ref 0–5)
LYMPHOCYTES NFR BLD: 3 K/UL (ref 1.5–4)
LYMPHOCYTES RELATIVE PERCENT: 29 % (ref 20–42)
MCH RBC QN AUTO: 28.2 PG (ref 26–35)
MCHC RBC AUTO-ENTMCNC: 32.1 G/DL (ref 32–34.5)
MCV RBC AUTO: 87.7 FL (ref 80–99.9)
MONOCYTES NFR BLD: 0.6 K/UL (ref 0.1–0.95)
MONOCYTES NFR BLD: 6 % (ref 2–12)
NEUTROPHILS NFR BLD: 53 % (ref 43–80)
NEUTS SEG NFR BLD: 5.47 K/UL (ref 1.8–7.3)
PLATELET # BLD AUTO: 405 K/UL (ref 130–450)
PMV BLD AUTO: 9.7 FL (ref 7–12)
POTASSIUM SERPL-SCNC: 3.8 MMOL/L (ref 3.5–5)
PROT SERPL-MCNC: 6.7 G/DL (ref 6.4–8.3)
RBC # BLD AUTO: 4.08 M/UL (ref 3.5–5.5)
SODIUM SERPL-SCNC: 138 MMOL/L (ref 132–146)
TOTAL PROTEIN, URINE: 10 MG/DL (ref 0–12)
URINE TOTAL PROTEIN CREATININE RATIO: 0.07 (ref 0–0.2)
WBC OTHER # BLD: 10.2 K/UL (ref 4.5–11.5)

## 2024-05-13 PROCEDURE — 84156 ASSAY OF PROTEIN URINE: CPT

## 2024-05-13 PROCEDURE — 99222 1ST HOSP IP/OBS MODERATE 55: CPT | Performed by: MIDWIFE

## 2024-05-13 PROCEDURE — 84550 ASSAY OF BLOOD/URIC ACID: CPT

## 2024-05-13 PROCEDURE — 80053 COMPREHEN METABOLIC PANEL: CPT

## 2024-05-13 PROCEDURE — 85025 COMPLETE CBC W/AUTO DIFF WBC: CPT

## 2024-05-13 PROCEDURE — 1220000000 HC SEMI PRIVATE OB R&B

## 2024-05-13 PROCEDURE — 6370000000 HC RX 637 (ALT 250 FOR IP): Performed by: MIDWIFE

## 2024-05-13 PROCEDURE — 6360000002 HC RX W HCPCS: Performed by: MIDWIFE

## 2024-05-13 PROCEDURE — 82570 ASSAY OF URINE CREATININE: CPT

## 2024-05-13 RX ORDER — NIFEDIPINE 30 MG
30 TABLET, EXTENDED RELEASE ORAL DAILY
Status: DISCONTINUED | OUTPATIENT
Start: 2024-05-13 | End: 2024-05-15 | Stop reason: HOSPADM

## 2024-05-13 RX ORDER — SODIUM CHLORIDE 0.9 % (FLUSH) 0.9 %
5-40 SYRINGE (ML) INJECTION PRN
Status: DISCONTINUED | OUTPATIENT
Start: 2024-05-13 | End: 2024-05-15 | Stop reason: HOSPADM

## 2024-05-13 RX ORDER — SODIUM CHLORIDE, SODIUM LACTATE, POTASSIUM CHLORIDE, CALCIUM CHLORIDE 600; 310; 30; 20 MG/100ML; MG/100ML; MG/100ML; MG/100ML
INJECTION, SOLUTION INTRAVENOUS CONTINUOUS
Status: DISCONTINUED | OUTPATIENT
Start: 2024-05-13 | End: 2024-05-15

## 2024-05-13 RX ORDER — CALCIUM GLUCONATE 94 MG/ML
1000 INJECTION, SOLUTION INTRAVENOUS PRN
Status: DISCONTINUED | OUTPATIENT
Start: 2024-05-13 | End: 2024-05-15 | Stop reason: HOSPADM

## 2024-05-13 RX ORDER — SODIUM CHLORIDE 0.9 % (FLUSH) 0.9 %
5-40 SYRINGE (ML) INJECTION EVERY 12 HOURS SCHEDULED
Status: DISCONTINUED | OUTPATIENT
Start: 2024-05-13 | End: 2024-05-15 | Stop reason: HOSPADM

## 2024-05-13 RX ORDER — IBUPROFEN 600 MG/1
600 TABLET ORAL EVERY 6 HOURS PRN
Status: DISCONTINUED | OUTPATIENT
Start: 2024-05-13 | End: 2024-05-15 | Stop reason: HOSPADM

## 2024-05-13 RX ORDER — MAGNESIUM SULFATE HEPTAHYDRATE 40 MG/ML
4000 INJECTION, SOLUTION INTRAVENOUS ONCE
Status: COMPLETED | OUTPATIENT
Start: 2024-05-13 | End: 2024-05-13

## 2024-05-13 RX ORDER — SODIUM CHLORIDE 9 MG/ML
INJECTION, SOLUTION INTRAVENOUS PRN
Status: DISCONTINUED | OUTPATIENT
Start: 2024-05-13 | End: 2024-05-15 | Stop reason: HOSPADM

## 2024-05-13 RX ORDER — ACETAMINOPHEN 325 MG/1
650 TABLET ORAL EVERY 6 HOURS PRN
Status: DISCONTINUED | OUTPATIENT
Start: 2024-05-13 | End: 2024-05-15 | Stop reason: HOSPADM

## 2024-05-13 RX ADMIN — NIFEDIPINE 30 MG: 30 TABLET, EXTENDED RELEASE ORAL at 22:17

## 2024-05-13 RX ADMIN — MAGNESIUM SULFATE HEPTAHYDRATE 2000 MG/HR: 40 INJECTION, SOLUTION INTRAVENOUS at 22:42

## 2024-05-13 RX ADMIN — MAGNESIUM SULFATE HEPTAHYDRATE 4000 MG: 40 INJECTION, SOLUTION INTRAVENOUS at 22:19

## 2024-05-14 LAB — URATE SERPL-MCNC: 6.3 MG/DL (ref 2.4–5.7)

## 2024-05-14 PROCEDURE — 1220000000 HC SEMI PRIVATE OB R&B

## 2024-05-14 PROCEDURE — 6370000000 HC RX 637 (ALT 250 FOR IP): Performed by: MIDWIFE

## 2024-05-14 PROCEDURE — 6360000002 HC RX W HCPCS: Performed by: MIDWIFE

## 2024-05-14 PROCEDURE — 99232 SBSQ HOSP IP/OBS MODERATE 35: CPT | Performed by: STUDENT IN AN ORGANIZED HEALTH CARE EDUCATION/TRAINING PROGRAM

## 2024-05-14 PROCEDURE — 2580000003 HC RX 258: Performed by: MIDWIFE

## 2024-05-14 RX ORDER — LABETALOL HYDROCHLORIDE 5 MG/ML
20 INJECTION, SOLUTION INTRAVENOUS
Status: ACTIVE | OUTPATIENT
Start: 2024-05-14 | End: 2024-05-15

## 2024-05-14 RX ORDER — NIFEDIPINE 10 MG/1
20 CAPSULE ORAL
Status: DISCONTINUED | OUTPATIENT
Start: 2024-05-14 | End: 2024-05-15 | Stop reason: HOSPADM

## 2024-05-14 RX ORDER — NIFEDIPINE 10 MG/1
10 CAPSULE ORAL
Status: ACTIVE | OUTPATIENT
Start: 2024-05-14 | End: 2024-05-15

## 2024-05-14 RX ADMIN — MAGNESIUM SULFATE HEPTAHYDRATE 2000 MG/HR: 40 INJECTION, SOLUTION INTRAVENOUS at 08:45

## 2024-05-14 RX ADMIN — IBUPROFEN 600 MG: 600 TABLET, FILM COATED ORAL at 08:05

## 2024-05-14 RX ADMIN — SODIUM CHLORIDE, POTASSIUM CHLORIDE, SODIUM LACTATE AND CALCIUM CHLORIDE: 600; 310; 30; 20 INJECTION, SOLUTION INTRAVENOUS at 11:32

## 2024-05-14 RX ADMIN — NIFEDIPINE 30 MG: 30 TABLET, EXTENDED RELEASE ORAL at 08:46

## 2024-05-14 ASSESSMENT — PAIN SCALES - GENERAL: PAINLEVEL_OUTOF10: 7

## 2024-05-14 ASSESSMENT — PAIN DESCRIPTION - DESCRIPTORS: DESCRIPTORS: ACHING

## 2024-05-14 ASSESSMENT — PAIN DESCRIPTION - LOCATION: LOCATION: HEAD

## 2024-05-14 NOTE — PROGRESS NOTES
Lex is PPD#6, admitted for postpartum preeclampsia with sever features  Uric acid elevated at 6.3  HA, eye disturbances   Started on MAG  Was on nifedipine 30 mg, Bps still not controlled  Will continue mag x 24 hours  Will inc nifedipine to 60 mg daily   Continue close monitoring

## 2024-05-14 NOTE — PROGRESS NOTES
Patient presented to antepartum with high blood pressures at home. Patient had a vaginal delivery on 5/9/24. The patients assessment is as charted and her call light is within reach.    Ventolin 90 mcg   36 0 gm   rf x 4  [facility]

## 2024-05-14 NOTE — H&P
Department of Obstetrics and Gynecology  Nurse Practitioner Obstetrics History and Physical        CHIEF COMPLAINT:  headache, elevated blood pressure    HISTORY OF PRESENT ILLNESS:  Lex Tellez is a 29 y.o. female , 5 days postpartum vaginal birth    Presents to L&D with headache and elevated blood pressure.  Patient states HA started today, she has been alternating tylenol with ibuprofen without relief, rates 5/10.  Also having some visual disturbances in left eye.  Bleeding moderate, some small clots.  Breastfeeding well.            OB History          1    Para   1    Term   1       0    AB   0    Living   1         SAB   0    IAB   0    Ectopic   0    Molar   0    Multiple   0    Live Births   1                Estimated Due Date: Estimated Date of Delivery: 24      Pregnancy complicated by:   Patient Active Problem List   Diagnosis Code    History of syncope Z87.898    Intramural leiomyoma of uterus D25.1    Low ferritin R79.0    Gestational hypertension, third trimester O13.3    Short femur of fetus on prenatal ultrasound O35.HXX0    Elevated blood pressure affecting pregnancy in third trimester, antepartum O16.3    39 5/7 weeks gestation of pregnancy Z3A.39    Primigravida in third trimester Z34.03    Meconium in amniotic fluid P96.83     (normal spontaneous vaginal delivery) O80    Term birth of  male Z37.0    Obstetric bilateral vaginal wall laceration without perineal laceration O71.4    Preeclampsia in postpartum period O14.95           PAST OB HISTORY  OB History          1    Para   1    Term   1       0    AB   0    Living   1         SAB   0    IAB   0    Ectopic   0    Molar   0    Multiple   0    Live Births   1                  Past Medical History:          Diagnosis Date    Colitis 2021    enlarged colon     Migraine 2017    Less frequent, only occasional, usually stress related    Migraine headache 10/31/2018    Preeclampsia in

## 2024-05-15 VITALS
TEMPERATURE: 98.2 F | HEART RATE: 84 BPM | DIASTOLIC BLOOD PRESSURE: 75 MMHG | WEIGHT: 213 LBS | OXYGEN SATURATION: 98 % | SYSTOLIC BLOOD PRESSURE: 133 MMHG | BODY MASS INDEX: 32.28 KG/M2 | RESPIRATION RATE: 16 BRPM | HEIGHT: 68 IN

## 2024-05-15 PROCEDURE — 6370000000 HC RX 637 (ALT 250 FOR IP): Performed by: MIDWIFE

## 2024-05-15 RX ORDER — NIFEDIPINE 30 MG
30 TABLET, EXTENDED RELEASE ORAL DAILY
Qty: 30 TABLET | Refills: 3 | Status: SHIPPED | OUTPATIENT
Start: 2024-05-16

## 2024-05-15 RX ADMIN — NIFEDIPINE 30 MG: 30 TABLET, EXTENDED RELEASE ORAL at 08:58

## 2024-05-15 NOTE — PROGRESS NOTES
Post-Partum Note      S:  Patient completed magnesium sulfate for post-partum preeclampsia. Is currently on Procardia 30mg XL.  Denies headache or blurred vision.     O: BP (!) 138/91   Pulse 79   Temp 98.7 °F (37.1 °C) (Oral)   Resp 16   Ht 1.727 m (5' 8\")   Wt 96.6 kg (213 lb)   LMP 2023   SpO2 98%   BMI 32.39 kg/m²   BP after taking procardia this am: 133/75               EXT:     No Aida's.  Nl DTR's, no clonus       IMP:  1.  PPD# 5, s/p resolved post-partum preeclampsia           2.  Post-partum Htn, controlled on Procardia 30mg XL  Patient Active Problem List   Diagnosis    History of syncope    Intramural leiomyoma of uterus    Low ferritin    Gestational hypertension, third trimester    Short femur of fetus on prenatal ultrasound    Elevated blood pressure affecting pregnancy in third trimester, antepartum    39 5/7 weeks gestation of pregnancy    Primigravida in third trimester    Meconium in amniotic fluid     (normal spontaneous vaginal delivery)    Term birth of  male    Obstetric bilateral vaginal wall laceration without perineal laceration    Preeclampsia in postpartum period        Plan: 1.  Discharge to home           2.  Follow-up in office as directed for BP in 1-2 weeks

## 2024-05-15 NOTE — PROGRESS NOTES
Pt given discharge instructions, pt verbalizes understanding and pt left ambulatory with her  and infant

## 2024-05-15 NOTE — DISCHARGE SUMMARY
Obstetric Discharge Summary    Patient Name:  Lex Tellez    Medical Record Number:  [id number]    Attending:  Eris Juarez MD    Date of Admission:  2024    Date of Discharge:   5/15/24     Admitting Diagnosis  Post-partum preeclampsia  OB History          1    Para   1    Term   1       0    AB   0    Living   1         SAB   0    IAB   0    Ectopic   0    Molar   0    Multiple   0    Live Births   1                Reasons for Admission on 2024  9:30 PM  Preeclampsia in postpartum period [O14.95]        Procedures   Intravenous magnesium sulfate treatement                Discharge Condition  Stable    Discharge Meds:       Medication List        START taking these medications      NIFEdipine 30 MG extended release tablet  Commonly known as: ADALAT CC  Take 1 tablet by mouth daily  Start taking on: May 16, 2024            CONTINUE taking these medications      Aspirin Low Dose 81 MG chewable tablet  Generic drug: aspirin  TAKE 1 TABLET BY MOUTH EVERY DAY     ferrous sulfate 325 (65 Fe) MG tablet  Commonly known as: IRON 325  Take 1 tablet by mouth in the morning and at bedtime     ibuprofen 600 MG tablet  Commonly known as: ADVIL;MOTRIN  Take 1 tablet by mouth every 6 hours as needed for Pain     Kaitlib Fe 0.8-25 MG-MCG Chew  Generic drug: Norethin-Eth Estradiol-Fe     PRENATAL 1 PO               Where to Get Your Medications        You can get these medications from any pharmacy    Bring a paper prescription for each of these medications  NIFEdipine 30 MG extended release tablet         Discharge Information  Current Discharge Medication List        START taking these medications    Details   NIFEdipine (ADALAT CC) 30 MG extended release tablet Take 1 tablet by mouth daily  Qty: 30 tablet, Refills: 3           CONTINUE these medications which have NOT CHANGED    Details   ibuprofen (ADVIL;MOTRIN) 600 MG tablet Take 1 tablet by mouth every 6 hours as needed for Pain  Qty: 60 tablet,

## 2024-05-18 ASSESSMENT — LIFESTYLE VARIABLES: SMOKING_STATUS: 0

## 2024-05-18 NOTE — ANESTHESIA PRE PROCEDURE
Department of Anesthesiology  Preprocedure Note       Name:  Lex Tellez   Age:  29 y.o.  :  1995                                          MRN:  [id number]         Date:  2024      Surgeon: * No surgeons listed *    Procedure: * No procedures listed *    Medications prior to admission:   Prior to Admission medications    Medication Sig Start Date End Date Taking? Authorizing Provider   NIFEdipine (ADALAT CC) 30 MG extended release tablet Take 1 tablet by mouth daily 24   Eris Juarez MD   ibuprofen (ADVIL;MOTRIN) 600 MG tablet Take 1 tablet by mouth every 6 hours as needed for Pain 24   Aleshia Watkins DO   ASPIRIN LOW DOSE 81 MG chewable tablet TAKE 1 TABLET BY MOUTH EVERY DAY 24   Lulu Brandon MD   ferrous sulfate (IRON 325) 325 (65 Fe) MG tablet Take 1 tablet by mouth in the morning and at bedtime 3/19/24   Lulu Brandon MD   Prenatal MV-Min-Fe Fum-FA-DHA (PRENATAL 1 PO) Take by mouth    Woodrow Goncalves MD KAITLIB FE 0.8-25 MG-MCG CHEW Take 1 tablet by mouth nightly  18   Woodrow Goncalves MD       Current medications:    Current Outpatient Medications   Medication Sig Dispense Refill    NIFEdipine (ADALAT CC) 30 MG extended release tablet Take 1 tablet by mouth daily 30 tablet 3    ibuprofen (ADVIL;MOTRIN) 600 MG tablet Take 1 tablet by mouth every 6 hours as needed for Pain 60 tablet 1    ASPIRIN LOW DOSE 81 MG chewable tablet TAKE 1 TABLET BY MOUTH EVERY DAY 90 tablet 1    ferrous sulfate (IRON 325) 325 (65 Fe) MG tablet Take 1 tablet by mouth in the morning and at bedtime 60 tablet 2    Prenatal MV-Min-Fe Fum-FA-DHA (PRENATAL 1 PO) Take by mouth      KAITLIB FE 0.8-25 MG-MCG CHEW Take 1 tablet by mouth nightly   3     No current facility-administered medications for this visit.       Allergies:    Allergies   Allergen Reactions    Food Anaphylaxis and Swelling     Cilantro cherries  Can eat in small quantities and take benadryl after per pt.

## 2024-05-21 ENCOUNTER — POSTPARTUM VISIT (OUTPATIENT)
Dept: OBGYN | Age: 29
End: 2024-05-21

## 2024-05-21 VITALS
BODY MASS INDEX: 28.83 KG/M2 | DIASTOLIC BLOOD PRESSURE: 76 MMHG | WEIGHT: 189.6 LBS | HEART RATE: 90 BPM | SYSTOLIC BLOOD PRESSURE: 120 MMHG

## 2024-05-21 NOTE — PROGRESS NOTES
SUBJECTIVE: Lex Tellez   29 y.o. year old female   Here for bp check  Was seen last week wit pp htn, received mag and started on procardia  Bp good today  She has some sx of dizziness and muscle weakness  Discussed ppd-she does not want anything at this time  She will reach out then     OB History    Para Term  AB Living   1 1 1 0 0 1   SAB IAB Ectopic Molar Multiple Live Births   0 0 0 0 0 1      # Outcome Date GA Lbr Terry/2nd Weight Sex Delivery Anes PTL Lv   1 Term 24 39w5d 11:53 / 00:14 3.827 kg (8 lb 7 oz) M Vag-Spont EPI, Local N VELMA         OBJECTIVE:   Chief complaint:   Chief Complaint   Patient presents with    Postpartum Care     BP check          /76 (Position: Sitting)   Pulse 90   Wt 86 kg (189 lb 9.6 oz)   LMP 2023   Breastfeeding Yes   BMI 28.83 kg/m²   Past Medical History:   Diagnosis Date    Colitis 2021    enlarged colon     Migraine     Less frequent, only occasional, usually stress related    Migraine headache 10/31/2018    Preeclampsia in postpartum period 2024    Vasovagal syncopes      Past Surgical History:   Procedure Laterality Date    SINUS SURGERY      Polyps removed not certain site ie right, left or bilateral    TURBINATE RESECTION N/A 2021    CLOSED REDUCTION NASAL BONE FRACTURE, SUBMUCOUSAL RESECTION OF INFERIOR TURBINATES performed by Jacob Upton MD at [facility] [place] [facility] [place]    WISDOM TOOTH EXTRACTION       Current Outpatient Medications   Medication Sig Dispense Refill    NIFEdipine (ADALAT CC) 30 MG extended release tablet Take 1 tablet by mouth daily 30 tablet 3    ASPIRIN LOW DOSE 81 MG chewable tablet TAKE 1 TABLET BY MOUTH EVERY DAY 90 tablet 1    ferrous sulfate (IRON 325) 325 (65 Fe) MG tablet Take 1 tablet by mouth in the morning and at bedtime 60 tablet 2    Prenatal MV-Min-Fe Fum-FA-DHA (PRENATAL 1 PO) Take by mouth      ibuprofen (ADVIL;MOTRIN) 600 MG tablet Take 1 tablet by mouth every 6 hours as needed for Pain

## 2024-05-21 NOTE — PROGRESS NOTES
Patient here today for postpartum bp check.  Taking Nifedipine.  She is s/p  on 24.  Is breastfeeding without difficulty.  OB depression screening done with a score of 11.  New mom kit given to patient.  Discharge instructions have been discussed with the patient by BRANT Aguayo and she voiced understanding of plan of care.  Patient advised to call our office with any questions or concerns.

## 2024-05-31 ENCOUNTER — PATIENT MESSAGE (OUTPATIENT)
Dept: OBGYN | Age: 29
End: 2024-05-31

## 2024-06-20 ENCOUNTER — POSTPARTUM VISIT (OUTPATIENT)
Dept: OBGYN | Age: 29
End: 2024-06-20

## 2024-06-20 VITALS
WEIGHT: 181 LBS | HEART RATE: 82 BPM | SYSTOLIC BLOOD PRESSURE: 121 MMHG | DIASTOLIC BLOOD PRESSURE: 69 MMHG | HEIGHT: 68 IN | BODY MASS INDEX: 27.43 KG/M2

## 2024-06-20 PROCEDURE — 0503F POSTPARTUM CARE VISIT: CPT | Performed by: MIDWIFE

## 2024-06-20 NOTE — PROGRESS NOTES
Patient alert and pleasant with no concerns   Here today with  for post-partum exam  Depression screening obtained with a score of nine  Discharge instructions have been discussed with the patient. Patient advised to call our office with any questions or concerns.   Voiced understanding.

## 2024-06-20 NOTE — PROGRESS NOTES
Subjective:      Lex Tellez is a 29 y.o.  female,  presents with a complaint of Postpartum Care ( 24/BREAST FEEDING /Heart rate dropping to the 40s at night / IV site has a bump / Epidural site painful)        Current Complaints: Postpartum Visit  Patient here for postpartum visit. She is 6 weeks post partum following a spontaneous vaginal delivery. I have fully reviewed the prenatal and intrapartum course. The delivery was at 39w5d gestational weeks. Outcome: .  Anesthesia: Epidural.  Postpartum course has been complicated by elevated Bps, hospitalized Ppd #6,she was on medications and stopped taking 3 weeks ago, she was monitoring BP at home and WNL.  Baby's course has been doing well without problems. Baby is feeding breast.  Bleeding no bleeding.  Bowel function is normal. Bladder function is normal. Patient is not sexually active. . Postpartum depression screening: positive 9  Pt attributes to life situation of plans to be moving and now not moving.  Pt states she is in therapy and in family therapy. Pt feels she does not need medications at this time and if she does she has a psychiatrist   HR dropping into 40s while sleeping at night noted on apple watch       Gynecologic History  Patient's last menstrual period was 2023.  Contraception: none  Exercise: Yes  OB History          1    Para   1    Term   1       0    AB   0    Living   1         SAB   0    IAB   0    Ectopic   0    Molar   0    Multiple   0    Live Births   1              Patient's medications, allergies, past medical, surgical, social and family histories were reviewed and updated as appropriate.    Review of Systems  Constitutional: negative  Eyes: negative  Ears, nose, mouth, throat, and face: negative  Respiratory: negative  Cardiovascular: negative  Gastrointestinal: negative  Genitourinary:see above  Integument/breast: negative  Hematologic/lymphatic:

## 2025-01-23 ENCOUNTER — APPOINTMENT (OUTPATIENT)
Dept: CT IMAGING | Age: 30
End: 2025-01-23
Payer: COMMERCIAL

## 2025-01-23 ENCOUNTER — APPOINTMENT (OUTPATIENT)
Dept: ULTRASOUND IMAGING | Age: 30
End: 2025-01-23
Payer: COMMERCIAL

## 2025-01-23 ENCOUNTER — HOSPITAL ENCOUNTER (EMERGENCY)
Age: 30
Discharge: HOME | End: 2025-01-23
Payer: COMMERCIAL

## 2025-01-23 VITALS
HEART RATE: 78 BPM | WEIGHT: 175 LBS | TEMPERATURE: 97.9 F | BODY MASS INDEX: 26.52 KG/M2 | SYSTOLIC BLOOD PRESSURE: 111 MMHG | RESPIRATION RATE: 14 BRPM | HEIGHT: 68 IN | DIASTOLIC BLOOD PRESSURE: 77 MMHG | OXYGEN SATURATION: 100 %

## 2025-01-23 DIAGNOSIS — R93.89 ENDOMETRIAL THICKENING ON ULTRASOUND: ICD-10-CM

## 2025-01-23 DIAGNOSIS — K59.00 CONSTIPATION, UNSPECIFIED CONSTIPATION TYPE: ICD-10-CM

## 2025-01-23 DIAGNOSIS — R10.31 RIGHT LOWER QUADRANT ABDOMINAL PAIN: Primary | ICD-10-CM

## 2025-01-23 DIAGNOSIS — D25.9 UTERINE LEIOMYOMA, UNSPECIFIED LOCATION: ICD-10-CM

## 2025-01-23 LAB
ALBUMIN SERPL-MCNC: 4 G/DL (ref 3.5–5.2)
ALP SERPL-CCNC: 98 U/L (ref 35–104)
ALT SERPL-CCNC: 14 U/L (ref 0–32)
ANION GAP SERPL CALCULATED.3IONS-SCNC: 9 MMOL/L (ref 7–16)
AST SERPL-CCNC: 19 U/L (ref 0–31)
BASOPHILS # BLD: 0.1 K/UL (ref 0–0.2)
BASOPHILS NFR BLD: 1 % (ref 0–2)
BILIRUB SERPL-MCNC: 0.5 MG/DL (ref 0–1.2)
BILIRUB UR QL STRIP: NEGATIVE
BUN SERPL-MCNC: 14 MG/DL (ref 6–20)
CALCIUM SERPL-MCNC: 9.3 MG/DL (ref 8.6–10.2)
CHLORIDE SERPL-SCNC: 105 MMOL/L (ref 98–107)
CLARITY UR: CLEAR
CO2 SERPL-SCNC: 26 MMOL/L (ref 22–29)
COLOR UR: YELLOW
CREAT SERPL-MCNC: 0.8 MG/DL (ref 0.5–1)
EOSINOPHIL # BLD: 0.18 K/UL (ref 0.05–0.5)
EOSINOPHILS RELATIVE PERCENT: 2 % (ref 0–6)
EPI CELLS #/AREA URNS HPF: ABNORMAL /HPF
ERYTHROCYTE [DISTWIDTH] IN BLOOD BY AUTOMATED COUNT: 12.7 % (ref 11.5–15)
GFR, ESTIMATED: >90 ML/MIN/1.73M2
GLUCOSE SERPL-MCNC: 87 MG/DL (ref 74–99)
GLUCOSE UR STRIP-MCNC: NEGATIVE MG/DL
HCG, URINE, POC: NEGATIVE
HCT VFR BLD AUTO: 43 % (ref 34–48)
HGB BLD-MCNC: 13.3 G/DL (ref 11.5–15.5)
HGB UR QL STRIP.AUTO: NEGATIVE
IMM GRANULOCYTES # BLD AUTO: 0.03 K/UL (ref 0–0.58)
IMM GRANULOCYTES NFR BLD: 0 % (ref 0–5)
KETONES UR STRIP-MCNC: NEGATIVE MG/DL
LEUKOCYTE ESTERASE UR QL STRIP: NEGATIVE
LIPASE SERPL-CCNC: 25 U/L (ref 13–60)
LYMPHOCYTES NFR BLD: 1.61 K/UL (ref 1.5–4)
LYMPHOCYTES RELATIVE PERCENT: 17 % (ref 20–42)
Lab: NORMAL
MCH RBC QN AUTO: 28.6 PG (ref 26–35)
MCHC RBC AUTO-ENTMCNC: 30.9 G/DL (ref 32–34.5)
MCV RBC AUTO: 92.5 FL (ref 80–99.9)
MONOCYTES NFR BLD: 0.45 K/UL (ref 0.1–0.95)
MONOCYTES NFR BLD: 5 % (ref 2–12)
NEGATIVE QC PASS/FAIL: NORMAL
NEUTROPHILS NFR BLD: 75 % (ref 43–80)
NEUTS SEG NFR BLD: 7.28 K/UL (ref 1.8–7.3)
NITRITE UR QL STRIP: NEGATIVE
PH UR STRIP: 6 [PH] (ref 5–9)
PLATELET # BLD AUTO: 329 K/UL (ref 130–450)
PMV BLD AUTO: 9.8 FL (ref 7–12)
POSITIVE QC PASS/FAIL: NORMAL
POTASSIUM SERPL-SCNC: 4.2 MMOL/L (ref 3.5–5)
PROT SERPL-MCNC: 7.6 G/DL (ref 6.4–8.3)
PROT UR STRIP-MCNC: NEGATIVE MG/DL
RBC # BLD AUTO: 4.65 M/UL (ref 3.5–5.5)
RBC #/AREA URNS HPF: ABNORMAL /HPF
SODIUM SERPL-SCNC: 140 MMOL/L (ref 132–146)
SP GR UR STRIP: >1.03 (ref 1–1.03)
UROBILINOGEN UR STRIP-ACNC: 0.2 EU/DL (ref 0–1)
WBC #/AREA URNS HPF: ABNORMAL /HPF
WBC OTHER # BLD: 9.7 K/UL (ref 4.5–11.5)

## 2025-01-23 PROCEDURE — 83690 ASSAY OF LIPASE: CPT

## 2025-01-23 PROCEDURE — 80053 COMPREHEN METABOLIC PANEL: CPT

## 2025-01-23 PROCEDURE — 6360000004 HC RX CONTRAST MEDICATION: Performed by: RADIOLOGY

## 2025-01-23 PROCEDURE — 76830 TRANSVAGINAL US NON-OB: CPT

## 2025-01-23 PROCEDURE — 99285 EMERGENCY DEPT VISIT HI MDM: CPT

## 2025-01-23 PROCEDURE — 81001 URINALYSIS AUTO W/SCOPE: CPT

## 2025-01-23 PROCEDURE — 85025 COMPLETE CBC W/AUTO DIFF WBC: CPT

## 2025-01-23 PROCEDURE — 74177 CT ABD & PELVIS W/CONTRAST: CPT

## 2025-01-23 PROCEDURE — 87086 URINE CULTURE/COLONY COUNT: CPT

## 2025-01-23 RX ORDER — IOPAMIDOL 755 MG/ML
75 INJECTION, SOLUTION INTRAVASCULAR
Status: COMPLETED | OUTPATIENT
Start: 2025-01-23 | End: 2025-01-23

## 2025-01-23 RX ADMIN — IOPAMIDOL 75 ML: 755 INJECTION, SOLUTION INTRAVENOUS at 10:33

## 2025-01-23 ASSESSMENT — PAIN DESCRIPTION - DESCRIPTORS: DESCRIPTORS: BURNING;SHARP;STABBING

## 2025-01-23 ASSESSMENT — PAIN - FUNCTIONAL ASSESSMENT
PAIN_FUNCTIONAL_ASSESSMENT: 0-10
PAIN_FUNCTIONAL_ASSESSMENT: PREVENTS OR INTERFERES SOME ACTIVE ACTIVITIES AND ADLS

## 2025-01-23 ASSESSMENT — LIFESTYLE VARIABLES
HOW OFTEN DO YOU HAVE A DRINK CONTAINING ALCOHOL: NEVER
HOW MANY STANDARD DRINKS CONTAINING ALCOHOL DO YOU HAVE ON A TYPICAL DAY: PATIENT DOES NOT DRINK

## 2025-01-23 ASSESSMENT — PAIN DESCRIPTION - FREQUENCY: FREQUENCY: INTERMITTENT

## 2025-01-23 ASSESSMENT — PAIN DESCRIPTION - ORIENTATION: ORIENTATION: RIGHT;LOWER

## 2025-01-23 ASSESSMENT — PAIN DESCRIPTION - LOCATION: LOCATION: ABDOMEN

## 2025-01-23 ASSESSMENT — PAIN DESCRIPTION - PAIN TYPE: TYPE: ACUTE PAIN

## 2025-01-23 ASSESSMENT — PAIN SCALES - GENERAL: PAINLEVEL_OUTOF10: 6

## 2025-01-23 NOTE — ED NOTES
Patient educated on need for urine specimen.  States she is unable to provide specimen at this time, specimen cup supplied to patient.

## 2025-01-23 NOTE — ED PROVIDER NOTES
Independent [facility] Visit.           Department of Emergency Medicine   ED  Provider Note  Admit Date/RoomTime: 1/23/2025  8:38 AM  ED Room: 05/05    Chief Complaint       Dizziness (Comes and goes. Like waves, unsteady. ) and Abdominal Pain (Right lower abd pain, woke patient up at 7am. )    History of Present Illness      Lex Tellez is a 29 y.o. old female who presents to the ED with right lower quadrant abdominal pain that began when she woke up this morning around 7 AM.  She describes the pain as burning.  She states it is worse when she lays flat or stretches.  She denies vomiting or diarrhea.  She has no rectal bleeding, dark/tarry stools, or abnormal vaginal bleeding.  Patient states she did have some nausea.  She also reports dizziness that occurs when the pain gets bad.  She states the dizziness \"comes and goes like waves.\" She states she feels unsteady but denies the room is spinning.  She has no chest pain, shortness of breath, or pain with breathing.  Patient is not currently feeling dizzy at this exam.  Patient denies any loss of consciousness.  She does have a history of vasovagal syncope and states this dizzy feeling feels similar to when she gets her blood drawn.  Patient is alert and oriented x 3 and in no apparent distress at this exam.  She is nontoxic-appearing.  She denies any past abdominal surgeries.  Patient is politely declined any pain medication at this time    PCP: No primary care provider on file.    NIH 0    ROS   Pertinent positives and negatives are stated within HPI, all other systems reviewed and are negative.    Past Medical History:  has a past medical history of Colitis, Migraine, Migraine headache, Preeclampsia in postpartum period, and Vasovagal syncopes.    Past Surgical History:  has a past surgical history that includes [place] tooth extraction; sinus surgery (2007); and turbinate resection (N/A, 04/07/2021).    Social History:  reports that she has never smoked. She has

## 2025-01-24 LAB
MICROORGANISM SPEC CULT: ABNORMAL
SERVICE CMNT-IMP: ABNORMAL
SPECIMEN DESCRIPTION: ABNORMAL

## 2025-01-30 ENCOUNTER — TELEPHONE (OUTPATIENT)
Dept: ENT CLINIC | Age: 30
End: 2025-01-30

## 2025-01-30 NOTE — TELEPHONE ENCOUNTER
Patient called stating she was seen three years ago, she believes she broke her nose and would like to be seen.  Will need a return call to let her know she needs a referral.

## 2025-02-03 ENCOUNTER — HOSPITAL ENCOUNTER (OUTPATIENT)
Dept: CT IMAGING | Age: 30
Discharge: HOME | End: 2025-02-05
Payer: COMMERCIAL

## 2025-02-03 ENCOUNTER — OFFICE VISIT (OUTPATIENT)
Dept: FAMILY MEDICINE CLINIC | Age: 30
End: 2025-02-03
Payer: COMMERCIAL

## 2025-02-03 VITALS
WEIGHT: 180 LBS | HEIGHT: 68 IN | DIASTOLIC BLOOD PRESSURE: 74 MMHG | SYSTOLIC BLOOD PRESSURE: 116 MMHG | OXYGEN SATURATION: 98 % | BODY MASS INDEX: 27.28 KG/M2 | TEMPERATURE: 97.8 F | RESPIRATION RATE: 20 BRPM | HEART RATE: 93 BPM

## 2025-02-03 DIAGNOSIS — S02.2XXA CLOSED FRACTURE OF NASAL BONE, INITIAL ENCOUNTER: ICD-10-CM

## 2025-02-03 DIAGNOSIS — S02.2XXA CLOSED FRACTURE OF NASAL BONE, INITIAL ENCOUNTER: Primary | ICD-10-CM

## 2025-02-03 PROCEDURE — 70486 CT MAXILLOFACIAL W/O DYE: CPT

## 2025-02-03 PROCEDURE — 99213 OFFICE O/P EST LOW 20 MIN: CPT

## 2025-02-03 SDOH — HEALTH STABILITY: PHYSICAL HEALTH: ON AVERAGE, HOW MANY DAYS PER WEEK DO YOU ENGAGE IN MODERATE TO STRENUOUS EXERCISE (LIKE A BRISK WALK)?: 2 DAYS

## 2025-02-03 SDOH — HEALTH STABILITY: PHYSICAL HEALTH: ON AVERAGE, HOW MANY MINUTES DO YOU ENGAGE IN EXERCISE AT THIS LEVEL?: 40 MIN

## 2025-02-03 ASSESSMENT — PATIENT HEALTH QUESTIONNAIRE - PHQ9
SUM OF ALL RESPONSES TO PHQ QUESTIONS 1-9: 0
2. FEELING DOWN, DEPRESSED OR HOPELESS: NOT AT ALL
1. LITTLE INTEREST OR PLEASURE IN DOING THINGS: NOT AT ALL
SUM OF ALL RESPONSES TO PHQ9 QUESTIONS 1 & 2: 0
SUM OF ALL RESPONSES TO PHQ QUESTIONS 1-9: 0

## 2025-02-03 NOTE — PROGRESS NOTES
S: 30 y.o. female with   Chief Complaint   Patient presents with    Establish Care    Nasal Injury     Concerned about possible fracture  Injury 6 days ago, requesting ENT referral        Pt was in the ED for nose trauma.  Pt has seen ENT in the past and needs a referral.  Pt was also seen in the ED for dizziness and found to have a fibroid. She was seen by GYN and they are following up with this.    O: VS:  height is 1.727 m (5' 8\") and weight is 81.6 kg (180 lb). Her temperature is 97.8 °F (36.6 °C). Her blood pressure is 116/74 and her pulse is 93. Her respiration is 20 and oxygen saturation is 98%.   BP Readings from Last 3 Encounters:   02/03/25 116/74   01/23/25 111/77   06/20/24 121/69     See resident note    Impression/Plan:   1) nose trauma - urgent referral to ENT.  CT of the facial bone ordered.   2) prev - sign release for pap. RTC for well adult visit.      Health Maintenance Due   Topic Date Due    Hepatitis A vaccine (2 of 2 - 2-dose series) 12/28/2007    Varicella vaccine (1 of 2 - 13+ 2-dose series) Never done    Hepatitis B vaccine (1 of 3 - 19+ 3-dose series) Never done    Flu vaccine (1) Never done    COVID-19 Vaccine (1 - 2023-24 season) Never done    Depression Screen  01/17/2025    Cervical cancer screen  01/24/2025         Attending Physician Statement  I have discussed the case, including pertinent history and exam findings with the resident.  I agree with the documented assessment and plan.      Peggy Grey MD

## 2025-02-03 NOTE — PROGRESS NOTES
[facility]  Family Medicine Residency  Phone: [phone]  Fax: [phone]    Patient:  Lex Tellez 30 y.o. female                                 Date of Service: 2/3/25                            Chiefcomplaint:   Chief Complaint   Patient presents with    Establish Care    Nasal Injury     Concerned about possible fracture  Injury 6 days ago, requesting ENT referral            History of Present Illness:     The patient is a 30 y.o. female  presented to the clinic with complaints as above.    Last Friday,broke her nose while playing with baby  4 years ago had a broken nose by a dog  Difficulty breathing and partially blocked on left nostril  No leaking from nose  No bleeding from nose  Frontal headaches,not fully resolved  ENT appointment has been requested.  Dizziness and abdominal pain that made her go to the ED on 23rd January.  It was associated with some nausea.  CT scan had shown constipation with no signs of appendicitis.  Ultrasound revealed uterine fibroid and thickened endometrium.  [facility].followed up with obgyn and was suggested it was a cyst that broke and caused pain.followup 6-12 weeks later for cyst follow up  No further dizziness episodes  Labs reviewed and unremarkable.  EKG on 1/22-sinus tachycardia  Para 1 living 1 4 delivered in May 2024 and was given nifedipine 30 Mg at discharge.  This was in view of preeclampsia post partum.  It was stopped 1 month postpartum in June 2024.  Phq-2 zero  Denies any fever, chills, n/v, dizziness, vision changes, neck tenderness or stiffness, weakness, cp, palpitations, leg swelling/tenderness, sob, cough, abd pain, dysuria, hematuria, diarrhea, constipation, bloody stools.        Review of Systems:   Review of Systems    Past Medical History:      Diagnosis Date    ADHD (attention deficit hyperactivity disorder)     Anxiety     Colitis 04/01/2021    enlarged colon     Migraine 2017    Less frequent,

## 2025-02-04 NOTE — RESULT ENCOUNTER NOTE
Kindly inform the patient that there is no signs of nasal fracture send normal CT of the facial bones.

## 2025-02-05 ENCOUNTER — TELEPHONE (OUTPATIENT)
Dept: ENT CLINIC | Age: 30
End: 2025-02-05

## 2025-03-05 ENCOUNTER — OFFICE VISIT (OUTPATIENT)
Dept: ENT CLINIC | Age: 30
End: 2025-03-05
Payer: COMMERCIAL

## 2025-03-05 VITALS
BODY MASS INDEX: 27.13 KG/M2 | OXYGEN SATURATION: 98 % | TEMPERATURE: 97.6 F | WEIGHT: 179 LBS | SYSTOLIC BLOOD PRESSURE: 121 MMHG | DIASTOLIC BLOOD PRESSURE: 82 MMHG | RESPIRATION RATE: 17 BRPM | HEIGHT: 68 IN | HEART RATE: 73 BPM

## 2025-03-05 DIAGNOSIS — R09.81 NASAL CONGESTION: ICD-10-CM

## 2025-03-05 DIAGNOSIS — S02.2XXS CLOSED FRACTURE OF NASAL BONE, SEQUELA: Primary | ICD-10-CM

## 2025-03-05 PROCEDURE — 99213 OFFICE O/P EST LOW 20 MIN: CPT | Performed by: OTOLARYNGOLOGY

## 2025-03-05 ASSESSMENT — ENCOUNTER SYMPTOMS
SORE THROAT: 0
RESPIRATORY NEGATIVE: 1
ALLERGIC/IMMUNOLOGIC NEGATIVE: 1
RHINORRHEA: 0

## 2025-03-05 NOTE — PROGRESS NOTES
Department of Otolaryngology  Office Consult Note  3/5/25          Subjective:        Chief Complaint:  had concerns including New Patient (NP closed nasal bone fx patient states that she was head butted by her baby patient states that left nostril feels congested ).     Patient ID: Lex Tellez is a 30 y.o. female.    HPI: Patient presents as  established patient for nasal bone fracture.  Patient states about 1 month ago she was hit in the nose by her child and dog at home.  Since then she has noted some nasal congestion and difficulty breathing out of the left side of the nose greater than right.  She also notices a deformity to bilateral nasal bones.  She does have a history of nasal bone fracture underwent closed reduction of nasal bones in 2021.  She is currently breast-feeding.  She has been using nasal saline spray.    Review of Systems   Constitutional: Negative.    HENT:  Positive for congestion. Negative for ear discharge, ear pain, hearing loss, rhinorrhea, sneezing and sore throat.    Respiratory: Negative.     Cardiovascular:  Negative for chest pain.   Musculoskeletal: Negative.    Skin: Negative.    Allergic/Immunologic: Negative.    Neurological: Negative.    Psychiatric/Behavioral: Negative.     All other systems reviewed and are negative.        Past Medical History:   Diagnosis Date    ADHD (attention deficit hyperactivity disorder)     Anxiety     Colitis 04/01/2021    enlarged colon     Migraine 2017    Less frequent, only occasional, usually stress related    Migraine headache 10/31/2018    Preeclampsia in postpartum period 05/13/2024    Vasovagal syncopes      Past Surgical History:   Procedure Laterality Date    COLONOSCOPY      SINUS SURGERY  2007    Polyps removed not certain site ie right, left or bilateral    TURBINATE RESECTION N/A 04/07/2021    CLOSED REDUCTION NASAL BONE FRACTURE, SUBMUCOUSAL RESECTION OF INFERIOR TURBINATES performed by Jacob Upton MD at [facility] [place]    [facility]

## 2025-06-18 ENCOUNTER — HOSPITAL ENCOUNTER (OUTPATIENT)
Dept: HOSPITAL 83 - US | Age: 30
Discharge: HOME | End: 2025-06-18
Attending: NURSE PRACTITIONER
Payer: COMMERCIAL

## 2025-06-18 DIAGNOSIS — D25.0: Primary | ICD-10-CM

## 2025-06-18 DIAGNOSIS — N83.209: ICD-10-CM

## (undated) DEVICE — SPLINT NSL L3XW3IN IVRY THERMOPLASTIC STBL

## (undated) DEVICE — SET INSTR NASAL HOUSE

## (undated) DEVICE — COUNTER NDL 30 COUNT DBL MAG

## (undated) DEVICE — MASTISOL ADHESIVE LIQ 2/3ML

## (undated) DEVICE — RETAINER 16IN ADJ EAR LOOP DRSG NSL NS

## (undated) DEVICE — DOUBLE BASIN SET: Brand: MEDLINE INDUSTRIES, INC.

## (undated) DEVICE — STRIP,CLOSURE,WOUND,MEDI-STRIP,1/2X4: Brand: MEDLINE

## (undated) DEVICE — SYRINGE MED 10ML TRNSLUC BRL PLUNG BLK MRK POLYPR CTRL

## (undated) DEVICE — AGENT HEMSTAT W2XL3IN OXIDIZED REGENERATED CELOS ABSRB

## (undated) DEVICE — SINU FOAM: Brand: SINU-FOAM

## (undated) DEVICE — MARKER,SKIN,WI/RULER AND LABELS: Brand: MEDLINE

## (undated) DEVICE — SYRINGE MED 10ML SLIP TIP BLNT FILL AND LUERLOCK DISP

## (undated) DEVICE — ELECTRODE NDL L2.8IN COAT LO PWR SET EDGE

## (undated) DEVICE — STANDARD HYPODERMIC NEEDLE,ALUMINUM HUB: Brand: MONOJECT

## (undated) DEVICE — SOFT-FORM™ THERMO PLASTIC EXTERNAL NASAL SPLINTS, REGULAR: Brand: SOFT-FORM™ THERMO PLASTIC EXTERNAL NASAL SPLINTS

## (undated) DEVICE — SPLINT 1524055 DOYLE II AIRWAY SET: Brand: DOYLE II ™

## (undated) DEVICE — NEEDLE SPNL 25GA L3.5IN BLU HUB S STL REG WALL FIT STYL W/

## (undated) DEVICE — CODMAN® SURGICAL PATTIES 1/2" X 3" (1.27CM X 7.62CM): Brand: CODMAN®

## (undated) DEVICE — REFLEX ULTRA 45 WITH INTEGRATED CABLE: Brand: COBLATION

## (undated) DEVICE — TOWEL,OR,DSP,ST,BLUE,STD,6/PK,12PK/CS: Brand: MEDLINE

## (undated) DEVICE — INTENDED FOR TISSUE SEPARATION, AND OTHER PROCEDURES THAT REQUIRE A SHARP SURGICAL BLADE TO PUNCTURE OR CUT.: Brand: BARD-PARKER ® STAINLESS STEEL BLADES

## (undated) DEVICE — SURGICAL PROCEDURE PACK EENT CUST

## (undated) DEVICE — GLOVE ORANGE PI 7 1/2   MSG9075